# Patient Record
Sex: FEMALE | Race: WHITE | URBAN - METROPOLITAN AREA
[De-identification: names, ages, dates, MRNs, and addresses within clinical notes are randomized per-mention and may not be internally consistent; named-entity substitution may affect disease eponyms.]

---

## 2023-06-19 ENCOUNTER — OFFICE VISIT (OUTPATIENT)
Dept: ORTHOPEDICS CLINIC | Facility: CLINIC | Age: 68
End: 2023-06-19
Payer: MEDICARE

## 2023-06-19 ENCOUNTER — OFFICE VISIT (OUTPATIENT)
Dept: SURGERY | Facility: CLINIC | Age: 68
End: 2023-06-19
Payer: MEDICARE

## 2023-06-19 ENCOUNTER — HOSPITAL ENCOUNTER (OUTPATIENT)
Dept: GENERAL RADIOLOGY | Age: 68
Discharge: HOME | End: 2023-06-19
Attending: STUDENT IN AN ORGANIZED HEALTH CARE EDUCATION/TRAINING PROGRAM
Payer: MEDICARE

## 2023-06-19 VITALS
DIASTOLIC BLOOD PRESSURE: 80 MMHG | BODY MASS INDEX: 22.08 KG/M2 | HEART RATE: 70 BPM | WEIGHT: 120 LBS | SYSTOLIC BLOOD PRESSURE: 150 MMHG | HEIGHT: 62 IN

## 2023-06-19 DIAGNOSIS — M54.2 NECK PAIN: ICD-10-CM

## 2023-06-19 DIAGNOSIS — M54.12 CERVICAL RADICULOPATHY: Primary | ICD-10-CM

## 2023-06-19 DIAGNOSIS — M54.2 NECK PAIN: Primary | ICD-10-CM

## 2023-06-19 PROBLEM — N64.9 BREAST DISORDER: Status: ACTIVE | Noted: 2023-06-19

## 2023-06-19 PROBLEM — S16.1XXA CERVICAL STRAIN: Status: ACTIVE | Noted: 2021-06-10

## 2023-06-19 PROBLEM — I10 PRIMARY HYPERTENSION: Status: ACTIVE | Noted: 2023-02-24

## 2023-06-19 PROBLEM — E78.2 MIXED HYPERLIPIDEMIA: Status: ACTIVE | Noted: 2023-02-24

## 2023-06-19 PROBLEM — R00.2 PALPITATIONS: Status: ACTIVE | Noted: 2023-02-24

## 2023-06-19 PROBLEM — J30.9 ALLERGIC RHINITIS: Status: ACTIVE | Noted: 2021-06-10

## 2023-06-19 PROBLEM — J06.9 VIRAL UPPER RESPIRATORY TRACT INFECTION: Status: ACTIVE | Noted: 2021-06-10

## 2023-06-19 PROCEDURE — 3077F SYST BP >= 140 MM HG: CPT | Performed by: NEUROLOGICAL SURGERY

## 2023-06-19 PROCEDURE — 3079F DIAST BP 80-89 MM HG: CPT | Performed by: NEUROLOGICAL SURGERY

## 2023-06-19 PROCEDURE — 3008F BODY MASS INDEX DOCD: CPT | Performed by: NEUROLOGICAL SURGERY

## 2023-06-19 PROCEDURE — 1159F MED LIST DOCD IN RCRD: CPT | Performed by: NEUROLOGICAL SURGERY

## 2023-06-19 PROCEDURE — 99204 OFFICE O/P NEW MOD 45 MIN: CPT | Performed by: NEUROLOGICAL SURGERY

## 2023-06-19 PROCEDURE — 1160F RVW MEDS BY RX/DR IN RCRD: CPT | Performed by: NEUROLOGICAL SURGERY

## 2023-06-19 PROCEDURE — 72050 X-RAY EXAM NECK SPINE 4/5VWS: CPT | Performed by: STUDENT IN AN ORGANIZED HEALTH CARE EDUCATION/TRAINING PROGRAM

## 2023-06-19 RX ORDER — OMEGA-3 FATTY ACIDS/FISH OIL 300-1000MG
CAPSULE ORAL AS NEEDED
COMMUNITY

## 2023-06-19 RX ORDER — PREDNISONE 50 MG/1
50 TABLET ORAL DAILY
COMMUNITY
Start: 2023-04-22 | End: 2023-06-19

## 2023-06-19 RX ORDER — VALSARTAN 160 MG/1
1 TABLET ORAL AS DIRECTED
COMMUNITY
Start: 2021-05-03

## 2023-06-19 RX ORDER — MELOXICAM 7.5 MG/1
7.5 TABLET ORAL DAILY
Qty: 30 TABLET | Refills: 0 | Status: SHIPPED | OUTPATIENT
Start: 2023-06-19

## 2023-06-19 RX ORDER — CETIRIZINE HYDROCHLORIDE 10 MG/1
10 TABLET ORAL NIGHTLY
COMMUNITY
End: 2023-06-19

## 2023-06-19 RX ORDER — ALENDRONATE SODIUM 35 MG/1
35 TABLET ORAL
Qty: 4 TABLET | Refills: 11 | COMMUNITY
Start: 2022-09-16 | End: 2023-09-16

## 2023-06-19 RX ORDER — METOPROLOL SUCCINATE 25 MG/1
1 TABLET, EXTENDED RELEASE ORAL DAILY
Qty: 30 TABLET | Refills: 11 | COMMUNITY
Start: 2022-11-14 | End: 2023-11-14

## 2023-06-19 RX ORDER — ALPRAZOLAM 0.25 MG/1
1 TABLET ORAL AS DIRECTED
COMMUNITY
Start: 2023-03-16

## 2023-06-19 RX ORDER — METHYLPREDNISOLONE 4 MG/1
TABLET ORAL
Qty: 21 TABLET | Refills: 0 | Status: SHIPPED | OUTPATIENT
Start: 2023-06-19

## 2023-06-19 RX ORDER — ASPIRIN 325 MG
TABLET ORAL AS DIRECTED
COMMUNITY
End: 2023-06-19

## 2023-06-19 NOTE — PROGRESS NOTES
New patient:  Reason for visit: neck  back pain       Estimated time of onset:  month(s)    Numeric Rating Scale: (No Pain) 0  to  10 (Worst Pain)        Pain at Present:  3/10                                                                                                                       Distribution of Pain:    bilateral    Past Treatments for Current Pain Condition:   Physical Therapy and NSAIDS  Response to treatment: no relief    Prior diagnostic testing related to this condition:

## 2023-07-10 ENCOUNTER — TELEPHONE (OUTPATIENT)
Dept: SURGERY | Facility: CLINIC | Age: 68
End: 2023-07-10

## 2023-07-10 NOTE — TELEPHONE ENCOUNTER
Pt called along with an [employer] rep. Pt has an appt for MRI Cervical scheduled for 7/17, but there is no prior authorization. Please advise, Pt's best call back number is [phone]. Endorsed to Prior Auth.

## 2023-07-17 ENCOUNTER — HOSPITAL ENCOUNTER (OUTPATIENT)
Dept: MRI IMAGING | Facility: HOSPITAL | Age: 68
Discharge: HOME | End: 2023-07-17
Attending: NEUROLOGICAL SURGERY
Payer: MEDICARE

## 2023-07-17 DIAGNOSIS — M54.12 CERVICAL RADICULOPATHY: ICD-10-CM

## 2023-07-17 PROCEDURE — 72141 MRI NECK SPINE W/O DYE: CPT | Performed by: NEUROLOGICAL SURGERY

## 2023-09-11 ENCOUNTER — TELEPHONE (OUTPATIENT)
Dept: SURGERY | Facility: CLINIC | Age: 68
End: 2023-09-11

## 2023-09-11 NOTE — TELEPHONE ENCOUNTER
Call transferred to Nursing. Evelyn James is inquiring if the physician would prefer for the patient to undergo a Bone Spec/ CT  instead of the NM Bone scan 3 phase. Evelyn James states the Bone spec/CT is normally better for reviewing bone lesions. Spoke with Dr. Andrew Molina in office regarding inquiry listed above. Dr. Andrew Molina agreed to change imaging per [facility] recommendations. Returned call to Evelyn James advised of the providers agreement to  recommendations. Evelyn James acknowledged and was appreciative. Call was ended.

## 2023-09-12 ENCOUNTER — HOSPITAL ENCOUNTER (OUTPATIENT)
Dept: NUCLEAR MEDICINE | Facility: HOSPITAL | Age: 68
Discharge: HOME | End: 2023-09-12
Attending: NEUROLOGICAL SURGERY
Payer: MEDICARE

## 2023-09-12 ENCOUNTER — HOSPITAL ENCOUNTER (OUTPATIENT)
Dept: MRI IMAGING | Facility: HOSPITAL | Age: 68
Discharge: HOME | End: 2023-09-12
Attending: NEUROLOGICAL SURGERY
Payer: MEDICARE

## 2023-09-12 DIAGNOSIS — M89.9 BONE LESION: ICD-10-CM

## 2023-09-12 DIAGNOSIS — M54.12 CERVICAL RADICULOPATHY: ICD-10-CM

## 2023-09-12 PROCEDURE — 72156 MRI NECK SPINE W/O & W/DYE: CPT | Performed by: NEUROLOGICAL SURGERY

## 2023-09-12 PROCEDURE — 78306 BONE IMAGING WHOLE BODY: CPT | Performed by: NEUROLOGICAL SURGERY

## 2023-09-12 PROCEDURE — 78832 RP LOCLZJ TUM SPECT W/CT 2: CPT | Performed by: NEUROLOGICAL SURGERY

## 2023-09-12 PROCEDURE — A9575 INJ GADOTERATE MEGLUMI 0.1ML: HCPCS | Performed by: NEUROLOGICAL SURGERY

## 2023-09-12 RX ORDER — GADOTERATE MEGLUMINE 376.9 MG/ML
15 INJECTION INTRAVENOUS
Status: COMPLETED | OUTPATIENT
Start: 2023-09-12 | End: 2023-09-12

## 2023-09-12 RX ADMIN — GADOTERATE MEGLUMINE 11 ML: 376.9 INJECTION INTRAVENOUS at 16:03:00

## 2023-09-13 ENCOUNTER — TELEPHONE (OUTPATIENT)
Dept: HEMATOLOGY/ONCOLOGY | Facility: HOSPITAL | Age: 68
End: 2023-09-13

## 2023-09-13 ENCOUNTER — OFFICE VISIT (OUTPATIENT)
Dept: HEMATOLOGY/ONCOLOGY | Facility: HOSPITAL | Age: 68
End: 2023-09-13
Attending: INTERNAL MEDICINE
Payer: MEDICARE

## 2023-09-13 VITALS
HEART RATE: 64 BPM | RESPIRATION RATE: 16 BRPM | HEIGHT: 62.01 IN | BODY MASS INDEX: 21.26 KG/M2 | SYSTOLIC BLOOD PRESSURE: 175 MMHG | DIASTOLIC BLOOD PRESSURE: 84 MMHG | TEMPERATURE: 99 F | OXYGEN SATURATION: 97 % | WEIGHT: 117 LBS

## 2023-09-13 DIAGNOSIS — M54.6 ACUTE MIDLINE THORACIC BACK PAIN: ICD-10-CM

## 2023-09-13 DIAGNOSIS — M89.9 LYTIC LESION OF BONE ON X-RAY: Primary | ICD-10-CM

## 2023-09-13 DIAGNOSIS — R94.8 ABNORMAL BONE SCAN OF THORACIC SPINE: ICD-10-CM

## 2023-09-13 LAB
ALBUMIN SERPL-MCNC: 4 G/DL (ref 3.4–5)
ALBUMIN/GLOB SERPL: 1 {RATIO} (ref 1–2)
ALP LIVER SERPL-CCNC: 270 U/L
ALT SERPL-CCNC: 65 U/L
ANION GAP SERPL CALC-SCNC: 6 MMOL/L (ref 0–18)
APTT PPP: 28.7 SECONDS (ref 23.3–35.6)
AST SERPL-CCNC: 37 U/L (ref 15–37)
BASOPHILS # BLD AUTO: 0.03 X10(3) UL (ref 0–0.2)
BASOPHILS NFR BLD AUTO: 0.5 %
BILIRUB SERPL-MCNC: 0.8 MG/DL (ref 0.1–2)
BRCA1 C.185DELAG BLD/T QL: 94.3 U/ML (ref ?–38)
BUN BLD-MCNC: 17 MG/DL (ref 7–18)
CALCIUM BLD-MCNC: 9.7 MG/DL (ref 8.5–10.1)
CANCER AG15-3 SERPL-ACNC: 75.1 U/ML (ref ?–35)
CHLORIDE SERPL-SCNC: 106 MMOL/L (ref 98–112)
CO2 SERPL-SCNC: 27 MMOL/L (ref 21–32)
CREAT BLD-MCNC: 0.8 MG/DL
EGFRCR SERPLBLD CKD-EPI 2021: 80 ML/MIN/1.73M2 (ref 60–?)
EOSINOPHIL # BLD AUTO: 0.11 X10(3) UL (ref 0–0.7)
EOSINOPHIL NFR BLD AUTO: 1.9 %
ERYTHROCYTE [DISTWIDTH] IN BLOOD BY AUTOMATED COUNT: 13.2 %
FASTING STATUS PATIENT QL REPORTED: NO
GLOBULIN PLAS-MCNC: 3.9 G/DL (ref 2.8–4.4)
GLUCOSE BLD-MCNC: 94 MG/DL (ref 70–99)
HCT VFR BLD AUTO: 43.9 %
HGB BLD-MCNC: 14.7 G/DL
IMM GRANULOCYTES # BLD AUTO: 0.02 X10(3) UL (ref 0–1)
IMM GRANULOCYTES NFR BLD: 0.3 %
INR BLD: 0.98 (ref 0.85–1.16)
LDH SERPL L TO P-CCNC: 200 U/L
LYMPHOCYTES # BLD AUTO: 1.3 X10(3) UL (ref 1–4)
LYMPHOCYTES NFR BLD AUTO: 22.5 %
MCH RBC QN AUTO: 30.3 PG (ref 26–34)
MCHC RBC AUTO-ENTMCNC: 33.5 G/DL (ref 31–37)
MCV RBC AUTO: 90.5 FL
MONOCYTES # BLD AUTO: 0.6 X10(3) UL (ref 0.1–1)
MONOCYTES NFR BLD AUTO: 10.4 %
NEUTROPHILS # BLD AUTO: 3.73 X10 (3) UL (ref 1.5–7.7)
NEUTROPHILS # BLD AUTO: 3.73 X10(3) UL (ref 1.5–7.7)
NEUTROPHILS NFR BLD AUTO: 64.4 %
OSMOLALITY SERPL CALC.SUM OF ELEC: 289 MOSM/KG (ref 275–295)
PLATELET # BLD AUTO: 229 10(3)UL (ref 150–450)
POTASSIUM SERPL-SCNC: 4 MMOL/L (ref 3.5–5.1)
PROT SERPL-MCNC: 7.9 G/DL (ref 6.4–8.2)
PROTHROMBIN TIME: 13 SECONDS (ref 11.6–14.8)
RBC # BLD AUTO: 4.85 X10(6)UL
SODIUM SERPL-SCNC: 139 MMOL/L (ref 136–145)
WBC # BLD AUTO: 5.8 X10(3) UL (ref 4–11)

## 2023-09-13 PROCEDURE — G2212 PROLONG OUTPT/OFFICE VIS: HCPCS | Performed by: INTERNAL MEDICINE

## 2023-09-13 PROCEDURE — 99205 OFFICE O/P NEW HI 60 MIN: CPT | Performed by: INTERNAL MEDICINE

## 2023-09-13 RX ORDER — LETROZOLE 2.5 MG/1
2.5 TABLET, FILM COATED ORAL DAILY
Qty: 30 TABLET | Refills: 3 | Status: SHIPPED | OUTPATIENT
Start: 2023-09-13

## 2023-09-13 RX ORDER — DEXAMETHASONE 4 MG/1
8 TABLET ORAL
Qty: 60 TABLET | Refills: 0 | Status: SHIPPED | OUTPATIENT
Start: 2023-09-13

## 2023-09-14 ENCOUNTER — PATIENT MESSAGE (OUTPATIENT)
Dept: HEMATOLOGY/ONCOLOGY | Facility: HOSPITAL | Age: 68
End: 2023-09-14

## 2023-09-15 LAB
ALBUMIN SERPL ELPH-MCNC: 4.45 G/DL (ref 3.75–5.21)
ALBUMIN/GLOB SERPL: 1.51 {RATIO} (ref 1–2)
ALPHA1 GLOB SERPL ELPH-MCNC: 0.42 G/DL (ref 0.19–0.46)
ALPHA2 GLOB SERPL ELPH-MCNC: 0.88 G/DL (ref 0.48–1.05)
B-GLOBULIN SERPL ELPH-MCNC: 0.75 G/DL (ref 0.68–1.23)
GAMMA GLOB SERPL ELPH-MCNC: 0.89 G/DL (ref 0.62–1.7)
KAPPA LC FREE SER-MCNC: 2.42 MG/DL (ref 0.33–1.94)
KAPPA LC FREE/LAMBDA FREE SER NEPH: 1.79 {RATIO} (ref 0.26–1.65)
LAMBDA LC FREE SERPL-MCNC: 1.35 MG/DL (ref 0.57–2.63)
PROT SERPL-MCNC: 7.4 G/DL (ref 6.4–8.2)

## 2023-09-20 ENCOUNTER — HOSPITAL ENCOUNTER (OUTPATIENT)
Dept: NUCLEAR MEDICINE | Facility: HOSPITAL | Age: 68
Discharge: HOME | End: 2023-09-20
Attending: INTERNAL MEDICINE
Payer: MEDICARE

## 2023-09-20 ENCOUNTER — HOSPITAL ENCOUNTER (OUTPATIENT)
Dept: MRI IMAGING | Facility: HOSPITAL | Age: 68
Discharge: HOME | End: 2023-09-20
Attending: INTERNAL MEDICINE
Payer: MEDICARE

## 2023-09-20 ENCOUNTER — TELEPHONE (OUTPATIENT)
Dept: HEMATOLOGY/ONCOLOGY | Facility: HOSPITAL | Age: 68
End: 2023-09-20

## 2023-09-20 DIAGNOSIS — R94.8 ABNORMAL BONE SCAN OF THORACIC SPINE: ICD-10-CM

## 2023-09-20 DIAGNOSIS — M54.6 ACUTE MIDLINE THORACIC BACK PAIN: ICD-10-CM

## 2023-09-20 DIAGNOSIS — M89.9 LYTIC LESION OF BONE ON X-RAY: ICD-10-CM

## 2023-09-20 LAB — GLUCOSE BLD-MCNC: 119 MG/DL (ref 70–99)

## 2023-09-20 PROCEDURE — 78815 PET IMAGE W/CT SKULL-THIGH: CPT | Performed by: INTERNAL MEDICINE

## 2023-09-20 PROCEDURE — A9575 INJ GADOTERATE MEGLUMI 0.1ML: HCPCS | Performed by: INTERNAL MEDICINE

## 2023-09-20 PROCEDURE — 82962 GLUCOSE BLOOD TEST: CPT

## 2023-09-20 PROCEDURE — 72157 MRI CHEST SPINE W/O & W/DYE: CPT | Performed by: INTERNAL MEDICINE

## 2023-09-20 RX ORDER — DIPHENHYDRAMINE HYDROCHLORIDE 50 MG/ML
10 INJECTION, SOLUTION INTRAMUSCULAR; INTRAVENOUS
Status: COMPLETED | OUTPATIENT
Start: 2023-09-20 | End: 2023-09-20

## 2023-09-20 RX ADMIN — DIPHENHYDRAMINE HYDROCHLORIDE 10 ML: 50 INJECTION, SOLUTION INTRAMUSCULAR; INTRAVENOUS at 10:19:00

## 2023-09-25 RX ORDER — MULTIVITAMIN
1 TABLET ORAL EVERY OTHER DAY
COMMUNITY

## 2023-09-27 ENCOUNTER — NURSE ONLY (OUTPATIENT)
Dept: LAB | Facility: HOSPITAL | Age: 68
End: 2023-09-27
Attending: INTERNAL MEDICINE
Payer: MEDICARE

## 2023-09-27 ENCOUNTER — HOSPITAL ENCOUNTER (OUTPATIENT)
Dept: CT IMAGING | Facility: HOSPITAL | Age: 68
Discharge: HOME | End: 2023-09-27
Attending: INTERNAL MEDICINE
Payer: MEDICARE

## 2023-09-27 VITALS
HEIGHT: 62 IN | HEART RATE: 66 BPM | TEMPERATURE: 98 F | BODY MASS INDEX: 21.53 KG/M2 | WEIGHT: 117 LBS | OXYGEN SATURATION: 98 % | DIASTOLIC BLOOD PRESSURE: 66 MMHG | RESPIRATION RATE: 18 BRPM | SYSTOLIC BLOOD PRESSURE: 114 MMHG

## 2023-09-27 DIAGNOSIS — R94.8 ABNORMAL BONE SCAN OF THORACIC SPINE: ICD-10-CM

## 2023-09-27 DIAGNOSIS — M89.9 LYTIC LESION OF BONE ON X-RAY: ICD-10-CM

## 2023-09-27 DIAGNOSIS — M89.9 LYTIC LESION OF BONE ON X-RAY: Primary | ICD-10-CM

## 2023-09-27 LAB
ERYTHROCYTE [DISTWIDTH] IN BLOOD BY AUTOMATED COUNT: 14 %
HCT VFR BLD AUTO: 42.1 %
HGB BLD-MCNC: 14.2 G/DL
INR BLD: 0.86 (ref 0.85–1.16)
MCH RBC QN AUTO: 30.4 PG (ref 26–34)
MCHC RBC AUTO-ENTMCNC: 33.7 G/DL (ref 31–37)
MCV RBC AUTO: 90.1 FL
PLATELET # BLD AUTO: 253 10(3)UL (ref 150–450)
PROTHROMBIN TIME: 11.7 SECONDS (ref 11.6–14.8)
RBC # BLD AUTO: 4.67 X10(6)UL
WBC # BLD AUTO: 13.4 X10(3) UL (ref 4–11)

## 2023-09-27 PROCEDURE — 20225 BONE BIOPSY TROCAR/NDL DEEP: CPT | Performed by: INTERNAL MEDICINE

## 2023-09-27 PROCEDURE — 85027 COMPLETE CBC AUTOMATED: CPT

## 2023-09-27 PROCEDURE — 77012 CT SCAN FOR NEEDLE BIOPSY: CPT | Performed by: INTERNAL MEDICINE

## 2023-09-27 PROCEDURE — 85610 PROTHROMBIN TIME: CPT

## 2023-09-27 PROCEDURE — 88342 IMHCHEM/IMCYTCHM 1ST ANTB: CPT | Performed by: INTERNAL MEDICINE

## 2023-09-27 PROCEDURE — 99152 MOD SED SAME PHYS/QHP 5/>YRS: CPT | Performed by: INTERNAL MEDICINE

## 2023-09-27 PROCEDURE — 88341 IMHCHEM/IMCYTCHM EA ADD ANTB: CPT | Performed by: INTERNAL MEDICINE

## 2023-09-27 PROCEDURE — 36415 COLL VENOUS BLD VENIPUNCTURE: CPT

## 2023-09-27 PROCEDURE — 88307 TISSUE EXAM BY PATHOLOGIST: CPT | Performed by: INTERNAL MEDICINE

## 2023-09-27 PROCEDURE — 88360 TUMOR IMMUNOHISTOCHEM/MANUAL: CPT | Performed by: INTERNAL MEDICINE

## 2023-09-27 PROCEDURE — 88377 M/PHMTRC ALYS ISHQUANT/SEMIQ: CPT | Performed by: INTERNAL MEDICINE

## 2023-09-27 RX ORDER — MIDAZOLAM HYDROCHLORIDE 1 MG/ML
INJECTION INTRAMUSCULAR; INTRAVENOUS
Status: COMPLETED
Start: 2023-09-27 | End: 2023-09-27

## 2023-09-27 RX ORDER — SODIUM CHLORIDE 9 MG/ML
INJECTION, SOLUTION INTRAVENOUS CONTINUOUS
Status: DISCONTINUED | OUTPATIENT
Start: 2023-09-27 | End: 2023-09-29

## 2023-09-27 RX ORDER — NALOXONE HYDROCHLORIDE 0.4 MG/ML
80 INJECTION, SOLUTION INTRAMUSCULAR; INTRAVENOUS; SUBCUTANEOUS AS NEEDED
Status: DISCONTINUED | OUTPATIENT
Start: 2023-09-27 | End: 2023-09-29

## 2023-09-27 RX ORDER — MIDAZOLAM HYDROCHLORIDE 1 MG/ML
1 INJECTION INTRAMUSCULAR; INTRAVENOUS EVERY 5 MIN PRN
Status: DISCONTINUED | OUTPATIENT
Start: 2023-09-27 | End: 2023-09-29

## 2023-09-27 RX ORDER — FLUMAZENIL 0.1 MG/ML
0.2 INJECTION INTRAVENOUS AS NEEDED
Status: DISCONTINUED | OUTPATIENT
Start: 2023-09-27 | End: 2023-09-29

## 2023-09-27 RX ADMIN — SODIUM CHLORIDE: 9 INJECTION, SOLUTION INTRAVENOUS at 13:35:00

## 2023-09-27 RX ADMIN — MIDAZOLAM HYDROCHLORIDE 1 MG: 1 INJECTION INTRAMUSCULAR; INTRAVENOUS at 13:40:00

## 2023-09-27 RX ADMIN — MIDAZOLAM HYDROCHLORIDE 0.5 MG: 1 INJECTION INTRAMUSCULAR; INTRAVENOUS at 13:50:00

## 2023-09-27 NOTE — IMAGING NOTE
NPO status verified  Pertinent labs and radiology imaging reviewed  Consent signed and on file    Patient tolerated procedural sedation without any immediate complications noted. Biopsy site to right upper/mid back with tegaderm dressing. C/D/I. Patient denies pain. Report given to Schuyler Hernandez. Patient transported to [phone] accompanied by RN and CT Tech.

## 2023-09-27 NOTE — PROCEDURES
[facility]  Procedure Note    Gris Alexis Patient Status:  Outpatient    3/26/1955 MRN [id number]   [facility] CT Attending Wero Vernon MD    Day # 0 PCP PHYSICIAN NONSTAFF     Procedure: Thoracic lesion biopsy    Pre-Procedure Diagnosis:  Thoracic vertebral body lesions    Post-Procedure Diagnosis: Same    Anesthesia:  Sedation    Findings:  No complication    Specimens: 1 core    Blood Loss:  < 5 cc    Tourniquet Time: None  Complications:  None  Drains:  None    Secondary Diagnosis:  n/A    Odalys Oconnor MD  2023

## 2023-09-29 ENCOUNTER — PATIENT MESSAGE (OUTPATIENT)
Dept: HEMATOLOGY/ONCOLOGY | Facility: HOSPITAL | Age: 68
End: 2023-09-29

## 2023-09-29 PROBLEM — C50.919 BREAST CARCINOMA METASTATIC TO MULTIPLE SITES (HCC): Status: ACTIVE | Noted: 2023-09-29

## 2023-09-29 PROBLEM — C79.51 PAIN FROM BONE METASTASES (HCC): Status: ACTIVE | Noted: 2023-09-29

## 2023-09-29 PROBLEM — G89.3 PAIN FROM BONE METASTASES (HCC): Status: ACTIVE | Noted: 2023-09-29

## 2023-09-29 NOTE — TELEPHONE ENCOUNTER
Spoke to patient. Biopsy of bone shows metastatic breast carcinoma, ER/VT+, low Ki-67, HER2 2+ by IHC, FISH pending. Pain is much better. Prescription for abemaciclib sent to [facility].  Will make appointment next week to start Xgeva. Check NGS same visit. Recommend palliative RT to spine, will refer to [facility] as closer to home. Recommend tapering dexamethasone to 2 mg daily, may stop once what she has at home is finished.

## 2023-10-04 ENCOUNTER — TELEPHONE (OUTPATIENT)
Dept: HEMATOLOGY/ONCOLOGY | Facility: HOSPITAL | Age: 68
End: 2023-10-04

## 2023-10-06 ENCOUNTER — OFFICE VISIT (OUTPATIENT)
Dept: HEMATOLOGY/ONCOLOGY | Facility: HOSPITAL | Age: 68
End: 2023-10-06
Attending: INTERNAL MEDICINE
Payer: MEDICARE

## 2023-10-06 ENCOUNTER — TELEPHONE (OUTPATIENT)
Dept: HEMATOLOGY/ONCOLOGY | Facility: HOSPITAL | Age: 68
End: 2023-10-06

## 2023-10-06 VITALS
OXYGEN SATURATION: 99 % | HEART RATE: 71 BPM | TEMPERATURE: 98 F | RESPIRATION RATE: 18 BRPM | DIASTOLIC BLOOD PRESSURE: 67 MMHG | BODY MASS INDEX: 21.92 KG/M2 | WEIGHT: 120.63 LBS | HEIGHT: 62.01 IN | SYSTOLIC BLOOD PRESSURE: 133 MMHG

## 2023-10-06 DIAGNOSIS — R94.8 ABNORMAL BONE SCAN OF THORACIC SPINE: ICD-10-CM

## 2023-10-06 DIAGNOSIS — G89.3 PAIN FROM BONE METASTASES (HCC): ICD-10-CM

## 2023-10-06 DIAGNOSIS — R79.89 ABNORMAL LFTS: Primary | ICD-10-CM

## 2023-10-06 DIAGNOSIS — C50.919 CARCINOMA OF BREAST METASTATIC TO MULTIPLE SITES, UNSPECIFIED LATERALITY (HCC): Primary | ICD-10-CM

## 2023-10-06 DIAGNOSIS — C79.51 PAIN FROM BONE METASTASES (HCC): ICD-10-CM

## 2023-10-06 DIAGNOSIS — C50.919 CARCINOMA OF BREAST METASTATIC TO MULTIPLE SITES, UNSPECIFIED LATERALITY (HCC): ICD-10-CM

## 2023-10-06 LAB
ALBUMIN SERPL-MCNC: 3.3 G/DL (ref 3.4–5)
ALBUMIN/GLOB SERPL: 0.8 {RATIO} (ref 1–2)
ALP LIVER SERPL-CCNC: 462 U/L
ALT SERPL-CCNC: 567 U/L
ANION GAP SERPL CALC-SCNC: 4 MMOL/L (ref 0–18)
AST SERPL-CCNC: 241 U/L (ref 15–37)
BASOPHILS # BLD AUTO: 0.05 X10(3) UL (ref 0–0.2)
BASOPHILS NFR BLD AUTO: 0.5 %
BILIRUB SERPL-MCNC: 0.7 MG/DL (ref 0.1–2)
BRCA1 C.185DELAG BLD/T QL: 82.7 U/ML (ref ?–38)
BUN BLD-MCNC: 17 MG/DL (ref 7–18)
CALCIUM BLD-MCNC: 9.3 MG/DL (ref 8.5–10.1)
CHLORIDE SERPL-SCNC: 107 MMOL/L (ref 98–112)
CO2 SERPL-SCNC: 26 MMOL/L (ref 21–32)
CREAT BLD-MCNC: 0.92 MG/DL
EGFRCR SERPLBLD CKD-EPI 2021: 67 ML/MIN/1.73M2 (ref 60–?)
EOSINOPHIL # BLD AUTO: 0.2 X10(3) UL (ref 0–0.7)
EOSINOPHIL NFR BLD AUTO: 1.8 %
ERYTHROCYTE [DISTWIDTH] IN BLOOD BY AUTOMATED COUNT: 14.2 %
GLOBULIN PLAS-MCNC: 3.9 G/DL (ref 2.8–4.4)
GLUCOSE BLD-MCNC: 95 MG/DL (ref 70–99)
HCT VFR BLD AUTO: 42.2 %
HGB BLD-MCNC: 13.8 G/DL
IMM GRANULOCYTES # BLD AUTO: 0.08 X10(3) UL (ref 0–1)
IMM GRANULOCYTES NFR BLD: 0.7 %
LYMPHOCYTES # BLD AUTO: 0.79 X10(3) UL (ref 1–4)
LYMPHOCYTES NFR BLD AUTO: 7.3 %
MCH RBC QN AUTO: 30.7 PG (ref 26–34)
MCHC RBC AUTO-ENTMCNC: 32.7 G/DL (ref 31–37)
MCV RBC AUTO: 93.8 FL
MONOCYTES # BLD AUTO: 0.63 X10(3) UL (ref 0.1–1)
MONOCYTES NFR BLD AUTO: 5.8 %
NEUTROPHILS # BLD AUTO: 9.13 X10 (3) UL (ref 1.5–7.7)
NEUTROPHILS # BLD AUTO: 9.13 X10(3) UL (ref 1.5–7.7)
NEUTROPHILS NFR BLD AUTO: 83.9 %
OSMOLALITY SERPL CALC.SUM OF ELEC: 285 MOSM/KG (ref 275–295)
PLATELET # BLD AUTO: 178 10(3)UL (ref 150–450)
POTASSIUM SERPL-SCNC: 3.9 MMOL/L (ref 3.5–5.1)
PROT SERPL-MCNC: 7.2 G/DL (ref 6.4–8.2)
RBC # BLD AUTO: 4.5 X10(6)UL
SODIUM SERPL-SCNC: 137 MMOL/L (ref 136–145)
VIT D+METAB SERPL-MCNC: 19.7 NG/ML (ref 30–100)
WBC # BLD AUTO: 10.9 X10(3) UL (ref 4–11)

## 2023-10-06 PROCEDURE — 99215 OFFICE O/P EST HI 40 MIN: CPT | Performed by: INTERNAL MEDICINE

## 2023-10-06 PROCEDURE — 96374 THER/PROPH/DIAG INJ IV PUSH: CPT

## 2023-10-06 RX ORDER — ERGOCALCIFEROL 1.25 MG/1
50000 CAPSULE ORAL WEEKLY
Qty: 12 CAPSULE | Refills: 0 | Status: SHIPPED | OUTPATIENT
Start: 2023-10-06 | End: 2023-12-23

## 2023-10-06 NOTE — PROGRESS NOTES
[facility] Pharmacy Note:  Renal Dose Adjustment for Zometa (Zoledronic acid)    Tiffany Vega is a 70year old female has been prescribed Zometa (Zoledronic acid)  4 mg IVPB once. LABS:   Lab Results   Component Value Date/Time    CREATSERUM 0.92 10/06/2023 09:31 AM    BUN 17 10/06/2023 09:31 AM         CrCl: Estimated Creatinine Clearance: 50 mL/min (based on SCr of 0.92 mg/dL and actual body wt of 55 kg). Calculated creatinine clearance is 50 to 60 mL/minute  therefore, the dose of Zometa (Zoledronic acid) has been changed to 3.5 mg IVPB once per P&T approved protocol.      Thank you,  Gris Oneill, [facility]  10/6/2023 10:44 AM

## 2023-10-06 NOTE — PROGRESS NOTES
Education Record    Learner:  Patient    Disease / Diagnosis:  Met breast ca    Barriers / Limitations:  None   Comments:    Method:  Discussion   Comments:    General Topics:  Plan of care reviewed   Comments:    Outcome:  Shows understanding   Comments: has been taking Letrozole. Scheduled for zometa today. Dex 2mg daily. Has not been contacted by RT yet . Pt reports some sinus pain. Back pain, ok on ibuprofen 2 doses per day. Asking about continuing alendronate. Has not resumed. Reviewed in detail plan of care, repeat labs in 2 weeks. Arrange chemo ed and start Verzenio after that. Maddi Ghotra will be provided by [address] [place] [address] for $50 copay. Will reach out to radiation for consultation. Reviewed that will, need to hold verzenio while on RT. Encouraged to call with questions.

## 2023-10-06 NOTE — TELEPHONE ENCOUNTER
Spoke with [facility] regarding consultation with pt for radiation oncology. Referral and last office note faxed and Disk with imaging mailed to facility.      Phone [phone]  Fax [phone]

## 2023-10-06 NOTE — PATIENT INSTRUCTIONS
Call [address] [facility] Oncology department at [phone] to schedule an appointment.

## 2023-10-06 NOTE — PROGRESS NOTES
Education Record    Learner:  Patient    Disease / Gabi Fend from bone metastases     Barriers / Limitations:  None   Comments:    Method:  Brief focused   Comments:    General Topics:  Diet, Infection, Medication, Pain, Precautions, Procedure, Side effects and symptom management, Plan of care reviewed, and Fall risk and prevention   Comments:    Outcome:  Shows understanding   Comments:    Patient had her first Zometa infusion.

## 2023-10-11 ENCOUNTER — VIRTUAL PHONE E/M (OUTPATIENT)
Dept: HEMATOLOGY/ONCOLOGY | Facility: HOSPITAL | Age: 68
End: 2023-10-11
Attending: INTERNAL MEDICINE
Payer: MEDICARE

## 2023-10-11 DIAGNOSIS — Z71.89 OTHER SPECIFIED COUNSELING: ICD-10-CM

## 2023-10-11 DIAGNOSIS — C50.912 CARCINOMA OF LEFT BREAST METASTATIC TO MULTIPLE SITES (HCC): Primary | ICD-10-CM

## 2023-10-11 DIAGNOSIS — R79.89 LOW SERUM VITAMIN D: Primary | ICD-10-CM

## 2023-10-11 PROCEDURE — 99442 PHONE E/M BY PHYS 11-20 MIN: CPT | Performed by: CLINICAL NURSE SPECIALIST

## 2023-10-11 RX ORDER — ERGOCALCIFEROL 1.25 MG/1
50000 CAPSULE ORAL WEEKLY
Qty: 12 CAPSULE | Refills: 0 | Status: SHIPPED | OUTPATIENT
Start: 2023-10-11 | End: 2023-12-28

## 2023-10-11 NOTE — PROGRESS NOTES
Virtual Telephone Check-In    Ulysees Alpers verbally consents to a Virtual/Telephone Check-In visit on 10/11/23. Patient understands and accepts financial responsibility for any deductible, co-insurance and/or co-pays associated with this service.     Duration of the service: 45 minutes      Summary of topics discussed:       ORAL CHEMOTHERAPY EDUCATION RECORD  Learner:  Patient  Barriers / Limitations:  None    Diagnosis:   Metastatic breast cancer  Chemo Drug/Dose/Frequency:   Verzenio 150mg BID        Administration Guidelines:  with or without food     Drug / Drug Interactions:  avoid grapefruit products    Start Date of Treatment:  10/13/23  Myelosuppression  Decrease in wbc  Decrease in hgb  Decrease in platelets  Risk of infection  Fatigue  Risk of bleeding  Notify MD/RN of any chills or fever 100.5and above:     Gastrointestinal Toxicities:    Stomatitis, sensitivity or oropharyngeal membranes, dry mouth, thrush  Appropriate oral hygiene  Changes in taste perception   Loss of appetite, possible weightloss  Nausea and vomiting   Use of anti-nausea medications  Diarrhea   Use of Imodium  Constipation  Use of various laxatives      Treatment Effects on Hair:  Alopecia or thinning      Neurotoxicity:  Weakness, fatigue      Pulmonary  ILD/pneumonitis    Hepatotoxicity  Rise in LFTs    Nephrotoxicity  Rise in serum creatinine  Dehydration        When to Call the Office:  Fevers  - 100.5 or greater  Nausea /vomiting not controlled with anti-nausea medications  Diarrhea -not controlled with Imodium AD or more than 6 episodes in 24 hours  Constipation - No BM x 3 days, no responsive to stool softeners or laxatives  Shortness of Breath  Excessive fatigue or weakness  New onset rash  Sudden onset of any unexpected symptom - such as change in vision, sense of balance, dizziness or lightheadedness, swelling one arm or leg       Safe Handling / Disposal:  This was reviewed with the patient and handout provided. Missed Dose Management:   Skip the missed dose and proceed with normal time. Avoid 2 doses at the same time or extra doses. What Phone Number to Call:   Medical Oncologist /  After Hours / Weekend Number For On-Call Physician:  [phone]      Teaching Materials Provided:      Chemotherapy information sheets  ACS Understanding Chemotherapy Booklet  ACS Nutrition for the Person with Cancer during Treatment / Dietician information sheet  When to contact the Treatment Team Information Sheet  Side Effect Management [facility] Information sheet    Patient and caregiver were given ample opportunity to ask questions. All questions and concerns addressed. We discussed self care techniques, symptom management, fluid, diet, and activities. Chemotherapy Consent Form signed by the patient. I spent 45 minutes with the patient, 100 % of that time was spent counseling patient regarding the above documented side effects and management, when to call provider and contact information. Encounter Times  PreChartin minutes    Reviewing/Obtaining:   minutes      Medical Exam:   minutes    Plan:   minutes      Notes: 5 minutes    Counseling/Education: 45 minutes      Referring/Communicating:   minutes    Ind Interpretation:   minutes      Care Coordination:   minutes       My total time spent caring for the patient on the day of the encounter: 52 minutes.          Julio Cesar KRUSE  Nurse Practitioner   [facility] [address] [place] [address]

## 2023-10-16 ENCOUNTER — TELEPHONE (OUTPATIENT)
Dept: HEMATOLOGY/ONCOLOGY | Facility: HOSPITAL | Age: 68
End: 2023-10-16

## 2023-10-16 NOTE — TELEPHONE ENCOUNTER
Toxicities: Abemaciclib/Letrozole    [facility]ora alert received for weekly EPRO and Oral Medication Adherence    EPRO responses all A-C. No D or E   Rarely having pain  A little fatigue  Somewhat a little sad/unhappy. A little anxiety. Watery stools rare. Does not want a call back. Patient has not missed any does of oral medication.

## 2023-10-20 ENCOUNTER — NURSE ONLY (OUTPATIENT)
Dept: HEMATOLOGY/ONCOLOGY | Facility: HOSPITAL | Age: 68
End: 2023-10-20
Attending: INTERNAL MEDICINE
Payer: MEDICARE

## 2023-10-20 ENCOUNTER — TELEPHONE (OUTPATIENT)
Dept: HEMATOLOGY/ONCOLOGY | Facility: HOSPITAL | Age: 68
End: 2023-10-20

## 2023-10-20 DIAGNOSIS — R79.89 ABNORMAL LFTS: ICD-10-CM

## 2023-10-20 LAB
ALBUMIN SERPL-MCNC: 3.6 G/DL (ref 3.4–5)
ALP LIVER SERPL-CCNC: 383 U/L
ALT SERPL-CCNC: 75 U/L
AST SERPL-CCNC: 24 U/L (ref 15–37)
BILIRUB DIRECT SERPL-MCNC: 0.2 MG/DL (ref 0–0.2)
BILIRUB SERPL-MCNC: 1 MG/DL (ref 0.1–2)
PROT SERPL-MCNC: 7.4 G/DL (ref 6.4–8.2)

## 2023-10-20 PROCEDURE — 36415 COLL VENOUS BLD VENIPUNCTURE: CPT

## 2023-10-20 PROCEDURE — 80076 HEPATIC FUNCTION PANEL: CPT

## 2023-10-23 ENCOUNTER — TELEPHONE (OUTPATIENT)
Dept: HEMATOLOGY/ONCOLOGY | Facility: HOSPITAL | Age: 68
End: 2023-10-23

## 2023-10-23 NOTE — TELEPHONE ENCOUNTER
Toxicities: Abemaciclib/Letrozole/Zoledronic Acid    Memora Alert    Pain (C) Occasional Mild  Fatigue (C) Somewhat   Sad/Unhappy Feelings (B) A little bit  Anxiety (B) A little Bit  Hot Flashes (B) Rarely  Overall Health (B) Very Good    No D or F ratings of symptoms. Patient requests not to be called.

## 2023-11-01 ENCOUNTER — TELEPHONE (OUTPATIENT)
Dept: HEMATOLOGY/ONCOLOGY | Facility: HOSPITAL | Age: 68
End: 2023-11-01

## 2023-11-01 NOTE — TELEPHONE ENCOUNTER
Toxicities: Abemaciclib    Memora Alert - Fatigue    Fatigue: Grade 2 (Patient reports she works out twice a week with a . 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 She also had many trick or treaters come to her home yesterday. She denies dyspnea at rest or with exertion. No active bleeding. She feels she just \"did too much\" yesterday. She said she will be seeing Dr Srinivas Shepherd on Friday. She will discuss it with her at her appointment.

## 2023-11-03 ENCOUNTER — OFFICE VISIT (OUTPATIENT)
Dept: HEMATOLOGY/ONCOLOGY | Facility: HOSPITAL | Age: 68
End: 2023-11-03
Attending: INTERNAL MEDICINE
Payer: MEDICARE

## 2023-11-03 VITALS
DIASTOLIC BLOOD PRESSURE: 80 MMHG | SYSTOLIC BLOOD PRESSURE: 153 MMHG | OXYGEN SATURATION: 98 % | TEMPERATURE: 98 F | HEART RATE: 70 BPM | BODY MASS INDEX: 22 KG/M2 | WEIGHT: 120.38 LBS | RESPIRATION RATE: 18 BRPM

## 2023-11-03 DIAGNOSIS — M89.9 LYTIC LESION OF BONE ON X-RAY: ICD-10-CM

## 2023-11-03 DIAGNOSIS — C50.912 CARCINOMA OF LEFT BREAST METASTATIC TO MULTIPLE SITES (HCC): Primary | ICD-10-CM

## 2023-11-03 DIAGNOSIS — D70.2 NEUTROPENIA, DRUG-INDUCED (HCC): ICD-10-CM

## 2023-11-03 DIAGNOSIS — C79.51 PAIN FROM BONE METASTASES (HCC): ICD-10-CM

## 2023-11-03 DIAGNOSIS — T50.905D ADVERSE EFFECT OF DRUG, SUBSEQUENT ENCOUNTER: ICD-10-CM

## 2023-11-03 DIAGNOSIS — G44.89 OTHER HEADACHE SYNDROME: ICD-10-CM

## 2023-11-03 DIAGNOSIS — C50.919 CARCINOMA OF BREAST METASTATIC TO MULTIPLE SITES, UNSPECIFIED LATERALITY (HCC): Primary | ICD-10-CM

## 2023-11-03 DIAGNOSIS — G89.3 PAIN FROM BONE METASTASES (HCC): ICD-10-CM

## 2023-11-03 LAB
ALBUMIN SERPL-MCNC: 3.4 G/DL (ref 3.4–5)
ALBUMIN/GLOB SERPL: 0.9 {RATIO} (ref 1–2)
ALP LIVER SERPL-CCNC: 425 U/L
ALT SERPL-CCNC: 98 U/L
ANION GAP SERPL CALC-SCNC: 4 MMOL/L (ref 0–18)
AST SERPL-CCNC: 56 U/L (ref 15–37)
BASOPHILS # BLD AUTO: 0.05 X10(3) UL (ref 0–0.2)
BASOPHILS NFR BLD AUTO: 2.7 %
BILIRUB SERPL-MCNC: 1.2 MG/DL (ref 0.1–2)
BRCA1 C.185DELAG BLD/T QL: 98.3 U/ML (ref ?–38)
BUN BLD-MCNC: 12 MG/DL (ref 9–23)
CALCIUM BLD-MCNC: 9 MG/DL (ref 8.5–10.1)
CHLORIDE SERPL-SCNC: 107 MMOL/L (ref 98–112)
CO2 SERPL-SCNC: 27 MMOL/L (ref 21–32)
CREAT BLD-MCNC: 0.96 MG/DL
EGFRCR SERPLBLD CKD-EPI 2021: 64 ML/MIN/1.73M2 (ref 60–?)
EOSINOPHIL # BLD AUTO: 0.08 X10(3) UL (ref 0–0.7)
EOSINOPHIL NFR BLD AUTO: 4.3 %
ERYTHROCYTE [DISTWIDTH] IN BLOOD BY AUTOMATED COUNT: 14.5 %
FASTING STATUS PATIENT QL REPORTED: NO
GLOBULIN PLAS-MCNC: 3.7 G/DL (ref 2.8–4.4)
GLUCOSE BLD-MCNC: 98 MG/DL (ref 70–99)
HCT VFR BLD AUTO: 36.2 %
HGB BLD-MCNC: 12.4 G/DL
IMM GRANULOCYTES # BLD AUTO: 0 X10(3) UL (ref 0–1)
IMM GRANULOCYTES NFR BLD: 0 %
LYMPHOCYTES # BLD AUTO: 0.73 X10(3) UL (ref 1–4)
LYMPHOCYTES NFR BLD AUTO: 39.2 %
MCH RBC QN AUTO: 31.1 PG (ref 26–34)
MCHC RBC AUTO-ENTMCNC: 34.3 G/DL (ref 31–37)
MCV RBC AUTO: 90.7 FL
MONOCYTES # BLD AUTO: 0.19 X10(3) UL (ref 0.1–1)
MONOCYTES NFR BLD AUTO: 10.2 %
MORPHOLOGY: NORMAL
NEUTROPHILS # BLD AUTO: 0.81 X10 (3) UL (ref 1.5–7.7)
NEUTROPHILS # BLD AUTO: 0.81 X10(3) UL (ref 1.5–7.7)
NEUTROPHILS NFR BLD AUTO: 43.6 %
OSMOLALITY SERPL CALC.SUM OF ELEC: 286 MOSM/KG (ref 275–295)
PLATELET # BLD AUTO: 130 10(3)UL (ref 150–450)
POTASSIUM SERPL-SCNC: 3.8 MMOL/L (ref 3.5–5.1)
PROT SERPL-MCNC: 7.1 G/DL (ref 6.4–8.2)
RBC # BLD AUTO: 3.99 X10(6)UL
SODIUM SERPL-SCNC: 138 MMOL/L (ref 136–145)
WBC # BLD AUTO: 1.9 X10(3) UL (ref 4–11)

## 2023-11-03 PROCEDURE — 99215 OFFICE O/P EST HI 40 MIN: CPT | Performed by: INTERNAL MEDICINE

## 2023-11-03 PROCEDURE — 96374 THER/PROPH/DIAG INJ IV PUSH: CPT

## 2023-11-03 RX ORDER — HYDROCODONE BITARTRATE AND ACETAMINOPHEN 10; 325 MG/1; MG/1
TABLET ORAL
COMMUNITY
Start: 2023-10-09

## 2023-11-03 NOTE — PROGRESS NOTES
Education Record    Learner:  Patient    Disease / Debbora Jump of breast metastatic to multiple sites, unspecified laterality     Barriers / Limitations:  None   Comments:    Method:  Brief focused   Comments:    General Topics:  Diet, Infection, Medication, Pain, Precautions, Procedure, Side effects and symptom management, Plan of care reviewed, and Fall risk and prevention   Comments:    Outcome:  Shows understanding   Comments:

## 2023-11-03 NOTE — PROGRESS NOTES
Education Record    Learner:  Patient/ spouse    Disease / Diagnosis:met breast cancer      Barriers / Limitations:  None   Comments:    Method:  Discussion   Comments:    General Topics:  Plan of care reviewed   Comments:    Outcome:  Shows understanding   Comments:   Abemaciclib/ letrozole/ zometa. Pt has been simulated, waiting to be called for start date. Denies side effects from zometa, and or abemaciclib. No diarrhea, had a little constipation. Reports she did have a bad headache all last night unable to sleep, some cold symptoms too. At 10am the headache went away. 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 Lower stamina. Questions addressed regarding taking norco prn. Has seen the dentist as well.

## 2023-12-01 ENCOUNTER — OFFICE VISIT (OUTPATIENT)
Dept: HEMATOLOGY/ONCOLOGY | Facility: HOSPITAL | Age: 68
End: 2023-12-01
Attending: INTERNAL MEDICINE
Payer: MEDICARE

## 2023-12-01 VITALS
SYSTOLIC BLOOD PRESSURE: 130 MMHG | WEIGHT: 116 LBS | HEIGHT: 62.01 IN | BODY MASS INDEX: 21.08 KG/M2 | DIASTOLIC BLOOD PRESSURE: 79 MMHG | TEMPERATURE: 99 F | OXYGEN SATURATION: 97 % | HEART RATE: 61 BPM | RESPIRATION RATE: 16 BRPM

## 2023-12-01 DIAGNOSIS — C79.51 PAIN FROM BONE METASTASES (HCC): ICD-10-CM

## 2023-12-01 DIAGNOSIS — K20.90 ESOPHAGITIS: ICD-10-CM

## 2023-12-01 DIAGNOSIS — C50.919 CARCINOMA OF BREAST METASTATIC TO MULTIPLE SITES, UNSPECIFIED LATERALITY (HCC): Primary | ICD-10-CM

## 2023-12-01 DIAGNOSIS — C50.912 CARCINOMA OF LEFT BREAST METASTATIC TO MULTIPLE SITES (HCC): Primary | ICD-10-CM

## 2023-12-01 DIAGNOSIS — D70.2 NEUTROPENIA, DRUG-INDUCED (HCC): ICD-10-CM

## 2023-12-01 DIAGNOSIS — C50.919 CARCINOMA OF BREAST METASTATIC TO MULTIPLE SITES, UNSPECIFIED LATERALITY (HCC): ICD-10-CM

## 2023-12-01 DIAGNOSIS — G89.3 PAIN FROM BONE METASTASES (HCC): ICD-10-CM

## 2023-12-01 DIAGNOSIS — T50.905D ADVERSE EFFECT OF DRUG, SUBSEQUENT ENCOUNTER: ICD-10-CM

## 2023-12-01 LAB
ALBUMIN SERPL-MCNC: 3.5 G/DL (ref 3.4–5)
ALBUMIN/GLOB SERPL: 1.1 {RATIO} (ref 1–2)
ALP LIVER SERPL-CCNC: 254 U/L
ALT SERPL-CCNC: 37 U/L
ANION GAP SERPL CALC-SCNC: 4 MMOL/L (ref 0–18)
AST SERPL-CCNC: 36 U/L (ref 15–37)
BASOPHILS # BLD AUTO: 0.08 X10(3) UL (ref 0–0.2)
BASOPHILS NFR BLD AUTO: 2.8 %
BILIRUB SERPL-MCNC: 0.5 MG/DL (ref 0.1–2)
BRCA1 C.185DELAG BLD/T QL: 80.3 U/ML (ref ?–38)
BUN BLD-MCNC: 11 MG/DL (ref 9–23)
CALCIUM BLD-MCNC: 9 MG/DL (ref 8.5–10.1)
CHLORIDE SERPL-SCNC: 111 MMOL/L (ref 98–112)
CO2 SERPL-SCNC: 28 MMOL/L (ref 21–32)
CREAT BLD-MCNC: 0.95 MG/DL
EGFRCR SERPLBLD CKD-EPI 2021: 65 ML/MIN/1.73M2 (ref 60–?)
EOSINOPHIL # BLD AUTO: 0.04 X10(3) UL (ref 0–0.7)
EOSINOPHIL NFR BLD AUTO: 1.4 %
ERYTHROCYTE [DISTWIDTH] IN BLOOD BY AUTOMATED COUNT: 15.7 %
FASTING STATUS PATIENT QL REPORTED: NO
GLOBULIN PLAS-MCNC: 3.3 G/DL (ref 2.8–4.4)
GLUCOSE BLD-MCNC: 86 MG/DL (ref 70–99)
HCT VFR BLD AUTO: 33.9 %
HGB BLD-MCNC: 11.6 G/DL
IMM GRANULOCYTES # BLD AUTO: 0.02 X10(3) UL (ref 0–1)
IMM GRANULOCYTES NFR BLD: 0.7 %
LYMPHOCYTES # BLD AUTO: 1.15 X10(3) UL (ref 1–4)
LYMPHOCYTES NFR BLD AUTO: 40.5 %
MCH RBC QN AUTO: 31.5 PG (ref 26–34)
MCHC RBC AUTO-ENTMCNC: 34.2 G/DL (ref 31–37)
MCV RBC AUTO: 92.1 FL
MONOCYTES # BLD AUTO: 0.22 X10(3) UL (ref 0.1–1)
MONOCYTES NFR BLD AUTO: 7.7 %
NEUTROPHILS # BLD AUTO: 1.33 X10 (3) UL (ref 1.5–7.7)
NEUTROPHILS # BLD AUTO: 1.33 X10(3) UL (ref 1.5–7.7)
NEUTROPHILS NFR BLD AUTO: 46.9 %
OSMOLALITY SERPL CALC.SUM OF ELEC: 295 MOSM/KG (ref 275–295)
PLATELET # BLD AUTO: 198 10(3)UL (ref 150–450)
POTASSIUM SERPL-SCNC: 4 MMOL/L (ref 3.5–5.1)
PROT SERPL-MCNC: 6.8 G/DL (ref 6.4–8.2)
RBC # BLD AUTO: 3.68 X10(6)UL
SODIUM SERPL-SCNC: 143 MMOL/L (ref 136–145)
WBC # BLD AUTO: 2.8 X10(3) UL (ref 4–11)

## 2023-12-01 PROCEDURE — 96374 THER/PROPH/DIAG INJ IV PUSH: CPT

## 2023-12-01 PROCEDURE — 99215 OFFICE O/P EST HI 40 MIN: CPT | Performed by: INTERNAL MEDICINE

## 2023-12-01 PROCEDURE — 85025 COMPLETE CBC W/AUTO DIFF WBC: CPT | Performed by: INTERNAL MEDICINE

## 2023-12-01 PROCEDURE — 86300 IMMUNOASSAY TUMOR CA 15-3: CPT | Performed by: INTERNAL MEDICINE

## 2023-12-01 PROCEDURE — 80053 COMPREHEN METABOLIC PANEL: CPT | Performed by: INTERNAL MEDICINE

## 2023-12-01 RX ORDER — SUCRALFATE ORAL 1 G/10ML
10 SUSPENSION ORAL
COMMUNITY
Start: 2023-11-27 | End: 2023-12-27

## 2023-12-01 NOTE — PROGRESS NOTES
Education Record    Learner:  Patient/ spouse    Disease / Diagnosis:met breast cancer  Abemaciclib/ letrozole. zometa    Barriers / Limitations:  None   Comments:    Method:  Discussion   Comments:    General Topics:  Plan of care reviewed   Comments:    Outcome:  Shows understanding   Comments:   RT to C spine in [place]. Fini yesterday, esophagitis, and skin redness. Some weight loss. Reviewed to push soft high calorie foods, taking norco prn, and carafate as prescribed. No other new pain. Some diarrhea, usually 1 day a week is bad, using imodium, questions addressed regarding taking the imodium. Questions addressed regarding assessing the treatment. Emotional support offered.

## 2023-12-01 NOTE — PROGRESS NOTES
Education Record    Learner:  Patient    Disease / Levie Meth infusion    Barriers / Limitations:  None   Comments:    Method:  Discussion   Comments:    General Topics:  Medication and Plan of care reviewed   Comments:    Outcome:  Shows understanding   Comments: Tolerated zometa infusion without incident. Discharged to home in stable condition with future appointment in place.

## 2023-12-15 ENCOUNTER — HOSPITAL ENCOUNTER (OUTPATIENT)
Dept: MRI IMAGING | Facility: HOSPITAL | Age: 68
Discharge: HOME | End: 2023-12-15
Attending: INTERNAL MEDICINE
Payer: MEDICARE

## 2023-12-15 DIAGNOSIS — G44.89 OTHER HEADACHE SYNDROME: ICD-10-CM

## 2023-12-15 DIAGNOSIS — C50.912 CARCINOMA OF LEFT BREAST METASTATIC TO MULTIPLE SITES (HCC): ICD-10-CM

## 2023-12-15 PROCEDURE — A9575 INJ GADOTERATE MEGLUMI 0.1ML: HCPCS | Performed by: INTERNAL MEDICINE

## 2023-12-15 PROCEDURE — 70553 MRI BRAIN STEM W/O & W/DYE: CPT | Performed by: INTERNAL MEDICINE

## 2023-12-15 RX ORDER — GADOTERATE MEGLUMINE 376.9 MG/ML
15 INJECTION INTRAVENOUS
Status: COMPLETED | OUTPATIENT
Start: 2023-12-15 | End: 2023-12-15

## 2023-12-15 RX ADMIN — GADOTERATE MEGLUMINE 15 ML: 376.9 INJECTION INTRAVENOUS at 15:06:00

## 2023-12-26 RX ORDER — LETROZOLE 2.5 MG/1
2.5 TABLET, FILM COATED ORAL DAILY
Qty: 30 TABLET | Refills: 3 | Status: SHIPPED | OUTPATIENT
Start: 2023-12-26

## 2023-12-29 ENCOUNTER — OFFICE VISIT (OUTPATIENT)
Dept: HEMATOLOGY/ONCOLOGY | Facility: HOSPITAL | Age: 68
End: 2023-12-29
Attending: INTERNAL MEDICINE
Payer: MEDICARE

## 2023-12-29 VITALS
TEMPERATURE: 98 F | DIASTOLIC BLOOD PRESSURE: 85 MMHG | OXYGEN SATURATION: 100 % | HEIGHT: 62.01 IN | SYSTOLIC BLOOD PRESSURE: 146 MMHG | BODY MASS INDEX: 20.81 KG/M2 | RESPIRATION RATE: 16 BRPM | WEIGHT: 114.5 LBS | HEART RATE: 69 BPM

## 2023-12-29 DIAGNOSIS — C50.919 CARCINOMA OF BREAST METASTATIC TO MULTIPLE SITES, UNSPECIFIED LATERALITY (HCC): ICD-10-CM

## 2023-12-29 DIAGNOSIS — C79.51 PAIN FROM BONE METASTASES (HCC): ICD-10-CM

## 2023-12-29 DIAGNOSIS — D70.2 NEUTROPENIA, DRUG-INDUCED (HCC): ICD-10-CM

## 2023-12-29 DIAGNOSIS — R79.89 ABNORMAL LFTS: ICD-10-CM

## 2023-12-29 DIAGNOSIS — G89.3 PAIN FROM BONE METASTASES (HCC): ICD-10-CM

## 2023-12-29 DIAGNOSIS — C50.519 MALIGNANT NEOPLASM OF LOWER-OUTER QUADRANT OF FEMALE BREAST (HCC): Primary | ICD-10-CM

## 2023-12-29 DIAGNOSIS — C50.919 CARCINOMA OF BREAST METASTATIC TO MULTIPLE SITES, UNSPECIFIED LATERALITY (HCC): Primary | ICD-10-CM

## 2023-12-29 DIAGNOSIS — T50.905D ADVERSE EFFECT OF DRUG, SUBSEQUENT ENCOUNTER: ICD-10-CM

## 2023-12-29 LAB
ALBUMIN SERPL-MCNC: 3.8 G/DL (ref 3.4–5)
ALBUMIN/GLOB SERPL: 1.3 {RATIO} (ref 1–2)
ALP LIVER SERPL-CCNC: 256 U/L
ALT SERPL-CCNC: 70 U/L
ANION GAP SERPL CALC-SCNC: 3 MMOL/L (ref 0–18)
AST SERPL-CCNC: 48 U/L (ref 15–37)
BASOPHILS # BLD AUTO: 0.06 X10(3) UL (ref 0–0.2)
BASOPHILS NFR BLD AUTO: 2.5 %
BILIRUB SERPL-MCNC: 0.7 MG/DL (ref 0.1–2)
BRCA1 C.185DELAG BLD/T QL: 82.2 U/ML (ref ?–38)
BUN BLD-MCNC: 15 MG/DL (ref 9–23)
CALCIUM BLD-MCNC: 8.6 MG/DL (ref 8.5–10.1)
CHLORIDE SERPL-SCNC: 109 MMOL/L (ref 98–112)
CO2 SERPL-SCNC: 27 MMOL/L (ref 21–32)
CREAT BLD-MCNC: 0.76 MG/DL
EGFRCR SERPLBLD CKD-EPI 2021: 85 ML/MIN/1.73M2 (ref 60–?)
EOSINOPHIL # BLD AUTO: 0.04 X10(3) UL (ref 0–0.7)
EOSINOPHIL NFR BLD AUTO: 1.7 %
ERYTHROCYTE [DISTWIDTH] IN BLOOD BY AUTOMATED COUNT: 16.1 %
FASTING STATUS PATIENT QL REPORTED: NO
GLOBULIN PLAS-MCNC: 2.9 G/DL (ref 2.8–4.4)
GLUCOSE BLD-MCNC: 72 MG/DL (ref 70–99)
HCT VFR BLD AUTO: 35.2 %
HGB BLD-MCNC: 11.7 G/DL
IMM GRANULOCYTES # BLD AUTO: 0 X10(3) UL (ref 0–1)
IMM GRANULOCYTES NFR BLD: 0 %
LYMPHOCYTES # BLD AUTO: 0.99 X10(3) UL (ref 1–4)
LYMPHOCYTES NFR BLD AUTO: 41.1 %
MCH RBC QN AUTO: 32.3 PG (ref 26–34)
MCHC RBC AUTO-ENTMCNC: 33.2 G/DL (ref 31–37)
MCV RBC AUTO: 97.2 FL
MONOCYTES # BLD AUTO: 0.21 X10(3) UL (ref 0.1–1)
MONOCYTES NFR BLD AUTO: 8.7 %
NEUTROPHILS # BLD AUTO: 1.11 X10 (3) UL (ref 1.5–7.7)
NEUTROPHILS # BLD AUTO: 1.11 X10(3) UL (ref 1.5–7.7)
NEUTROPHILS NFR BLD AUTO: 46 %
OSMOLALITY SERPL CALC.SUM OF ELEC: 287 MOSM/KG (ref 275–295)
PLATELET # BLD AUTO: 167 10(3)UL (ref 150–450)
POTASSIUM SERPL-SCNC: 3.8 MMOL/L (ref 3.5–5.1)
PROT SERPL-MCNC: 6.7 G/DL (ref 6.4–8.2)
RBC # BLD AUTO: 3.62 X10(6)UL
SODIUM SERPL-SCNC: 139 MMOL/L (ref 136–145)
WBC # BLD AUTO: 2.4 X10(3) UL (ref 4–11)

## 2023-12-29 PROCEDURE — 80053 COMPREHEN METABOLIC PANEL: CPT | Performed by: INTERNAL MEDICINE

## 2023-12-29 PROCEDURE — 86300 IMMUNOASSAY TUMOR CA 15-3: CPT | Performed by: INTERNAL MEDICINE

## 2023-12-29 PROCEDURE — 96374 THER/PROPH/DIAG INJ IV PUSH: CPT

## 2023-12-29 PROCEDURE — 85025 COMPLETE CBC W/AUTO DIFF WBC: CPT

## 2023-12-29 PROCEDURE — 99215 OFFICE O/P EST HI 40 MIN: CPT | Performed by: INTERNAL MEDICINE

## 2023-12-29 NOTE — PROGRESS NOTES
Education Record    Learner:  Patient/ spouse    Disease / Diagnosis:met breast ca  OTV Abemaciclib/ letrozole   Zometa. Barriers / Limitations:  None   Comments:    Method:  Discussion   Comments:    General Topics:  Plan of care reviewed   Comments:    Outcome:  Shows understanding   Comments:   Pt feeling well. Pain is inconsistent and not severe 4/10 on pain scale, back, jaw back T4 area. Takes Advil prn  Tolerating verzenio, may have diarrhea once a week. Occ hoarse voice still from RT, swallowing is normal.   No SE after zometa.

## 2023-12-29 NOTE — PROGRESS NOTES
Education Record    Learner:  Patient    Disease / Cleaster Fell inf    Barriers / Limitations:  None   Comments:    Method:  Discussion   Comments:    General Topics:  Medication and Plan of care reviewed   Comments:    Outcome:  Shows understanding   Comments: Tolerated zometa infusion without incident. Next appt in place.

## 2024-01-23 NOTE — PROGRESS NOTES
[facility] Progress Note      Patient Name:  Marija Elaine  YOB: 1955  Medical Record Number:  [id number]    Date of visit:  1/26/2024      CHIEF COMPLAINT: Metastatic breast carcinoma.    ONCOLOGY HISTORY:    Bone metastasis 9/23.  Biopsy T-spine 9/23-metastatic breast carcinoma.  Letrozole 9/23-  Abemaciclib 10/23-  RT 10/23-12/23    HPI:     68 year old female that I have followed since 9/23 after w/u of back pain and bone scan 9/23-multiple skeletal metastasis.  MRI C-spine showed metastatic disease at C6 with epidural tumor.  H/o L breast ca 2006 s/p L lumpectomy, RT, tamoxifen x 5 years.    Letrozole started 9/23.  MRI T-spine 9/23-diffuse osseous metastatic disease, most pronounced T2-T5.  No cord compression or pathologic fracture.  Mild epidural extension of tumor along T4, T5, T8.  PET scan 9/23-multiple areas of osseous metastatic disease.    CT biopsy T-spine 9/23-metastatic breast carcinoma, 50% ER, 25% NH, HER2 2+ by IHC, FISH pending.  Ki-67 10%.  Abemaciclib started 10/23.  RT completed, esophagitis symptoms have improved.  Back pain is better..    Recent trip to [place], walked without problem. Occasional diarrhea.  For rest of the year, she is working as a regular .    SOCIAL HISTORY:    , lives in [place], .  2 sons in [place].  Older is  for spinal cord stimulator implants.  He has a 4-1/2-year-old child.  Younger son has a 1-1/2-year-old child.    ROS:     Constitutional: no fever, chills fatigue,night sweats or weight loss  Neurologic: no headache, seizures, diplopia or weakness  Respiratory: no cough, SOB or hemoptysis  Cardiovascular: no chest pain, ankle swelling, RIOS  GI: no nausea, vomiting, diarrhea or BRBPR  Musculoskeletal: no body-ache or joint pain  Dermatologic: no alopecia, rash, pruritis  : no hematuria, dysuria or frequency  Psych: no confusion or depression   Heme: no easy bruising or  bleeding     ALLERGIES:    Allergies   Allergen Reactions    Levofloxacin HIVES     Levaquin         MEDICATIONS:    Current Outpatient Medications   Medication Sig Dispense Refill    letrozole 2.5 MG Oral Tab Take 1 tablet (2.5 mg total) by mouth daily. 30 tablet 3    HYDROcodone-acetaminophen  MG Oral Tab       Abemaciclib 150 MG Oral Tab Take 1 tablet (150 mg total) by mouth 2 (two) times daily. may take with or without food 60 tablet 6    Multiple Vitamin Oral Tab Take 1 tablet by mouth every other day.      ALPRAZolam 0.25 MG Oral Tab Take 1 tablet (0.25 mg total) by mouth As Directed.      Ibuprofen 200 MG Oral Cap Take by mouth as needed.      valsartan 160 MG Oral Tab Take 1 tablet (160 mg total) by mouth daily.         VITALS:  Height: 157.5 cm (5' 2.01\") (1051)  Weight: 53.8 kg (118 lb 8 oz) (1051)  BSA (Calculated - sq m): 1.53 sq meters (1051)  Pulse: 70 (1051)  BP: 143/84 (1051)  Temp: 98.3 °F (36.8 °C) (1051)  Do Not Use - Resp Rate: --  SpO2: 100 % (1051)    PS:  ECO    PHYSICAL EXAM:    General: alert and oriented x 3, not in acute distress.  Accompanied by .  HEENT: CAROL, oropharynx  clear.  Neck: supple.  No JVD /adenopathy.  CVS: S1S2, regular  Rhythm, no murmurs.   Lungs: Clear to auscultation and percussion.  Abdomen:   Extremities:  No edema.  CNS: no focal deficit    Emotional well being: Patient's emotional well being was assessed.  No issues requiring acute psychosocial intervention were identified.     LABS:     Recent Results (from the past 24 hour(s))   Comp Metabolic Panel (14)    Collection Time: 24 10:39 AM   Result Value Ref Range    Glucose 82 70 - 99 mg/dL    Sodium 139 136 - 145 mmol/L    Potassium 4.0 3.5 - 5.1 mmol/L    Chloride 109 98 - 112 mmol/L    CO2 28.0 21.0 - 32.0 mmol/L    Anion Gap 2 0 - 18 mmol/L    BUN 15 9 - 23 mg/dL    Creatinine 0.81 0.55 - 1.02 mg/dL    Calcium, Total 9.1 8.5 - 10.1 mg/dL     Calculated Osmolality 288 275 - 295 mOsm/kg    eGFR-Cr 79 >=60 mL/min/1.73m2    AST 30 15 - 37 U/L    ALT      Alkaline Phosphatase 195 (H) 55 - 142 U/L    Bilirubin, Total 0.6 0.1 - 2.0 mg/dL    Total Protein 7.1 6.4 - 8.2 g/dL    Albumin 3.7 3.4 - 5.0 g/dL    Globulin  3.4 2.8 - 4.4 g/dL    A/G Ratio 1.1 1.0 - 2.0    Patient Fasting for CMP? Patient not present    CA 27.29 [E]    Collection Time: 01/26/24 10:39 AM   Result Value Ref Range    Breast Carcinoma Ag [id number] 84.8 (H) <38.0 u/mL   CANCER ANTIGEN (CA) 15-3 [E]    Collection Time: 01/26/24 10:39 AM   Result Value Ref Range    Cancer Ag 15-3 (CA 15-3) 65.6 (H) <=35.0 U/mL   CBC W/ DIFFERENTIAL    Collection Time: 01/26/24 10:39 AM   Result Value Ref Range    WBC 2.6 (L) 4.0 - 11.0 x10(3) uL    RBC 3.61 (L) 3.80 - 5.30 x10(6)uL    HGB 12.3 12.0 - 16.0 g/dL    HCT 35.8 35.0 - 48.0 %    .0 150.0 - 450.0 10(3)uL    MCV 99.2 80.0 - 100.0 fL    MCH 34.1 (H) 26.0 - 34.0 pg    MCHC 34.4 31.0 - 37.0 g/dL    RDW 15.4 %    Neutrophil Absolute Prelim 1.06 (L) 1.50 - 7.70 x10 (3) uL    Neutrophil Absolute 1.06 (L) 1.50 - 7.70 x10(3) uL    Lymphocyte Absolute 1.15 1.00 - 4.00 x10(3) uL    Monocyte Absolute 0.24 0.10 - 1.00 x10(3) uL    Eosinophil Absolute 0.05 0.00 - 0.70 x10(3) uL    Basophil Absolute 0.07 0.00 - 0.20 x10(3) uL    Immature Granulocyte Absolute 0.01 0.00 - 1.00 x10(3) uL    Neutrophil % 41.1 %    Lymphocyte % 44.6 %    Monocyte % 9.3 %    Eosinophil % 1.9 %    Basophil % 2.7 %    Immature Granulocyte % 0.4 %     Lab Results   Component Value Date     65.6 (H) 01/26/2024     75.1 (H) 09/13/2023       ASSESSMENT AND PLAN:     # Metastatic breast carcinoma: Diag 9/23, bone only disease.  Bx proven involvement T spine.  ER/TX +, Her 2 neg.     Letrozole started 9/23.  Abemaciclib started 10/23.  Tumor markers improving.  PET scan 3/24.    # Bone metastasis: Started zoledronic acid 10/23, Denosumab not covered.  Continue monthly for now.    #  Severe back pain: Much better.  RT completed.  Only using OTC Advil.    # Abnormal LFTs: Since diagnosis 10/23.  No liver metastasis on imaging.  Normalized.    # Headache: MRI brain 12/23 negative.    # Neutropenia: Due to abemaciclib.  Stable.    ORDERS PLACED:    Return in about 4 weeks (around 2/23/2024) for Labs, MD visit, Infusion.    Michelle Reinoso M.D.    [facility]    [address] [place], [address]    1/26/2024

## 2024-01-26 ENCOUNTER — OFFICE VISIT (OUTPATIENT)
Dept: HEMATOLOGY/ONCOLOGY | Facility: HOSPITAL | Age: 69
End: 2024-01-26
Attending: INTERNAL MEDICINE
Payer: MEDICARE

## 2024-01-26 VITALS
TEMPERATURE: 98 F | HEART RATE: 70 BPM | WEIGHT: 118.5 LBS | OXYGEN SATURATION: 100 % | DIASTOLIC BLOOD PRESSURE: 84 MMHG | RESPIRATION RATE: 14 BRPM | HEIGHT: 62.01 IN | BODY MASS INDEX: 21.53 KG/M2 | SYSTOLIC BLOOD PRESSURE: 143 MMHG

## 2024-01-26 DIAGNOSIS — C50.919 CARCINOMA OF BREAST METASTATIC TO MULTIPLE SITES, UNSPECIFIED LATERALITY (HCC): Primary | ICD-10-CM

## 2024-01-26 DIAGNOSIS — T50.905D ADVERSE EFFECT OF DRUG, SUBSEQUENT ENCOUNTER: ICD-10-CM

## 2024-01-26 DIAGNOSIS — D70.2 NEUTROPENIA, DRUG-INDUCED (HCC): ICD-10-CM

## 2024-01-26 DIAGNOSIS — R79.89 ABNORMAL LFTS: ICD-10-CM

## 2024-01-26 DIAGNOSIS — G89.3 PAIN FROM BONE METASTASES (HCC): ICD-10-CM

## 2024-01-26 DIAGNOSIS — C79.51 PAIN FROM BONE METASTASES (HCC): ICD-10-CM

## 2024-01-26 LAB
ALBUMIN SERPL-MCNC: 3.7 G/DL (ref 3.4–5)
ALBUMIN/GLOB SERPL: 1.1 {RATIO} (ref 1–2)
ALP LIVER SERPL-CCNC: 195 U/L
ANION GAP SERPL CALC-SCNC: 2 MMOL/L (ref 0–18)
AST SERPL-CCNC: 30 U/L (ref 15–37)
BASOPHILS # BLD AUTO: 0.07 X10(3) UL (ref 0–0.2)
BASOPHILS NFR BLD AUTO: 2.7 %
BILIRUB SERPL-MCNC: 0.6 MG/DL (ref 0.1–2)
BRCA1 C.185DELAG BLD/T QL: 84.8 U/ML (ref ?–38)
BUN BLD-MCNC: 15 MG/DL (ref 9–23)
CALCIUM BLD-MCNC: 9.1 MG/DL (ref 8.5–10.1)
CANCER AG15-3 SERPL-ACNC: 65.6 U/ML (ref ?–35)
CHLORIDE SERPL-SCNC: 109 MMOL/L (ref 98–112)
CO2 SERPL-SCNC: 28 MMOL/L (ref 21–32)
CREAT BLD-MCNC: 0.81 MG/DL
EGFRCR SERPLBLD CKD-EPI 2021: 79 ML/MIN/1.73M2 (ref 60–?)
EOSINOPHIL # BLD AUTO: 0.05 X10(3) UL (ref 0–0.7)
EOSINOPHIL NFR BLD AUTO: 1.9 %
ERYTHROCYTE [DISTWIDTH] IN BLOOD BY AUTOMATED COUNT: 15.4 %
GLOBULIN PLAS-MCNC: 3.4 G/DL (ref 2.8–4.4)
GLUCOSE BLD-MCNC: 82 MG/DL (ref 70–99)
HCT VFR BLD AUTO: 35.8 %
HGB BLD-MCNC: 12.3 G/DL
IMM GRANULOCYTES # BLD AUTO: 0.01 X10(3) UL (ref 0–1)
IMM GRANULOCYTES NFR BLD: 0.4 %
LYMPHOCYTES # BLD AUTO: 1.15 X10(3) UL (ref 1–4)
LYMPHOCYTES NFR BLD AUTO: 44.6 %
MCH RBC QN AUTO: 34.1 PG (ref 26–34)
MCHC RBC AUTO-ENTMCNC: 34.4 G/DL (ref 31–37)
MCV RBC AUTO: 99.2 FL
MONOCYTES # BLD AUTO: 0.24 X10(3) UL (ref 0.1–1)
MONOCYTES NFR BLD AUTO: 9.3 %
NEUTROPHILS # BLD AUTO: 1.06 X10 (3) UL (ref 1.5–7.7)
NEUTROPHILS # BLD AUTO: 1.06 X10(3) UL (ref 1.5–7.7)
NEUTROPHILS NFR BLD AUTO: 41.1 %
OSMOLALITY SERPL CALC.SUM OF ELEC: 288 MOSM/KG (ref 275–295)
PLATELET # BLD AUTO: 163 10(3)UL (ref 150–450)
POTASSIUM SERPL-SCNC: 4 MMOL/L (ref 3.5–5.1)
PROT SERPL-MCNC: 7.1 G/DL (ref 6.4–8.2)
RBC # BLD AUTO: 3.61 X10(6)UL
SODIUM SERPL-SCNC: 139 MMOL/L (ref 136–145)
WBC # BLD AUTO: 2.6 X10(3) UL (ref 4–11)

## 2024-01-26 PROCEDURE — 99215 OFFICE O/P EST HI 40 MIN: CPT | Performed by: INTERNAL MEDICINE

## 2024-01-26 PROCEDURE — 96365 THER/PROPH/DIAG IV INF INIT: CPT

## 2024-01-26 NOTE — PROGRESS NOTES
Education Record    Learner:  Patient/ spouse    Disease / Diagnosis: met breast ca  OTV Zometa     Barriers / Limitations:  None   Comments:    Method:  Discussion   Comments:    General Topics:  Plan of care reviewed   Comments:    Outcome:  Shows understanding   Comments:   Abemaciclib/ letrozole.   Pt has diarrhea, about twice per week, takes imodium about twice per week too.   Questions addressed regarding side effects.   Pain comes and goes, takes advil prn for pain.

## 2024-01-26 NOTE — PROGRESS NOTES
Education Record    Learner:  Patient    Disease / Diagnosis: Bone mets    Barriers / Limitations:  None   Comments:    Method:  Brief focused and Reinforcement   Comments:    General Topics:  Plan of care reviewed   Comments:    Outcome:  Shows understanding   Comments:    Patient tolerated Zometa and discharged in stable condition.

## 2024-02-22 NOTE — PROGRESS NOTES
[facility] Progress Note      Patient Name:  Marija Elaine  YOB: 1955  Medical Record Number:  [id number]    Date of visit:  2/23/2024    CHIEF COMPLAINT: Metastatic breast carcinoma.    ONCOLOGY HISTORY:    Bone metastasis 9/23.  Biopsy T-spine 9/23-metastatic breast carcinoma.  Letrozole 9/23-  Abemaciclib 10/23-  RT 10/23-12/23    HPI:     68 year old female that I have followed since 9/23 after w/u of back pain and bone scan 9/23-multiple skeletal metastasis.  MRI C-spine showed metastatic disease at C6 with epidural tumor.  H/o L breast ca 2006 s/p L lumpectomy, RT, tamoxifen x 5 years.    Letrozole started 9/23.  MRI T-spine 9/23-diffuse osseous metastatic disease, most pronounced T2-T5.  No cord compression or pathologic fracture.  Mild epidural extension of tumor along T4, T5, T8.  PET scan 9/23-multiple areas of osseous metastatic disease.    CT biopsy T-spine 9/23-metastatic breast carcinoma, 50% ER, 25% CO, HER2 2+ by IHC, FISH pending.  Ki-67 10%.  Abemaciclib started 10/23.  RT completed, esophagitis symptoms have improved.  Back pain is better.    Started working regularly as a  a month back.  Little bit more fatigue and hair thinning.  Diarrhea about once a week.  Has lost some weight.    SOCIAL HISTORY:    , lives in [place], .  2 sons in [place].  Older is  for spinal cord stimulator implants.  He has a 4-1/2-year-old child.  Younger son has a 1-1/2-year-old child.    ROS:     Constitutional: no fever, chills fatigue,night sweats or weight loss  Neurologic: no headache, seizures, diplopia or weakness  Respiratory: no cough, SOB or hemoptysis  Cardiovascular: no chest pain, ankle swelling, RIOS  GI: no nausea, vomiting, diarrhea or BRBPR  Musculoskeletal: no body-ache or joint pain  Dermatologic: no alopecia, rash, pruritis  : no hematuria, dysuria or frequency  Psych: no confusion or depression    Heme: no easy bruising or bleeding     ALLERGIES:    Allergies   Allergen Reactions    Levofloxacin HIVES     Levaquin         MEDICATIONS:    Current Outpatient Medications   Medication Sig Dispense Refill    letrozole 2.5 MG Oral Tab Take 1 tablet (2.5 mg total) by mouth daily. 30 tablet 3    HYDROcodone-acetaminophen  MG Oral Tab       Abemaciclib 150 MG Oral Tab Take 1 tablet (150 mg total) by mouth 2 (two) times daily. may take with or without food 60 tablet 6    Multiple Vitamin Oral Tab Take 1 tablet by mouth every other day.      ALPRAZolam 0.25 MG Oral Tab Take 1 tablet (0.25 mg total) by mouth As Directed.      Ibuprofen 200 MG Oral Cap Take by mouth as needed.      valsartan 160 MG Oral Tab Take 1 tablet (160 mg total) by mouth daily.         VITALS:  Height: 157.5 cm (5' 2.01\") (1042)  Weight: 52.4 kg (115 lb 8 oz) (1042)  BSA (Calculated - sq m): 1.51 sq meters (1042)  Pulse: 64 (1042)  BP: 148/82 (1042)  Temp: 98.4 °F (36.9 °C) (1042)  Do Not Use - Resp Rate: --  SpO2: 100 % (1042)    PS:  ECO    PHYSICAL EXAM:    General: alert and oriented x 3, not in acute distress.  Accompanied by .  HEENT: CAROL, oropharynx  clear.  Neck: supple.  No JVD /adenopathy.  CVS: S1S2, regular  Rhythm, no murmurs.   Lungs: Clear to auscultation and percussion.  Abdomen:   Extremities:  No edema.  CNS: no focal deficit    Emotional well being: Patient's emotional well being was assessed.  No issues requiring acute psychosocial intervention were identified.     LABS:     Recent Results (from the past 24 hour(s))   Comp Metabolic Panel (14)    Collection Time: 24 10:25 AM   Result Value Ref Range    Glucose 129 (H) 70 - 99 mg/dL    Sodium 138 136 - 145 mmol/L    Potassium 4.0 3.5 - 5.1 mmol/L    Chloride 108 98 - 112 mmol/L    CO2 28.0 21.0 - 32.0 mmol/L    Anion Gap 2 0 - 18 mmol/L    BUN 13 9 - 23 mg/dL    Creatinine 0.88 0.55 - 1.02 mg/dL    Calcium,  Total 9.2 8.5 - 10.1 mg/dL    Calculated Osmolality 288 275 - 295 mOsm/kg    eGFR-Cr 71 >=60 mL/min/1.73m2    AST 29 15 - 37 U/L    ALT 36 13 - 56 U/L    Alkaline Phosphatase 152 (H) 55 - 142 U/L    Bilirubin, Total 0.6 0.1 - 2.0 mg/dL    Total Protein 6.8 6.4 - 8.2 g/dL    Albumin 3.7 3.4 - 5.0 g/dL    Globulin  3.1 2.8 - 4.4 g/dL    A/G Ratio 1.2 1.0 - 2.0    Patient Fasting for CMP? Patient not present    CBC W/ DIFFERENTIAL    Collection Time: 02/23/24 10:25 AM   Result Value Ref Range    WBC 2.9 (L) 4.0 - 11.0 x10(3) uL    RBC 3.57 (L) 3.80 - 5.30 x10(6)uL    HGB 12.5 12.0 - 16.0 g/dL    HCT 35.8 35.0 - 48.0 %    .0 (L) 150.0 - 450.0 10(3)uL    .3 (H) 80.0 - 100.0 fL    MCH 35.0 (H) 26.0 - 34.0 pg    MCHC 34.9 31.0 - 37.0 g/dL    RDW 13.6 %    Neutrophil Absolute Prelim 1.47 (L) 1.50 - 7.70 x10 (3) uL    Neutrophil Absolute 1.47 (L) 1.50 - 7.70 x10(3) uL    Lymphocyte Absolute 1.04 1.00 - 4.00 x10(3) uL    Monocyte Absolute 0.28 0.10 - 1.00 x10(3) uL    Eosinophil Absolute 0.04 0.00 - 0.70 x10(3) uL    Basophil Absolute 0.05 0.00 - 0.20 x10(3) uL    Immature Granulocyte Absolute 0.01 0.00 - 1.00 x10(3) uL    Neutrophil % 50.9 %    Lymphocyte % 36.0 %    Monocyte % 9.7 %    Eosinophil % 1.4 %    Basophil % 1.7 %    Immature Granulocyte % 0.3 %       Lab Results   Component Value Date     65.6 (H) 01/26/2024     75.1 (H) 09/13/2023       ASSESSMENT AND PLAN:     # Metastatic breast carcinoma: Diag 9/23, bone only disease.  Bx proven involvement T spine.  ER/[place] +, Her 2 neg.     Letrozole started 9/23.  Abemaciclib started 10/23.  Tumor markers improving.  PET scan 3/24.    # Bone metastasis: Started zoledronic acid 10/23, Denosumab not covered.  Continue monthly for now.    # Severe back pain: Much better.  RT completed.  Only using OTC Advil.    # Abnormal LFTs: Since diagnosis 10/23.  No liver metastasis on imaging.  Normalized.    # Headache: MRI brain 12/23 negative.    # Neutropenia:  Due to abemaciclib.  Stable.    # Weight loss: Recommend small meals, high-protein.  If diarrhea is persistent, can consider reducing abemaciclib dose once PET scan done.  May hold for a couple of days before she sees me next time to see if it makes a difference.    ORDERS PLACED:        Procedures    PET STANDARD BODY SCAN (ONCOLOGY) (CPT=78815)     Return in about 4 weeks (around 3/22/2024) for Labs, MD visit, Infusion.    Michelle Reinoso M.D.    [facility]    [address] [place] [address]   [place], [place], [address]    2/23/2024

## 2024-02-23 ENCOUNTER — OFFICE VISIT (OUTPATIENT)
Dept: HEMATOLOGY/ONCOLOGY | Facility: HOSPITAL | Age: 69
End: 2024-02-23
Attending: INTERNAL MEDICINE
Payer: MEDICARE

## 2024-02-23 VITALS
DIASTOLIC BLOOD PRESSURE: 82 MMHG | BODY MASS INDEX: 20.99 KG/M2 | HEART RATE: 64 BPM | RESPIRATION RATE: 16 BRPM | TEMPERATURE: 98 F | HEIGHT: 62.01 IN | WEIGHT: 115.5 LBS | OXYGEN SATURATION: 100 % | SYSTOLIC BLOOD PRESSURE: 148 MMHG

## 2024-02-23 DIAGNOSIS — C50.919 CARCINOMA OF BREAST METASTATIC TO MULTIPLE SITES, UNSPECIFIED LATERALITY (HCC): Primary | ICD-10-CM

## 2024-02-23 DIAGNOSIS — M89.9 LYTIC LESION OF BONE ON X-RAY: ICD-10-CM

## 2024-02-23 DIAGNOSIS — G89.3 PAIN FROM BONE METASTASES (HCC): ICD-10-CM

## 2024-02-23 DIAGNOSIS — T50.905D ADVERSE EFFECT OF DRUG, SUBSEQUENT ENCOUNTER: ICD-10-CM

## 2024-02-23 DIAGNOSIS — C79.51 PAIN FROM BONE METASTASES (HCC): ICD-10-CM

## 2024-02-23 DIAGNOSIS — R94.8 ABNORMAL BONE SCAN OF THORACIC SPINE: ICD-10-CM

## 2024-02-23 DIAGNOSIS — G89.3 PAIN FROM BONE METASTASES (HCC): Primary | ICD-10-CM

## 2024-02-23 DIAGNOSIS — C50.919 CARCINOMA OF BREAST METASTATIC TO MULTIPLE SITES, UNSPECIFIED LATERALITY (HCC): ICD-10-CM

## 2024-02-23 DIAGNOSIS — C79.51 PAIN FROM BONE METASTASES (HCC): Primary | ICD-10-CM

## 2024-02-23 LAB
ALBUMIN SERPL-MCNC: 3.7 G/DL (ref 3.4–5)
ALBUMIN/GLOB SERPL: 1.2 {RATIO} (ref 1–2)
ALP LIVER SERPL-CCNC: 152 U/L
ALT SERPL-CCNC: 36 U/L
ANION GAP SERPL CALC-SCNC: 2 MMOL/L (ref 0–18)
AST SERPL-CCNC: 29 U/L (ref 15–37)
BASOPHILS # BLD AUTO: 0.05 X10(3) UL (ref 0–0.2)
BASOPHILS NFR BLD AUTO: 1.7 %
BILIRUB SERPL-MCNC: 0.6 MG/DL (ref 0.1–2)
BRCA1 C.185DELAG BLD/T QL: 73.3 U/ML (ref ?–38)
BUN BLD-MCNC: 13 MG/DL (ref 9–23)
CALCIUM BLD-MCNC: 9.2 MG/DL (ref 8.5–10.1)
CANCER AG15-3 SERPL-ACNC: 59.1 U/ML (ref ?–35)
CHLORIDE SERPL-SCNC: 108 MMOL/L (ref 98–112)
CO2 SERPL-SCNC: 28 MMOL/L (ref 21–32)
CREAT BLD-MCNC: 0.88 MG/DL
EGFRCR SERPLBLD CKD-EPI 2021: 71 ML/MIN/1.73M2 (ref 60–?)
EOSINOPHIL # BLD AUTO: 0.04 X10(3) UL (ref 0–0.7)
EOSINOPHIL NFR BLD AUTO: 1.4 %
ERYTHROCYTE [DISTWIDTH] IN BLOOD BY AUTOMATED COUNT: 13.6 %
GLOBULIN PLAS-MCNC: 3.1 G/DL (ref 2.8–4.4)
GLUCOSE BLD-MCNC: 129 MG/DL (ref 70–99)
HCT VFR BLD AUTO: 35.8 %
HGB BLD-MCNC: 12.5 G/DL
IMM GRANULOCYTES # BLD AUTO: 0.01 X10(3) UL (ref 0–1)
IMM GRANULOCYTES NFR BLD: 0.3 %
LYMPHOCYTES # BLD AUTO: 1.04 X10(3) UL (ref 1–4)
LYMPHOCYTES NFR BLD AUTO: 36 %
MCH RBC QN AUTO: 35 PG (ref 26–34)
MCHC RBC AUTO-ENTMCNC: 34.9 G/DL (ref 31–37)
MCV RBC AUTO: 100.3 FL
MONOCYTES # BLD AUTO: 0.28 X10(3) UL (ref 0.1–1)
MONOCYTES NFR BLD AUTO: 9.7 %
NEUTROPHILS # BLD AUTO: 1.47 X10 (3) UL (ref 1.5–7.7)
NEUTROPHILS # BLD AUTO: 1.47 X10(3) UL (ref 1.5–7.7)
NEUTROPHILS NFR BLD AUTO: 50.9 %
OSMOLALITY SERPL CALC.SUM OF ELEC: 288 MOSM/KG (ref 275–295)
PLATELET # BLD AUTO: 148 10(3)UL (ref 150–450)
POTASSIUM SERPL-SCNC: 4 MMOL/L (ref 3.5–5.1)
PROT SERPL-MCNC: 6.8 G/DL (ref 6.4–8.2)
RBC # BLD AUTO: 3.57 X10(6)UL
SODIUM SERPL-SCNC: 138 MMOL/L (ref 136–145)
WBC # BLD AUTO: 2.9 X10(3) UL (ref 4–11)

## 2024-02-23 PROCEDURE — 96374 THER/PROPH/DIAG INJ IV PUSH: CPT

## 2024-02-23 PROCEDURE — 99215 OFFICE O/P EST HI 40 MIN: CPT | Performed by: INTERNAL MEDICINE

## 2024-02-23 NOTE — PROGRESS NOTES
[facility] Note:  Renal Dose Adjustment for Zometa (Zoledronic acid)    Marija Elaine is a 68 year old female has been prescribed Zometa (Zoledronic acid)  4 mg IVPB once.    LABS:   Lab Results   Component Value Date/Time    CREATSERUM 0.88 02/23/2024 10:25 AM    BUN 13 02/23/2024 10:25 AM         CrCl: Estimated Creatinine Clearance: 49 mL/min (based on SCr of 0.88 mg/dL and ABW=52 kg).    Calculated creatinine clearance is 40 to 49 mL/minute therefore, the dose of Zometa (Zoledronic acid) has been changed to 3.3 mg IVPB once per P&T approved protocol.     Thank you,  Deborah French RP  2/23/2024 11:41 AM

## 2024-02-23 NOTE — PROGRESS NOTES
Education Record    Learner:  Patient    Disease / Diagnosis: Breast cancer, bone mets    Barriers / Limitations:  None   Comments:    Method:  Brief focused and Reinforcement   Comments:    General Topics:  Plan of care reviewed   Comments:    Outcome:  Shows understanding   Comments:    Patient tolerated zometa infusion and discharged in stable condition.

## 2024-03-11 ENCOUNTER — HOSPITAL ENCOUNTER (OUTPATIENT)
Dept: NUCLEAR MEDICINE | Facility: HOSPITAL | Age: 69
Discharge: HOME | End: 2024-03-11
Attending: INTERNAL MEDICINE
Payer: MEDICARE

## 2024-03-11 DIAGNOSIS — G89.3 PAIN FROM BONE METASTASES (HCC): ICD-10-CM

## 2024-03-11 DIAGNOSIS — R94.8 ABNORMAL BONE SCAN OF THORACIC SPINE: ICD-10-CM

## 2024-03-11 DIAGNOSIS — M89.9 LYTIC LESION OF BONE ON X-RAY: ICD-10-CM

## 2024-03-11 DIAGNOSIS — C50.919 CARCINOMA OF BREAST METASTATIC TO MULTIPLE SITES, UNSPECIFIED LATERALITY (HCC): ICD-10-CM

## 2024-03-11 DIAGNOSIS — C79.51 PAIN FROM BONE METASTASES (HCC): ICD-10-CM

## 2024-03-11 LAB — GLUCOSE BLD-MCNC: 74 MG/DL (ref 70–99)

## 2024-03-11 PROCEDURE — 78815 PET IMAGE W/CT SKULL-THIGH: CPT | Performed by: INTERNAL MEDICINE

## 2024-03-11 PROCEDURE — 82962 GLUCOSE BLOOD TEST: CPT

## 2024-03-19 NOTE — PROGRESS NOTES
[facility] Progress Note      Patient Name:  Marija Elaine  YOB: 1955  Medical Record Number:  [id number]    Date of visit:  3/20/2024    CHIEF COMPLAINT: Metastatic breast carcinoma.    ONCOLOGY HISTORY:    Bone metastasis 9/23.  Biopsy T-spine 9/23-metastatic breast carcinoma.  Letrozole 9/23-  Abemaciclib 10/23-  RT 10/23-12/23    HPI:     68 year old female that I have followed since 9/23 after w/u of back pain and bone scan 9/23-multiple skeletal metastasis.  MRI C-spine showed metastatic disease at C6 with epidural tumor.  H/o L breast ca 2006 s/p L lumpectomy, RT, tamoxifen x 5 years.    Letrozole started 9/23.  MRI T-spine 9/23-diffuse osseous metastatic disease, most pronounced T2-T5.  No cord compression or pathologic fracture.  Mild epidural extension of tumor along T4, T5, T8.  PET scan 9/23-multiple areas of osseous metastatic disease.    CT biopsy T-spine 9/23-metastatic breast carcinoma, 50% ER, 25% IA, HER2 2+ by IHC, FISH pending.  Ki-67 10%.  Abemaciclib started 10/23.      Diarrhea with abemaciclib.  She held it for the last week and symptoms did get better.  Energy has improved.  Working as a .  Significant hair thinning.  Appetite bit better. Going to [place].    SOCIAL HISTORY:    , lives in [place], .  2 sons in [place].  Older is  for spinal cord stimulator implants.  He has a 4-1/2-year-old child.  Younger son has a 1-1/2-year-old child.    ROS:     Constitutional: no fever, chills fatigue,night sweats or weight loss  Neurologic: no headache, seizures, diplopia or weakness  Respiratory: no cough, SOB or hemoptysis  Cardiovascular: no chest pain, ankle swelling, RIOS  GI: no nausea, vomiting, diarrhea or BRBPR  Musculoskeletal: no body-ache or joint pain  Dermatologic: no alopecia, rash, pruritis  : no hematuria, dysuria or frequency  Psych: no confusion or depression   Heme: no easy  bruising or bleeding     ALLERGIES:    Allergies   Allergen Reactions    Levofloxacin HIVES     Levaquin         MEDICATIONS:    Current Outpatient Medications   Medication Sig Dispense Refill    letrozole 2.5 MG Oral Tab Take 1 tablet (2.5 mg total) by mouth daily. 30 tablet 3    Multiple Vitamin Oral Tab Take 1 tablet by mouth every other day.      ALPRAZolam 0.25 MG Oral Tab Take 1 tablet (0.25 mg total) by mouth As Directed.      Ibuprofen 200 MG Oral Cap Take by mouth as needed.      valsartan 160 MG Oral Tab Take 1 tablet (160 mg total) by mouth daily.      HYDROcodone-acetaminophen  MG Oral Tab  (Patient not taking: Reported on 3/20/2024)      Abemaciclib 150 MG Oral Tab Take 1 tablet (150 mg total) by mouth 2 (two) times daily. may take with or without food (Patient not taking: Reported on 3/20/2024) 60 tablet 6       VITALS:  Height: 157.5 cm (5' 2.01\") (1033)  Weight: 52.4 kg (115 lb 8 oz) (1033)  BSA (Calculated - sq m): 1.51 sq meters (1033)  Pulse: 70 (1033)  BP: 183/102 (1033)  Temp: 98.1 °F (36.7 °C) (1033)  Do Not Use - Resp Rate: --  SpO2: 99 % (1033)    PS:  ECO    PHYSICAL EXAM:    General: alert and oriented x 3, not in acute distress.  Accompanied by .  HEENT: CAROL, oropharynx  clear.  Neck: supple.  No JVD /adenopathy.  CVS: S1S2, regular  Rhythm, no murmurs.   Lungs: Clear to auscultation and percussion.  Abdomen:   Extremities:  No edema.  CNS: no focal deficit    Emotional well being: Patient's emotional well being was assessed.  No issues requiring acute psychosocial intervention were identified.     IMAGING:    PET STANDARD BODY SCAN (ONCOLOGY) (CPT=78815)    Result Date: 3/11/2024          CONCLUSION:  There is mixed response to treatment.  There are areas of significant improvement in the mid to upper dorsal spine including the posterior elements with a few foci of worsening disease in T6, T10, L1 and L2.  Mild improvement to  stable disease in the pelvis and proximal femurs.  No new disease.  No lymphadenopathy. Stable 6 cm cyst without abnormal FDG activity in the right adnexa.   L    LABS:     Recent Results (from the past 24 hour(s))   Comp Metabolic Panel (14)    Collection Time: 03/20/24 10:28 AM   Result Value Ref Range    Glucose 80 70 - 99 mg/dL    Sodium 139 136 - 145 mmol/L    Potassium 3.8 3.5 - 5.1 mmol/L    Chloride 106 98 - 112 mmol/L    CO2 28.0 21.0 - 32.0 mmol/L    Anion Gap 5 0 - 18 mmol/L    BUN 13 9 - 23 mg/dL    Creatinine 0.70 0.55 - 1.02 mg/dL    Calcium, Total 9.8 8.5 - 10.1 mg/dL    Calculated Osmolality 287 275 - 295 mOsm/kg    eGFR-Cr 94 >=60 mL/min/1.73m2    AST 36 15 - 37 U/L    ALT 53 13 - 56 U/L    Alkaline Phosphatase 183 (H) 55 - 142 U/L    Bilirubin, Total 0.7 0.1 - 2.0 mg/dL    Total Protein 7.1 6.4 - 8.2 g/dL    Albumin 3.9 3.4 - 5.0 g/dL    Globulin  3.2 2.8 - 4.4 g/dL    A/G Ratio 1.2 1.0 - 2.0    Patient Fasting for CMP? Patient not present    CBC W/ DIFFERENTIAL    Collection Time: 03/20/24 10:28 AM   Result Value Ref Range    WBC 3.5 (L) 4.0 - 11.0 x10(3) uL    RBC 3.78 (L) 3.80 - 5.30 x10(6)uL    HGB 13.1 12.0 - 16.0 g/dL    HCT 37.8 35.0 - 48.0 %    .0 150.0 - 450.0 10(3)uL    .0 80.0 - 100.0 fL    MCH 34.7 (H) 26.0 - 34.0 pg    MCHC 34.7 31.0 - 37.0 g/dL    RDW 13.0 %    Neutrophil Absolute Prelim 1.58 1.50 - 7.70 x10 (3) uL    Neutrophil Absolute 1.58 1.50 - 7.70 x10(3) uL    Lymphocyte Absolute 1.29 1.00 - 4.00 x10(3) uL    Monocyte Absolute 0.52 0.10 - 1.00 x10(3) uL    Eosinophil Absolute 0.05 0.00 - 0.70 x10(3) uL    Basophil Absolute 0.05 0.00 - 0.20 x10(3) uL    Immature Granulocyte Absolute 0.01 0.00 - 1.00 x10(3) uL    Neutrophil % 45.1 %    Lymphocyte % 36.9 %    Monocyte % 14.9 %    Eosinophil % 1.4 %    Basophil % 1.4 %    Immature Granulocyte % 0.3 %         Lab Results   Component Value Date     59.1 (H) 02/23/2024     65.6 (H) 01/26/2024     75.1 (H)  09/13/2023       ASSESSMENT AND PLAN:     # Metastatic breast carcinoma: Diag 9/23, bone only disease.  Bx proven involvement T spine.  ER/[place] +, Her 2 neg.     Letrozole started 9/23.  Abemaciclib started 10/23.  Tumor markers improving.  PET scan 3/24 shows radiologic improvement although she still has active disease in spine.  Continue current regimen for now.    # Diarrhea: Due to abemaciclib.  She held it for a week and symptoms are better but she is motivated to try full dose again.  If it is too much, we will have her reduce to 100 mg twice daily.    # Bone metastasis: Started zoledronic acid 10/23, Denosumab not covered.  Continue monthly for now.    # Severe back pain: Much better.  RT completed.  Only using OTC Advil.    # Abnormal LFTs: No liver metastasis on imaging.  Normalized.    # Headache: MRI brain 12/23 negative.    # Neutropenia: Due to abemaciclib.  Stable, better due to holding last week.      ORDERS PLACED:    Return in about 4 weeks (around 4/17/2024) for Labs, MD visit, Infusion.    Michelle Reinoso M.D.    [facility]    [facility]  [address] [place], [place], [address]    3/20/2024

## 2024-03-20 ENCOUNTER — OFFICE VISIT (OUTPATIENT)
Dept: HEMATOLOGY/ONCOLOGY | Facility: HOSPITAL | Age: 69
End: 2024-03-20
Attending: INTERNAL MEDICINE
Payer: MEDICARE

## 2024-03-20 VITALS
RESPIRATION RATE: 18 BRPM | TEMPERATURE: 98 F | DIASTOLIC BLOOD PRESSURE: 102 MMHG | HEART RATE: 70 BPM | SYSTOLIC BLOOD PRESSURE: 183 MMHG | WEIGHT: 115.5 LBS | HEIGHT: 62.01 IN | BODY MASS INDEX: 20.99 KG/M2 | OXYGEN SATURATION: 99 %

## 2024-03-20 DIAGNOSIS — C50.919 CARCINOMA OF BREAST METASTATIC TO MULTIPLE SITES, UNSPECIFIED LATERALITY (HCC): Primary | ICD-10-CM

## 2024-03-20 DIAGNOSIS — G89.3 PAIN FROM BONE METASTASES (HCC): ICD-10-CM

## 2024-03-20 DIAGNOSIS — T50.905D ADVERSE EFFECT OF DRUG, SUBSEQUENT ENCOUNTER: Primary | ICD-10-CM

## 2024-03-20 DIAGNOSIS — C50.912 CARCINOMA OF LEFT BREAST METASTATIC TO MULTIPLE SITES (HCC): ICD-10-CM

## 2024-03-20 DIAGNOSIS — C50.919 CARCINOMA OF BREAST METASTATIC TO MULTIPLE SITES, UNSPECIFIED LATERALITY (HCC): ICD-10-CM

## 2024-03-20 DIAGNOSIS — D70.2 NEUTROPENIA, DRUG-INDUCED (HCC): ICD-10-CM

## 2024-03-20 DIAGNOSIS — R79.89 ABNORMAL LFTS: ICD-10-CM

## 2024-03-20 DIAGNOSIS — C79.51 PAIN FROM BONE METASTASES (HCC): ICD-10-CM

## 2024-03-20 LAB
ALBUMIN SERPL-MCNC: 3.9 G/DL (ref 3.4–5)
ALBUMIN/GLOB SERPL: 1.2 {RATIO} (ref 1–2)
ALP LIVER SERPL-CCNC: 183 U/L
ALT SERPL-CCNC: 53 U/L
ANION GAP SERPL CALC-SCNC: 5 MMOL/L (ref 0–18)
AST SERPL-CCNC: 36 U/L (ref 15–37)
BASOPHILS # BLD AUTO: 0.05 X10(3) UL (ref 0–0.2)
BASOPHILS NFR BLD AUTO: 1.4 %
BILIRUB SERPL-MCNC: 0.7 MG/DL (ref 0.1–2)
BRCA1 C.185DELAG BLD/T QL: 77.7 U/ML (ref ?–38)
BUN BLD-MCNC: 13 MG/DL (ref 9–23)
CALCIUM BLD-MCNC: 9.8 MG/DL (ref 8.5–10.1)
CANCER AG15-3 SERPL-ACNC: 65.1 U/ML (ref ?–35)
CHLORIDE SERPL-SCNC: 106 MMOL/L (ref 98–112)
CO2 SERPL-SCNC: 28 MMOL/L (ref 21–32)
CREAT BLD-MCNC: 0.7 MG/DL
EGFRCR SERPLBLD CKD-EPI 2021: 94 ML/MIN/1.73M2 (ref 60–?)
EOSINOPHIL # BLD AUTO: 0.05 X10(3) UL (ref 0–0.7)
EOSINOPHIL NFR BLD AUTO: 1.4 %
ERYTHROCYTE [DISTWIDTH] IN BLOOD BY AUTOMATED COUNT: 13 %
GLOBULIN PLAS-MCNC: 3.2 G/DL (ref 2.8–4.4)
GLUCOSE BLD-MCNC: 80 MG/DL (ref 70–99)
HCT VFR BLD AUTO: 37.8 %
HGB BLD-MCNC: 13.1 G/DL
IMM GRANULOCYTES # BLD AUTO: 0.01 X10(3) UL (ref 0–1)
IMM GRANULOCYTES NFR BLD: 0.3 %
LYMPHOCYTES # BLD AUTO: 1.29 X10(3) UL (ref 1–4)
LYMPHOCYTES NFR BLD AUTO: 36.9 %
MCH RBC QN AUTO: 34.7 PG (ref 26–34)
MCHC RBC AUTO-ENTMCNC: 34.7 G/DL (ref 31–37)
MCV RBC AUTO: 100 FL
MONOCYTES # BLD AUTO: 0.52 X10(3) UL (ref 0.1–1)
MONOCYTES NFR BLD AUTO: 14.9 %
NEUTROPHILS # BLD AUTO: 1.58 X10 (3) UL (ref 1.5–7.7)
NEUTROPHILS # BLD AUTO: 1.58 X10(3) UL (ref 1.5–7.7)
NEUTROPHILS NFR BLD AUTO: 45.1 %
OSMOLALITY SERPL CALC.SUM OF ELEC: 287 MOSM/KG (ref 275–295)
PLATELET # BLD AUTO: 156 10(3)UL (ref 150–450)
POTASSIUM SERPL-SCNC: 3.8 MMOL/L (ref 3.5–5.1)
PROT SERPL-MCNC: 7.1 G/DL (ref 6.4–8.2)
RBC # BLD AUTO: 3.78 X10(6)UL
SODIUM SERPL-SCNC: 139 MMOL/L (ref 136–145)
WBC # BLD AUTO: 3.5 X10(3) UL (ref 4–11)

## 2024-03-20 PROCEDURE — 99215 OFFICE O/P EST HI 40 MIN: CPT | Performed by: INTERNAL MEDICINE

## 2024-03-20 PROCEDURE — 96374 THER/PROPH/DIAG INJ IV PUSH: CPT

## 2024-03-20 NOTE — PROGRESS NOTES
Education Record    Learner:  Patient/ spouse    Disease / Diagnosis:met breast cancer   Letrozole/ Verzenio/ zometa    Barriers / Limitations:  None   Comments:    Method:  Discussion   Comments:    General Topics:  Plan of care reviewed   Comments:    Outcome:  Shows understanding   Comments:   Verzenio on hold since 3/12/24.   Reports her stomach issues are gone without taking verzenio. Appetite is better,   No diarrhea ( was only twice per week) will do whatever is recommended, can tolerate GI symptoms.   Reporting hair loss continues, has been wearing a topper, next step will be a wig.  Working as , 10 hour days, tired after that.

## 2024-03-21 PROBLEM — J06.9 VIRAL UPPER RESPIRATORY TRACT INFECTION: Status: RESOLVED | Noted: 2021-06-10 | Resolved: 2024-03-21

## 2024-04-18 NOTE — PROGRESS NOTES
[facility] Visit [facility]    Patient Name: Marija Elaine   YOB: 1955   Medical Record Number: [id number]   Date of visit: 4/19/2024     Chief Complaint/Reason for Visit:  Chief Complaint   Patient presents with    Follow - Up    Infusion      Oncologic history:     H/o L breast ca 2006 s/p L lumpectomy, RT, tamoxifen x 5 years   Bone metastasis 9/23.  Biopsy T-spine 9/23-metastatic breast carcinoma.  Letrozole 9/23-  Abemaciclib 10/23-  RT 10/23-12/23    History of Present Illness:     Marija Elaine is a 69 year old patient of Dr. Reinoso with the above oncologic history who is being treated with abemaciclib + letrozole + zoledronic acid for MBC. Presents today for follow up.     She has had significant hair thinning, but thinks the rate of hair loss has slowed.  Diarrhea has been very manageable, now occurring only once every 3-4 weeks and promptly resolves with imodium. She is still working full time as a long-term substitute.     Reviewed available chart notes, labs, and imaging.    History:     Past medical, surgical, social and family history reviewed with the patient and updated as appropriate in the chart.    Allergies:  Allergies   Allergen Reactions    Levofloxacin HIVES     Levaquin         Medications:    Current Outpatient Medications:     metoprolol succinate ER 25 MG Oral Tablet 24 Hr, Take 1 tablet (25 mg total) by mouth daily., Disp: , Rfl:     letrozole 2.5 MG Oral Tab, Take 1 tablet (2.5 mg total) by mouth daily., Disp: 30 tablet, Rfl: 3    Abemaciclib 150 MG Oral Tab, Take 1 tablet (150 mg total) by mouth 2 (two) times daily. may take with or without food, Disp: 60 tablet, Rfl: 6    Multiple Vitamin Oral Tab, Take 1 tablet by mouth every other day., Disp: , Rfl:     Ibuprofen 200 MG Oral Cap, Take by mouth as needed., Disp: , Rfl:     valsartan 160 MG Oral Tab, Take 1 tablet (160 mg total) by mouth daily., Disp: , Rfl:     HYDROcodone-acetaminophen  MG Oral Tab, , Disp:  , Rfl:     ALPRAZolam 0.25 MG Oral Tab, Take 1 tablet (0.25 mg total) by mouth As Directed., Disp: , Rfl:     Review of Systems:  A comprehensive 14 point review of systems was completed.  Pertinent positives and negatives noted in the HPI.    Performance Status: ECOG 0    Vital Signs:  /82 (BP Location: Right arm, Patient Position: Sitting, Cuff Size: adult)   Pulse 72   Temp 98.1 °F (36.7 °C) (Temporal)   Resp 18   Ht 1.575 m (5' 2.01\")   Wt 53.9 kg (118 lb 12.8 oz)   SpO2 98%   BMI 21.72 kg/m²     Physical Examination:  General: Well developed, casually dressed, appropriately groomed, alert and oriented x 3, not in acute distress.  HEENT: Anicteric, conjunctivae and sclerae clear  Chest: Clear to auscultation, respirations unlabored.  Heart: Regular rate and rhythm, normal S1/2. No murmur.   Abdomen: Non-distended  Extremities: No edema.  Neurological: Grossly intact.   Skin: Warm, Dry, No erythema, No rash  Psych/Depression: mood and affect are appropriate.     Labs:     Recent Results (from the past 72 hour(s))   Comp Metabolic Panel (14)    Collection Time: 04/19/24 10:43 AM   Result Value Ref Range    Glucose 103 (H) 70 - 99 mg/dL    Sodium 139 136 - 145 mmol/L    Potassium 4.2 3.5 - 5.1 mmol/L    Chloride 107 98 - 112 mmol/L    CO2 26.0 21.0 - 32.0 mmol/L    Anion Gap 6 0 - 18 mmol/L    BUN 15 9 - 23 mg/dL    Creatinine 0.96 0.55 - 1.02 mg/dL    Calcium, Total 9.2 8.5 - 10.1 mg/dL    Calculated Osmolality 289 275 - 295 mOsm/kg    eGFR-Cr 64 >=60 mL/min/1.73m2    AST 36 15 - 37 U/L    ALT 43 13 - 56 U/L    Alkaline Phosphatase 188 (H) 55 - 142 U/L    Bilirubin, Total 0.6 0.1 - 2.0 mg/dL    Total Protein 7.0 6.4 - 8.2 g/dL    Albumin 3.6 3.4 - 5.0 g/dL    Globulin  3.4 2.8 - 4.4 g/dL    A/G Ratio 1.1 1.0 - 2.0    Patient Fasting for CMP? Patient not present    CA 27.29 [E]    Collection Time: 04/19/24 10:43 AM   Result Value Ref Range    Breast Carcinoma Ag [id number] 74.8 (H) <38.0 u/mL   CANCER  ANTIGEN (CA) 15-3 [E]    Collection Time: 04/19/24 10:43 AM   Result Value Ref Range    Cancer Ag 15-3 (CA 15-3) 64.2 (H) <=35.0 U/mL   CBC W/ DIFFERENTIAL    Collection Time: 04/19/24 10:43 AM   Result Value Ref Range    WBC 3.4 (L) 4.0 - 11.0 x10(3) uL    RBC 3.67 (L) 3.80 - 5.30 x10(6)uL    HGB 12.6 12.0 - 16.0 g/dL    HCT 36.2 35.0 - 48.0 %    .0 150.0 - 450.0 10(3)uL    MCV 98.6 80.0 - 100.0 fL    MCH 34.3 (H) 26.0 - 34.0 pg    MCHC 34.8 31.0 - 37.0 g/dL    RDW 12.5 %    Neutrophil Absolute Prelim 1.69 1.50 - 7.70 x10 (3) uL    Neutrophil Absolute 1.69 1.50 - 7.70 x10(3) uL    Lymphocyte Absolute 1.28 1.00 - 4.00 x10(3) uL    Monocyte Absolute 0.32 0.10 - 1.00 x10(3) uL    Eosinophil Absolute 0.06 0.00 - 0.70 x10(3) uL    Basophil Absolute 0.06 0.00 - 0.20 x10(3) uL    Immature Granulocyte Absolute 0.01 0.00 - 1.00 x10(3) uL    Neutrophil % 49.3 %    Lymphocyte % 37.4 %    Monocyte % 9.4 %    Eosinophil % 1.8 %    Basophil % 1.8 %    Immature Granulocyte % 0.3 %       Impression/Plan    Metastatic breast cancer: ER/ID+, Her2 neg. Receiving letrozole + abemaciclib with good response (downtrending tumor markers, improvement on PET). Tolerating therapy without severe or unexpected side effects. Labs reviewed, continue with therapy without modification.     Diarrhea: due to abemaciclib, mild and requires no dose adjustments at this time    Bone metastasis: on monthly zoledronic acid     Neutropenia: due to abemaciclib, mild    Hair thinning: has slowed, does not feel like trying alternative AI would be worthwhile at this time.     Emotional Well Being:  I have assessed the patient's emotional well-being and any concerns about anxiety or depression.  We discussed issues of distress, coping difficulties and social support systems and currently there are no active problems.    Planned Follow Up: 4 weeks for MD + lab + infusion    Risk Level: HIGH - metastatic breast cancer receiving systemic therapy requiring  close monitoring     The 21st Century Cures Act makes medical notes like these available to patients in the interest of transparency. Please be advised this is a medical document. Medical documents are intended to carry relevant information, facts as evident, and the clinical opinion of the practitioner. The medical note is intended as peer to peer communication and may appear blunt or direct. It is written in medical language and may contain abbreviations or verbiage that are unfamiliar.     Electronically Signed by:    Tennille Castelan DNP, NP-C  Nurse Practitioner  [facility]

## 2024-04-19 ENCOUNTER — OFFICE VISIT (OUTPATIENT)
Dept: HEMATOLOGY/ONCOLOGY | Facility: HOSPITAL | Age: 69
End: 2024-04-19
Attending: INTERNAL MEDICINE
Payer: MEDICARE

## 2024-04-19 VITALS
RESPIRATION RATE: 18 BRPM | SYSTOLIC BLOOD PRESSURE: 149 MMHG | OXYGEN SATURATION: 98 % | WEIGHT: 118.81 LBS | BODY MASS INDEX: 21.59 KG/M2 | HEIGHT: 62.01 IN | DIASTOLIC BLOOD PRESSURE: 82 MMHG | HEART RATE: 72 BPM | TEMPERATURE: 98 F

## 2024-04-19 DIAGNOSIS — T45.1X5A CHEMOTHERAPY INDUCED NEUTROPENIA (HCC): ICD-10-CM

## 2024-04-19 DIAGNOSIS — G89.3 PAIN FROM BONE METASTASES (HCC): ICD-10-CM

## 2024-04-19 DIAGNOSIS — K52.1 DIARRHEA DUE TO DRUG: ICD-10-CM

## 2024-04-19 DIAGNOSIS — C50.919 CARCINOMA OF BREAST METASTATIC TO MULTIPLE SITES, UNSPECIFIED LATERALITY (HCC): ICD-10-CM

## 2024-04-19 DIAGNOSIS — L65.9 HAIR THINNING: ICD-10-CM

## 2024-04-19 DIAGNOSIS — C79.51 CANCER, METASTATIC TO BONE (HCC): ICD-10-CM

## 2024-04-19 DIAGNOSIS — C79.51 PAIN FROM BONE METASTASES (HCC): ICD-10-CM

## 2024-04-19 DIAGNOSIS — C50.919 CARCINOMA OF BREAST METASTATIC TO MULTIPLE SITES, UNSPECIFIED LATERALITY (HCC): Primary | ICD-10-CM

## 2024-04-19 DIAGNOSIS — D70.1 CHEMOTHERAPY INDUCED NEUTROPENIA (HCC): ICD-10-CM

## 2024-04-19 DIAGNOSIS — C50.912 CARCINOMA OF LEFT BREAST METASTATIC TO MULTIPLE SITES (HCC): Primary | ICD-10-CM

## 2024-04-19 LAB
ALBUMIN SERPL-MCNC: 3.6 G/DL (ref 3.4–5)
ALBUMIN/GLOB SERPL: 1.1 {RATIO} (ref 1–2)
ALP LIVER SERPL-CCNC: 188 U/L
ALT SERPL-CCNC: 43 U/L
ANION GAP SERPL CALC-SCNC: 6 MMOL/L (ref 0–18)
AST SERPL-CCNC: 36 U/L (ref 15–37)
BASOPHILS # BLD AUTO: 0.06 X10(3) UL (ref 0–0.2)
BASOPHILS NFR BLD AUTO: 1.8 %
BILIRUB SERPL-MCNC: 0.6 MG/DL (ref 0.1–2)
BRCA1 C.185DELAG BLD/T QL: 74.8 U/ML (ref ?–38)
BUN BLD-MCNC: 15 MG/DL (ref 9–23)
CALCIUM BLD-MCNC: 9.2 MG/DL (ref 8.5–10.1)
CANCER AG15-3 SERPL-ACNC: 64.2 U/ML (ref ?–35)
CHLORIDE SERPL-SCNC: 107 MMOL/L (ref 98–112)
CO2 SERPL-SCNC: 26 MMOL/L (ref 21–32)
CREAT BLD-MCNC: 0.96 MG/DL
EGFRCR SERPLBLD CKD-EPI 2021: 64 ML/MIN/1.73M2 (ref 60–?)
EOSINOPHIL # BLD AUTO: 0.06 X10(3) UL (ref 0–0.7)
EOSINOPHIL NFR BLD AUTO: 1.8 %
ERYTHROCYTE [DISTWIDTH] IN BLOOD BY AUTOMATED COUNT: 12.5 %
GLOBULIN PLAS-MCNC: 3.4 G/DL (ref 2.8–4.4)
GLUCOSE BLD-MCNC: 103 MG/DL (ref 70–99)
HCT VFR BLD AUTO: 36.2 %
HGB BLD-MCNC: 12.6 G/DL
IMM GRANULOCYTES # BLD AUTO: 0.01 X10(3) UL (ref 0–1)
IMM GRANULOCYTES NFR BLD: 0.3 %
LYMPHOCYTES # BLD AUTO: 1.28 X10(3) UL (ref 1–4)
LYMPHOCYTES NFR BLD AUTO: 37.4 %
MCH RBC QN AUTO: 34.3 PG (ref 26–34)
MCHC RBC AUTO-ENTMCNC: 34.8 G/DL (ref 31–37)
MCV RBC AUTO: 98.6 FL
MONOCYTES # BLD AUTO: 0.32 X10(3) UL (ref 0.1–1)
MONOCYTES NFR BLD AUTO: 9.4 %
NEUTROPHILS # BLD AUTO: 1.69 X10 (3) UL (ref 1.5–7.7)
NEUTROPHILS # BLD AUTO: 1.69 X10(3) UL (ref 1.5–7.7)
NEUTROPHILS NFR BLD AUTO: 49.3 %
OSMOLALITY SERPL CALC.SUM OF ELEC: 289 MOSM/KG (ref 275–295)
PLATELET # BLD AUTO: 156 10(3)UL (ref 150–450)
POTASSIUM SERPL-SCNC: 4.2 MMOL/L (ref 3.5–5.1)
PROT SERPL-MCNC: 7 G/DL (ref 6.4–8.2)
RBC # BLD AUTO: 3.67 X10(6)UL
SODIUM SERPL-SCNC: 139 MMOL/L (ref 136–145)
WBC # BLD AUTO: 3.4 X10(3) UL (ref 4–11)

## 2024-04-19 PROCEDURE — 99215 OFFICE O/P EST HI 40 MIN: CPT | Performed by: NURSE PRACTITIONER

## 2024-04-19 PROCEDURE — 96374 THER/PROPH/DIAG INJ IV PUSH: CPT

## 2024-04-19 RX ORDER — METOPROLOL SUCCINATE 25 MG/1
25 TABLET, EXTENDED RELEASE ORAL DAILY
COMMUNITY
Start: 2024-04-15

## 2024-04-19 NOTE — PROGRESS NOTES
Pt here for Zometa . Pt denies any issues or concerns.      Ordering MD: Dr. Reinoso  Order Exp: 10/5/24     Pt tolerated infusion without difficulty or complaint. Reviewed next apt date/time: Yes -      Education Record  Learner:  Patient  Disease / Diagnosis: Breast Ca with bone metastasis  Barriers / Limitations:  None  Method:  Brief focused and Reinforcement  General Topics:  Medication, Plan of care reviewed, and Fall risk and prevention  Outcome:  Shows understanding

## 2024-04-19 NOTE — PROGRESS NOTES
Chief Complaint   Patient presents with    Follow - Up    Infusion     Pt is here for treatment - maintenance zometa/also treated with oral verzenio. Appetite has been ok; denies N,V,C. Intermediate/mild diarrhea every 3 weeks or so, manageable. Has been working FT as a  since mid January; occasional fatigue.    Education Record    Learner:  Patient    Disease / Diagnosis: breast cancer    Barriers / Limitations:  None   Comments:    Method:  Brief focused   Comments:    General Topics:  Diet, Medication, Pain, Side effects and symptom management, and Plan of care reviewed   Comments:    Outcome:  Shows understanding   Comments:

## 2024-05-02 DIAGNOSIS — C50.919 CARCINOMA OF BREAST METASTATIC TO MULTIPLE SITES, UNSPECIFIED LATERALITY (HCC): Primary | ICD-10-CM

## 2024-05-02 DIAGNOSIS — C50.912 CARCINOMA OF LEFT BREAST METASTATIC TO MULTIPLE SITES (HCC): ICD-10-CM

## 2024-05-02 RX ORDER — LETROZOLE 2.5 MG/1
2.5 TABLET, FILM COATED ORAL DAILY
Qty: 30 TABLET | Refills: 3 | Status: SHIPPED | OUTPATIENT
Start: 2024-05-02

## 2024-05-16 NOTE — PROGRESS NOTES
[facility] Progress Note      Patient Name:  Marija Elaine  YOB: 1955  Medical Record Number:  [id number]    Date of visit:  5/17/2024      CHIEF COMPLAINT: Metastatic breast carcinoma.    ONCOLOGY HISTORY:    Bone metastasis 9/23.  Biopsy T-spine 9/23-metastatic breast carcinoma.  Letrozole 9/23-  Abemaciclib 10/23-  RT 10/23-12/23    HPI:     69 year old female that I have followed since 9/23 after w/u of back pain and bone scan 9/23-multiple skeletal metastasis.  MRI C-spine showed metastatic disease at C6 with epidural tumor.  H/o L breast ca 2006 s/p L lumpectomy, RT, tamoxifen x 5 years.    Letrozole started 9/23.  MRI T-spine 9/23-diffuse osseous metastatic disease, most pronounced T2-T5.  No cord compression or pathologic fracture.  Mild epidural extension of tumor along T4, T5, T8.  PET scan 9/23-multiple areas of osseous metastatic disease.    CT biopsy T-spine 9/23-metastatic breast carcinoma, 50% ER, 25% WA, HER2 2+ by IHC, FISH pending.  Ki-67 10%.  Abemaciclib started 10/23.  Does have diarrhea with it.  More hair thinning.  Recent restless legs.  Mild swelling left leg.  Some pain in her right scapula.    SOCIAL HISTORY:    , lives in [place], .  2 sons in [place].  Older is  for spinal cord stimulator implants.  He has a 4-1/2-year-old child.  Younger son has a 1-1/2-year-old child.    ROS:     Constitutional: no fever, chills fatigue,night sweats or weight loss  Neurologic: no headache, seizures, diplopia or weakness  Respiratory: no cough, SOB or hemoptysis  Cardiovascular: no chest pain, ankle swelling, RIOS  GI: no nausea, vomiting, diarrhea or BRBPR  Musculoskeletal: no body-ache or joint pain  Dermatologic: no alopecia, rash, pruritis  : no hematuria, dysuria or frequency  Psych: no confusion or depression   Heme: no easy bruising or bleeding     ALLERGIES:    Allergies   Allergen Reactions    Levofloxacin HIVES      Levaquin         MEDICATIONS:    Current Outpatient Medications   Medication Sig Dispense Refill    ergocalciferol 1.25 MG (93858 UT) Oral Cap Take 1 capsule (50,000 Units total) by mouth once a week for 12 doses. 12 capsule 0    letrozole 2.5 MG Oral Tab Take 1 tablet (2.5 mg total) by mouth daily. 30 tablet 3    metoprolol succinate ER 25 MG Oral Tablet 24 Hr Take 1 tablet (25 mg total) by mouth daily.      Abemaciclib 150 MG Oral Tab Take 1 tablet (150 mg total) by mouth 2 (two) times daily. may take with or without food 60 tablet 6    Multiple Vitamin Oral Tab Take 1 tablet by mouth every other day.      ALPRAZolam 0.25 MG Oral Tab Take 1 tablet (0.25 mg total) by mouth As Directed.      Ibuprofen 200 MG Oral Cap Take by mouth as needed.      valsartan 160 MG Oral Tab Take 1 tablet (160 mg total) by mouth daily.      HYDROcodone-acetaminophen  MG Oral Tab  (Patient not taking: Reported on 2024)         VITALS:  Height: 157.5 cm (5' 2.01\") (1044)  Weight: 53.4 kg (117 lb 12.8 oz) (1044)  BSA (Calculated - sq m): 1.53 sq meters (1044)  Pulse: 64 (1044)  BP: 151/80 (1044)  Temp: 97.6 °F (36.4 °C) (1044)  Do Not Use - Resp Rate: --  SpO2: 96 % (1044)    PS:  ECO    PHYSICAL EXAM:    General: alert and oriented x 3, not in acute distress.  Accompanied by .  HEENT: CAROL, oropharynx  clear.  Neck: supple.  No JVD /adenopathy.  CVS: S1S2, regular  Rhythm, no murmurs.   Lungs: Clear to auscultation and percussion.  Abdomen:   Extremities:  No edema.  CNS: no focal deficit    Emotional well being: Patient's emotional well being was assessed.  No issues requiring acute psychosocial intervention were identified.     IMAGING:    LABS:     Recent Results (from the past 24 hour(s))   Comp Metabolic Panel (14)    Collection Time: 24 10:36 AM   Result Value Ref Range    Glucose 115 (H) 70 - 99 mg/dL    Sodium 139 136 - 145 mmol/L    Potassium 3.8 3.5 - 5.1  mmol/L    Chloride 107 98 - 112 mmol/L    CO2 25.0 21.0 - 32.0 mmol/L    Anion Gap 7 0 - 18 mmol/L    BUN 14 9 - 23 mg/dL    Creatinine 0.94 0.55 - 1.02 mg/dL    Calcium, Total 9.4 8.5 - 10.1 mg/dL    Calculated Osmolality 289 275 - 295 mOsm/kg    eGFR-Cr 65 >=60 mL/min/1.73m2    AST 52 (H) 15 - 37 U/L    ALT 58 (H) 13 - 56 U/L    Alkaline Phosphatase 184 (H) 55 - 142 U/L    Bilirubin, Total 0.8 0.1 - 2.0 mg/dL    Total Protein 7.2 6.4 - 8.2 g/dL    Albumin 3.8 3.4 - 5.0 g/dL    Globulin  3.4 2.8 - 4.4 g/dL    A/G Ratio 1.1 1.0 - 2.0    Patient Fasting for CMP? Patient not present    CA 27.29 [E]    Collection Time: 05/17/24 10:36 AM   Result Value Ref Range    Breast Carcinoma Ag [id number] 78.7 (H) <38.0 u/mL   CANCER ANTIGEN (CA) 15-3 [E]    Collection Time: 05/17/24 10:36 AM   Result Value Ref Range    Cancer Ag 15-3 (CA 15-3) 60.3 (H) <=35.0 U/mL   CBC W/ DIFFERENTIAL    Collection Time: 05/17/24 10:36 AM   Result Value Ref Range    WBC 2.6 (L) 4.0 - 11.0 x10(3) uL    RBC 3.54 (L) 3.80 - 5.30 x10(6)uL    HGB 12.5 12.0 - 16.0 g/dL    HCT 34.8 (L) 35.0 - 48.0 %    .0 (L) 150.0 - 450.0 10(3)uL    MCV 98.3 80.0 - 100.0 fL    MCH 35.3 (H) 26.0 - 34.0 pg    MCHC 35.9 31.0 - 37.0 g/dL    RDW 13.2 %    Neutrophil Absolute Prelim 1.15 (L) 1.50 - 7.70 x10 (3) uL    Neutrophil Absolute 1.15 (L) 1.50 - 7.70 x10(3) uL    Lymphocyte Absolute 1.05 1.00 - 4.00 x10(3) uL    Monocyte Absolute 0.26 0.10 - 1.00 x10(3) uL    Eosinophil Absolute 0.03 0.00 - 0.70 x10(3) uL    Basophil Absolute 0.07 0.00 - 0.20 x10(3) uL    Immature Granulocyte Absolute 0.01 0.00 - 1.00 x10(3) uL    Neutrophil % 44.7 %    Lymphocyte % 40.9 %    Monocyte % 10.1 %    Eosinophil % 1.2 %    Basophil % 2.7 %    Immature Granulocyte % 0.4 %   Ferritin    Collection Time: 05/17/24 10:36 AM   Result Value Ref Range    Ferritin 284.7 18.0 - 340.0 ng/mL   Iron And Tibc    Collection Time: 05/17/24 10:36 AM   Result Value Ref Range    Iron 148 50 - 170 ug/dL     Transferrin 263 200 - 360 mg/dL    Total Iron Binding Capacity 392 240 - 450 ug/dL    % Saturation 38 15 - 50 %   TSH and Free T4    Collection Time: 05/17/24 10:36 AM   Result Value Ref Range    Free T4 0.8 0.8 - 1.7 ng/dL    TSH 1.250 0.358 - 3.740 mIU/mL   Vitamin D, 25-Hydroxy    Collection Time: 05/17/24 10:36 AM   Result Value Ref Range    Vitamin D, 25OH, Total 18.1 (L) 30.0 - 100.0 ng/mL           Lab Results   Component Value Date     60.3 (H) 05/17/2024     64.2 (H) 04/19/2024     65.1 (H) 03/20/2024     59.1 (H) 02/23/2024     65.6 (H) 01/26/2024       ASSESSMENT AND PLAN:     # Metastatic breast carcinoma: Diag 9/23, bone only disease.  Bx proven involvement T spine.  ER/IA +, Her 2 neg.     Letrozole started 9/23.  Abemaciclib started 10/23.  Tumor markers improving.  PET scan 3/24 shows radiologic improvement although she still has active disease in spine.  Continue current regimen for now.    # Diarrhea: Due to abemaciclib.  Consider reducing to 100 mg twice daily.    # Bone metastasis: Started zoledronic acid 10/23, Denosumab not covered.  Continue monthly for now.  Changed to every 2 months from 10/24.    # Severe back pain: RT completed, better.  Recommend repeating MRI as she has new scapular pain on right side.    # Vitamin D deficiency: Replace.    # Abnormal LFTs: No liver metastasis on imaging.  Noted on and off.    # Headache: MRI brain 12/23 negative.    # Neutropenia: Due to abemaciclib.  Stable, better due to holding last week.      ORDERS PLACED:        Procedures    Ferritin    Iron And Tibc    TSH and Free T4    Vitamin D    Vitamin D, 25-Hydroxy    MRI SPINE CERVICAL (W+WO) (CPT=72156)    MRI SPINE THORACIC (W+WO) (CPT=72157)    MRI SPINE LUMBAR (W+WO) (CPT=72158)         Return for Labs, MD visit, Infusion.    Michelle Reinoso M.D.    [facility]    [facility]  [address] [place], [address]    5/17/2024

## 2024-05-17 ENCOUNTER — OFFICE VISIT (OUTPATIENT)
Dept: HEMATOLOGY/ONCOLOGY | Facility: HOSPITAL | Age: 69
End: 2024-05-17
Attending: INTERNAL MEDICINE

## 2024-05-17 VITALS
OXYGEN SATURATION: 96 % | WEIGHT: 117.81 LBS | TEMPERATURE: 98 F | SYSTOLIC BLOOD PRESSURE: 151 MMHG | HEIGHT: 62.01 IN | DIASTOLIC BLOOD PRESSURE: 80 MMHG | BODY MASS INDEX: 21.41 KG/M2 | RESPIRATION RATE: 16 BRPM | HEART RATE: 64 BPM

## 2024-05-17 DIAGNOSIS — M54.9 CHRONIC MIDLINE BACK PAIN, UNSPECIFIED BACK LOCATION: ICD-10-CM

## 2024-05-17 DIAGNOSIS — R53.83 OTHER FATIGUE: Primary | ICD-10-CM

## 2024-05-17 DIAGNOSIS — G89.3 PAIN FROM BONE METASTASES (HCC): ICD-10-CM

## 2024-05-17 DIAGNOSIS — L65.9 HAIR THINNING: ICD-10-CM

## 2024-05-17 DIAGNOSIS — C50.919 CARCINOMA OF BREAST METASTATIC TO MULTIPLE SITES, UNSPECIFIED LATERALITY (HCC): Primary | ICD-10-CM

## 2024-05-17 DIAGNOSIS — K52.1 DIARRHEA DUE TO DRUG: ICD-10-CM

## 2024-05-17 DIAGNOSIS — G89.29 CHRONIC MIDLINE BACK PAIN, UNSPECIFIED BACK LOCATION: ICD-10-CM

## 2024-05-17 DIAGNOSIS — D70.2 NEUTROPENIA, DRUG-INDUCED (HCC): ICD-10-CM

## 2024-05-17 DIAGNOSIS — R79.89 ABNORMAL LFTS: ICD-10-CM

## 2024-05-17 DIAGNOSIS — T50.905D ADVERSE EFFECT OF DRUG, SUBSEQUENT ENCOUNTER: ICD-10-CM

## 2024-05-17 DIAGNOSIS — C79.51 PAIN FROM BONE METASTASES (HCC): ICD-10-CM

## 2024-05-17 DIAGNOSIS — C50.919 CARCINOMA OF BREAST METASTATIC TO MULTIPLE SITES, UNSPECIFIED LATERALITY (HCC): ICD-10-CM

## 2024-05-17 DIAGNOSIS — C79.51 CANCER, METASTATIC TO BONE (HCC): ICD-10-CM

## 2024-05-17 LAB
ALBUMIN SERPL-MCNC: 3.8 G/DL (ref 3.4–5)
ALBUMIN/GLOB SERPL: 1.1 {RATIO} (ref 1–2)
ALP LIVER SERPL-CCNC: 184 U/L
ALT SERPL-CCNC: 58 U/L
ANION GAP SERPL CALC-SCNC: 7 MMOL/L (ref 0–18)
AST SERPL-CCNC: 52 U/L (ref 15–37)
BASOPHILS # BLD AUTO: 0.07 X10(3) UL (ref 0–0.2)
BASOPHILS NFR BLD AUTO: 2.7 %
BILIRUB SERPL-MCNC: 0.8 MG/DL (ref 0.1–2)
BRCA1 C.185DELAG BLD/T QL: 78.7 U/ML (ref ?–38)
BUN BLD-MCNC: 14 MG/DL (ref 9–23)
CALCIUM BLD-MCNC: 9.4 MG/DL (ref 8.5–10.1)
CANCER AG15-3 SERPL-ACNC: 60.3 U/ML (ref ?–35)
CHLORIDE SERPL-SCNC: 107 MMOL/L (ref 98–112)
CO2 SERPL-SCNC: 25 MMOL/L (ref 21–32)
CREAT BLD-MCNC: 0.94 MG/DL
DEPRECATED HBV CORE AB SER IA-ACNC: 284.7 NG/ML
EGFRCR SERPLBLD CKD-EPI 2021: 65 ML/MIN/1.73M2 (ref 60–?)
EOSINOPHIL # BLD AUTO: 0.03 X10(3) UL (ref 0–0.7)
EOSINOPHIL NFR BLD AUTO: 1.2 %
ERYTHROCYTE [DISTWIDTH] IN BLOOD BY AUTOMATED COUNT: 13.2 %
GLOBULIN PLAS-MCNC: 3.4 G/DL (ref 2.8–4.4)
GLUCOSE BLD-MCNC: 115 MG/DL (ref 70–99)
HCT VFR BLD AUTO: 34.8 %
HGB BLD-MCNC: 12.5 G/DL
IMM GRANULOCYTES # BLD AUTO: 0.01 X10(3) UL (ref 0–1)
IMM GRANULOCYTES NFR BLD: 0.4 %
IRON SATN MFR SERPL: 38 %
IRON SERPL-MCNC: 148 UG/DL
LYMPHOCYTES # BLD AUTO: 1.05 X10(3) UL (ref 1–4)
LYMPHOCYTES NFR BLD AUTO: 40.9 %
MCH RBC QN AUTO: 35.3 PG (ref 26–34)
MCHC RBC AUTO-ENTMCNC: 35.9 G/DL (ref 31–37)
MCV RBC AUTO: 98.3 FL
MONOCYTES # BLD AUTO: 0.26 X10(3) UL (ref 0.1–1)
MONOCYTES NFR BLD AUTO: 10.1 %
NEUTROPHILS # BLD AUTO: 1.15 X10 (3) UL (ref 1.5–7.7)
NEUTROPHILS # BLD AUTO: 1.15 X10(3) UL (ref 1.5–7.7)
NEUTROPHILS NFR BLD AUTO: 44.7 %
OSMOLALITY SERPL CALC.SUM OF ELEC: 289 MOSM/KG (ref 275–295)
PLATELET # BLD AUTO: 137 10(3)UL (ref 150–450)
POTASSIUM SERPL-SCNC: 3.8 MMOL/L (ref 3.5–5.1)
PROT SERPL-MCNC: 7.2 G/DL (ref 6.4–8.2)
RBC # BLD AUTO: 3.54 X10(6)UL
SODIUM SERPL-SCNC: 139 MMOL/L (ref 136–145)
T4 FREE SERPL-MCNC: 0.8 NG/DL (ref 0.8–1.7)
TIBC SERPL-MCNC: 392 UG/DL (ref 240–450)
TRANSFERRIN SERPL-MCNC: 263 MG/DL (ref 200–360)
TSI SER-ACNC: 1.25 MIU/ML (ref 0.36–3.74)
VIT D+METAB SERPL-MCNC: 18.1 NG/ML (ref 30–100)
WBC # BLD AUTO: 2.6 X10(3) UL (ref 4–11)

## 2024-05-17 PROCEDURE — 96374 THER/PROPH/DIAG INJ IV PUSH: CPT

## 2024-05-17 PROCEDURE — 99215 OFFICE O/P EST HI 40 MIN: CPT | Performed by: INTERNAL MEDICINE

## 2024-05-17 RX ORDER — ERGOCALCIFEROL 1.25 MG/1
50000 CAPSULE ORAL WEEKLY
Qty: 12 CAPSULE | Refills: 0 | Status: SHIPPED | OUTPATIENT
Start: 2024-05-17 | End: 2024-08-03

## 2024-05-17 NOTE — PROGRESS NOTES
Patient here for f/u and zometa infusion. Patient currently taking letrozole as directed. Patient on Abemaciclib. Patient denies n/v and weekly diarrhea. Patient takes imodium that helps with diarrhea symptoms. Patient c/o hair loss. Patient c/o restless leg syndrome and slight edema in left lower leg and ankle. 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 Patient states she has new back pain located in mid back that radiates to right side shoulder blade area. Patient states she takes Advil with relief. Patient states she has irregular appetite and adequate fluid intake. Patient has no further concerns or complaints at this time.     Education Record    Learner:  Patient and Spouse    Disease / Diagnosis: malignant neoplasm of breast    Barriers / Limitations:  None   Comments:    Method:  Discussion   Comments:    General Topics:  Medication, Pain, Side effects and symptom management, and Plan of care reviewed   Comments:    Outcome:  Shows understanding   Comments:

## 2024-05-17 NOTE — PROGRESS NOTES
Pt here for Zometa . Pt denies any issues or concerns.      Ordering MD: Dr. Reinoso  Order Exp: 10/5/24     Pt tolerated infusion without difficulty or complaint. Reviewed next apt date/time: Yes - 6/14 for PL/DR. Reinoso/Kym      Education Record  Learner:  Patient  Disease / Diagnosis: Breast Ca with bone mets  Barriers / Limitations:  None  Method:  Brief focused and Reinforcement  General Topics:  Medication, Side effects and symptom management, Plan of care reviewed, and Fall risk and prevention  Outcome:  Shows understanding

## 2024-06-07 DIAGNOSIS — C50.919 CARCINOMA OF BREAST METASTATIC TO MULTIPLE SITES, UNSPECIFIED LATERALITY (HCC): Primary | ICD-10-CM

## 2024-06-07 DIAGNOSIS — C79.51 CANCER, METASTATIC TO BONE (HCC): ICD-10-CM

## 2024-06-13 NOTE — PROGRESS NOTES
[facility] Progress Note      Patient Name:  Marija Elaine  YOB: 1955  Medical Record Number:  [id number]    Date of visit:  6/14/2024    CHIEF COMPLAINT: Metastatic breast carcinoma.    ONCOLOGY HISTORY:    Bone metastasis 9/23.  Biopsy T-spine 9/23-metastatic breast carcinoma.  Letrozole 9/23-  Abemaciclib 10/23-  RT 10/23-12/23    HPI:     69 year old female that I have followed since 9/23 after w/u of back pain and bone scan 9/23-multiple skeletal metastasis.  MRI C-spine showed metastatic disease at C6 with epidural tumor.  H/o L breast ca 2006 s/p L lumpectomy, RT, tamoxifen x 5 years.    Letrozole started 9/23.  MRI T-spine 9/23-diffuse osseous metastatic disease, most pronounced T2-T5.  No cord compression or pathologic fracture.  Mild epidural extension of tumor along T4, T5, T8.  PET scan 9/23-multiple areas of osseous metastatic disease.    CT biopsy T-spine 9/23-metastatic breast carcinoma, 50% ER, 25% MD, HER2 2+ by IHC, FISH pending.  Ki-67 10%.  Abemaciclib started 10/23.  Does have diarrhea with it.  More hair thinning.  Energy level is okay.      SOCIAL HISTORY:    , lives in [place], .  2 sons in [place].  Older is  for spinal cord stimulator implants.  He has a 4-1/2-year-old child.  Younger son has a 1-1/2-year-old child.    ROS:     Constitutional: no fever, chills fatigue,night sweats or weight loss  Neurologic: no headache, seizures, diplopia or weakness  Respiratory: no cough, SOB or hemoptysis  Cardiovascular: no chest pain, ankle swelling, RIOS  GI: no nausea, vomiting, diarrhea or BRBPR  Musculoskeletal: no body-ache or joint pain  Dermatologic: no alopecia, rash, pruritis  : no hematuria, dysuria or frequency  Psych: no confusion or depression   Heme: no easy bruising or bleeding     ALLERGIES:    Allergies   Allergen Reactions    Levofloxacin HIVES     Levaquin         MEDICATIONS:    Current Outpatient  Medications   Medication Sig Dispense Refill    Abemaciclib 150 MG Oral Tab Take 1 tablet (150 mg total) by mouth 2 (two) times daily. may take with or without food 60 tablet 6    ergocalciferol 1.25 MG (26635 UT) Oral Cap Take 1 capsule (50,000 Units total) by mouth once a week for 12 doses. 12 capsule 0    letrozole 2.5 MG Oral Tab Take 1 tablet (2.5 mg total) by mouth daily. 30 tablet 3    metoprolol succinate ER 25 MG Oral Tablet 24 Hr Take 1 tablet (25 mg total) by mouth daily.      HYDROcodone-acetaminophen  MG Oral Tab       Multiple Vitamin Oral Tab Take 1 tablet by mouth every other day.      ALPRAZolam 0.25 MG Oral Tab Take 1 tablet (0.25 mg total) by mouth As Directed.      Ibuprofen 200 MG Oral Cap Take by mouth as needed.      valsartan 160 MG Oral Tab Take 1 tablet (160 mg total) by mouth daily.         VITALS:  Height: 157.5 cm (5' 2.01\") ( 114)  Weight: 52.8 kg (116 lb 6.4 oz) (1145)  BSA (Calculated - sq m): 1.52 sq meters (1145)  Pulse: 69 (1145)  BP: 185/85 (1145)  Temp: 97.9 °F (36.6 °C) (1145)  Do Not Use - Resp Rate: --  SpO2: 99 % (1145)    PS:  ECO    PHYSICAL EXAM:    General: alert and oriented x 3, not in acute distress.  Accompanied by daughter-in-law.  HEENT: CAROL, oropharynx  clear.  Neck: supple.  No JVD /adenopathy.  CVS: S1S2, regular  Rhythm, no murmurs.   Lungs: Clear to auscultation and percussion.  Abdomen:   Extremities:  No edema.  CNS: no focal deficit    Emotional well being: Patient's emotional well being was assessed.  No issues requiring acute psychosocial intervention were identified.     IMAGING:    LABS:     Recent Results (from the past 24 hour(s))   Comp Metabolic Panel (14)    Collection Time: 24 11:39 AM   Result Value Ref Range    Glucose 90 70 - 99 mg/dL    Sodium 140 136 - 145 mmol/L    Potassium 4.3 3.5 - 5.1 mmol/L    Chloride 111 98 - 112 mmol/L    CO2 26.0 21.0 - 32.0 mmol/L    Anion Gap 3 0 - 18  mmol/L    BUN 17 9 - 23 mg/dL    Creatinine 1.09 (H) 0.55 - 1.02 mg/dL    Calcium, Total 9.6 8.5 - 10.1 mg/dL    Calculated Osmolality 291 275 - 295 mOsm/kg    eGFR-Cr 55 (L) >=60 mL/min/1.73m2    AST 45 (H) 15 - 37 U/L    ALT 67 (H) 13 - 56 U/L    Alkaline Phosphatase 181 (H) 55 - 142 U/L    Bilirubin, Total 0.7 0.1 - 2.0 mg/dL    Total Protein 7.2 6.4 - 8.2 g/dL    Albumin 4.0 3.4 - 5.0 g/dL    Globulin  3.2 2.8 - 4.4 g/dL    A/G Ratio 1.3 1.0 - 2.0    Patient Fasting for CMP? No    CA 27.29 [E]    Collection Time: 06/14/24 11:39 AM   Result Value Ref Range    Breast Carcinoma Ag [id number] 70.3 (H) <38.0 u/mL   CANCER ANTIGEN (CA) 15-3 [E]    Collection Time: 06/14/24 11:39 AM   Result Value Ref Range    Cancer Ag 15-3 (CA 15-3) 55.6 (H) <=35.0 U/mL   CBC W/ DIFFERENTIAL    Collection Time: 06/14/24 11:39 AM   Result Value Ref Range    WBC 3.4 (L) 4.0 - 11.0 x10(3) uL    RBC 3.64 (L) 3.80 - 5.30 x10(6)uL    HGB 12.8 12.0 - 16.0 g/dL    HCT 36.3 35.0 - 48.0 %    .0 150.0 - 450.0 10(3)uL    MCV 99.7 80.0 - 100.0 fL    MCH 35.2 (H) 26.0 - 34.0 pg    MCHC 35.3 31.0 - 37.0 g/dL    RDW 13.4 %    Neutrophil Absolute Prelim 1.94 1.50 - 7.70 x10 (3) uL    Neutrophil Absolute 1.94 1.50 - 7.70 x10(3) uL    Lymphocyte Absolute 1.02 1.00 - 4.00 x10(3) uL    Monocyte Absolute 0.36 0.10 - 1.00 x10(3) uL    Eosinophil Absolute 0.04 0.00 - 0.70 x10(3) uL    Basophil Absolute 0.06 0.00 - 0.20 x10(3) uL    Immature Granulocyte Absolute 0.01 0.00 - 1.00 x10(3) uL    Neutrophil % 56.6 %    Lymphocyte % 29.7 %    Monocyte % 10.5 %    Eosinophil % 1.2 %    Basophil % 1.7 %    Immature Granulocyte % 0.3 %         Lab Results   Component Value Date     55.6 (H) 06/14/2024     60.3 (H) 05/17/2024     64.2 (H) 04/19/2024     65.1 (H) 03/20/2024     59.1 (H) 02/23/2024       ASSESSMENT AND PLAN:     # Metastatic breast carcinoma: Diag 9/23, bone only disease.  Bx proven involvement T spine.  ER/HI +, Her 2 neg.      Letrozole started 9/23.  Abemaciclib started 10/23.  Tumor markers improving.  PET scan 3/24 shows radiologic improvement although she still has active disease in spine.  MRI spine pending.    # Diarrhea: Due to abemaciclib.  Consider reducing to 100 mg twice daily.    # Bone metastasis: Started zoledronic acid 10/23, Denosumab not covered.  Continue monthly for now.  Changed to every 2 months from 10/24.    # Severe back pain: RT completed, better.  Recommend repeating MRI as she has new scapular pain on right side.    # Abnormal LFTs: Recommend US liver.    # Headache: MRI brain 12/23 negative.    # Neutropenia: Due to abemaciclib.  Stable.    ORDERS PLACED:        Procedures    US ABDOMEN COMPLETE (CPT=76700)       Return in about 4 weeks (around 7/12/2024) for Labs, MD visit, Infusion.    Michelle Reinoso M.D.    [facility]    [address] [place], [address]    6/14/2024

## 2024-06-14 ENCOUNTER — TELEPHONE (OUTPATIENT)
Dept: HEMATOLOGY/ONCOLOGY | Facility: HOSPITAL | Age: 69
End: 2024-06-14

## 2024-06-14 ENCOUNTER — OFFICE VISIT (OUTPATIENT)
Dept: HEMATOLOGY/ONCOLOGY | Facility: HOSPITAL | Age: 69
End: 2024-06-14
Attending: INTERNAL MEDICINE
Payer: MEDICARE

## 2024-06-14 VITALS
OXYGEN SATURATION: 99 % | SYSTOLIC BLOOD PRESSURE: 185 MMHG | WEIGHT: 116.38 LBS | TEMPERATURE: 98 F | BODY MASS INDEX: 21.15 KG/M2 | HEART RATE: 69 BPM | DIASTOLIC BLOOD PRESSURE: 85 MMHG | RESPIRATION RATE: 16 BRPM | HEIGHT: 62.01 IN

## 2024-06-14 DIAGNOSIS — C50.919 CARCINOMA OF BREAST METASTATIC TO MULTIPLE SITES, UNSPECIFIED LATERALITY (HCC): Primary | ICD-10-CM

## 2024-06-14 DIAGNOSIS — G89.3 PAIN FROM BONE METASTASES (HCC): ICD-10-CM

## 2024-06-14 DIAGNOSIS — R79.89 ELEVATED LFTS: Primary | ICD-10-CM

## 2024-06-14 DIAGNOSIS — R53.83 OTHER FATIGUE: ICD-10-CM

## 2024-06-14 DIAGNOSIS — C79.51 PAIN FROM BONE METASTASES (HCC): ICD-10-CM

## 2024-06-14 DIAGNOSIS — T50.905D ADVERSE EFFECT OF DRUG, SUBSEQUENT ENCOUNTER: ICD-10-CM

## 2024-06-14 DIAGNOSIS — C50.919 CARCINOMA OF BREAST METASTATIC TO MULTIPLE SITES, UNSPECIFIED LATERALITY (HCC): ICD-10-CM

## 2024-06-14 LAB
ALBUMIN SERPL-MCNC: 4 G/DL (ref 3.4–5)
ALBUMIN/GLOB SERPL: 1.3 {RATIO} (ref 1–2)
ALP LIVER SERPL-CCNC: 181 U/L
ALT SERPL-CCNC: 67 U/L
ANION GAP SERPL CALC-SCNC: 3 MMOL/L (ref 0–18)
AST SERPL-CCNC: 45 U/L (ref 15–37)
BASOPHILS # BLD AUTO: 0.06 X10(3) UL (ref 0–0.2)
BASOPHILS NFR BLD AUTO: 1.7 %
BILIRUB SERPL-MCNC: 0.7 MG/DL (ref 0.1–2)
BRCA1 C.185DELAG BLD/T QL: 70.3 U/ML (ref ?–38)
BUN BLD-MCNC: 17 MG/DL (ref 9–23)
CALCIUM BLD-MCNC: 9.6 MG/DL (ref 8.5–10.1)
CANCER AG15-3 SERPL-ACNC: 55.6 U/ML (ref ?–35)
CHLORIDE SERPL-SCNC: 111 MMOL/L (ref 98–112)
CO2 SERPL-SCNC: 26 MMOL/L (ref 21–32)
CREAT BLD-MCNC: 1.09 MG/DL
EGFRCR SERPLBLD CKD-EPI 2021: 55 ML/MIN/1.73M2 (ref 60–?)
EOSINOPHIL # BLD AUTO: 0.04 X10(3) UL (ref 0–0.7)
EOSINOPHIL NFR BLD AUTO: 1.2 %
ERYTHROCYTE [DISTWIDTH] IN BLOOD BY AUTOMATED COUNT: 13.4 %
FASTING STATUS PATIENT QL REPORTED: NO
GLOBULIN PLAS-MCNC: 3.2 G/DL (ref 2.8–4.4)
GLUCOSE BLD-MCNC: 90 MG/DL (ref 70–99)
HCT VFR BLD AUTO: 36.3 %
HGB BLD-MCNC: 12.8 G/DL
IMM GRANULOCYTES # BLD AUTO: 0.01 X10(3) UL (ref 0–1)
IMM GRANULOCYTES NFR BLD: 0.3 %
LYMPHOCYTES # BLD AUTO: 1.02 X10(3) UL (ref 1–4)
LYMPHOCYTES NFR BLD AUTO: 29.7 %
MCH RBC QN AUTO: 35.2 PG (ref 26–34)
MCHC RBC AUTO-ENTMCNC: 35.3 G/DL (ref 31–37)
MCV RBC AUTO: 99.7 FL
MONOCYTES # BLD AUTO: 0.36 X10(3) UL (ref 0.1–1)
MONOCYTES NFR BLD AUTO: 10.5 %
NEUTROPHILS # BLD AUTO: 1.94 X10 (3) UL (ref 1.5–7.7)
NEUTROPHILS # BLD AUTO: 1.94 X10(3) UL (ref 1.5–7.7)
NEUTROPHILS NFR BLD AUTO: 56.6 %
OSMOLALITY SERPL CALC.SUM OF ELEC: 291 MOSM/KG (ref 275–295)
PLATELET # BLD AUTO: 155 10(3)UL (ref 150–450)
POTASSIUM SERPL-SCNC: 4.3 MMOL/L (ref 3.5–5.1)
PROT SERPL-MCNC: 7.2 G/DL (ref 6.4–8.2)
RBC # BLD AUTO: 3.64 X10(6)UL
SODIUM SERPL-SCNC: 140 MMOL/L (ref 136–145)
WBC # BLD AUTO: 3.4 X10(3) UL (ref 4–11)

## 2024-06-14 PROCEDURE — 99215 OFFICE O/P EST HI 40 MIN: CPT | Performed by: INTERNAL MEDICINE

## 2024-06-14 PROCEDURE — 96365 THER/PROPH/DIAG IV INF INIT: CPT

## 2024-06-14 NOTE — TELEPHONE ENCOUNTER
Contacted pt to let her know her liver function lab tests were a little elevated today, and Dr Reinoso ordered an US for evaluation.   She is driving so will send the information in [place] for her to call and schedule.   Pt verbalized understanding.

## 2024-06-14 NOTE — PROGRESS NOTES
Education Record    Learner:  Patient/ family member    Disease / Diagnosis:met breast cancer   Abemaciclib/ Letrozole.   Zometa     Barriers / Limitations:  None   Comments:    Method:  Discussion   Comments:    General Topics:  Plan of care reviewed   Comments:    Outcome:  Shows understanding   Comments:   Pt feeling great.   Recently on vacation and rode bikes, 35 miles one day.   Starting golf.   No new pain.   Occ upset stomach, and take imodium prn.   Hair loss has lessened, using a new natural product and  notices new hair growth.

## 2024-06-14 NOTE — PROGRESS NOTES
[facility] Note:  Renal Dose Adjustment for Zometa (Zoledronic acid)    Marija Elaine is a 69 year old female has been prescribed Zometa (Zoledronic acid)  4 mg IVPB once.    LABS:   Lab Results   Component Value Date/Time    CREATSERUM 1.09 (H) 06/14/2024 11:39 AM    BUN 17 06/14/2024 11:39 AM         CrCl: Estimated Creatinine Clearance: 40 mL/min (A) (based on ABW= 52.8 kg, SCr of 1.09 mg/dL (H)).    Calculated creatinine clearance is 40 to 49 mL/minute therefore, the dose of Zometa (Zoledronic acid) has been changed to 3.3 mg IVPB once per P&T approved protocol.     Thank you,  Deborah French [facility]  6/14/2024 12:21 PM

## 2024-06-14 NOTE — PROGRESS NOTES
Education Record    Learner:  Patient    Disease / Diagnosis: Breast cancer. Here for Zometa.    Barriers / Limitations:  None   Comments:    Method:  Discussion   Comments:    General Topics:  Medication and Plan of care reviewed   Comments:    Outcome:  Shows understanding   Comments:    Zometa infused without incident. AVS given and patient discharged in stable condition.

## 2024-06-15 ENCOUNTER — HOSPITAL ENCOUNTER (OUTPATIENT)
Dept: ULTRASOUND IMAGING | Facility: HOSPITAL | Age: 69
Discharge: HOME | End: 2024-06-15
Attending: INTERNAL MEDICINE

## 2024-06-15 DIAGNOSIS — R79.89 ELEVATED LFTS: ICD-10-CM

## 2024-06-15 DIAGNOSIS — C50.919 CARCINOMA OF BREAST METASTATIC TO MULTIPLE SITES, UNSPECIFIED LATERALITY (HCC): ICD-10-CM

## 2024-06-15 PROCEDURE — 76700 US EXAM ABDOM COMPLETE: CPT | Performed by: INTERNAL MEDICINE

## 2024-07-17 NOTE — PROGRESS NOTES
[facility] Progress Note      Patient Name:  Marija Elaine  YOB: 1955  Medical Record Number:  [id number]    Date of visit:  7/19/2024      CHIEF COMPLAINT: Metastatic breast carcinoma.    ONCOLOGY HISTORY:    Bone metastasis 9/23.  Biopsy T-spine 9/23-metastatic breast carcinoma.  Letrozole 9/23-  Abemaciclib 10/23-  RT 10/23-12/23    HPI:     69 year old female that I have followed since 9/23 after w/u of back pain and bone scan 9/23-multiple skeletal metastasis.  MRI C-spine showed metastatic disease at C6 with epidural tumor.  H/o L breast ca 2006 s/p L lumpectomy, RT, tamoxifen x 5 years.    Letrozole started 9/23.  MRI T-spine 9/23-diffuse osseous metastatic disease, most pronounced T2-T5.  No cord compression or pathologic fracture.  Mild epidural extension of tumor along T4, T5, T8.  PET scan 9/23-multiple areas of osseous metastatic disease.    CT biopsy T-spine 9/23-metastatic breast carcinoma, 50% ER, 25% OR, HER2 2+ by IHC, FISH pending.  Ki-67 10%.  Abemaciclib started 10/23.  Diarrhea is better.    SOCIAL HISTORY:    , lives in [place], .  2 sons in [place].  Older is  for spinal cord stimulator implants.  He has a 4-1/2-year-old child.  Younger son has a 1-1/2-year-old child.    ROS:     Constitutional: no fever, chills fatigue,night sweats or weight loss  Neurologic: no headache, seizures, diplopia or weakness  Respiratory: no cough, SOB or hemoptysis  Cardiovascular: no chest pain, ankle swelling, RIOS  GI: no nausea, vomiting, diarrhea or BRBPR  Musculoskeletal: no body-ache or joint pain  Dermatologic: no alopecia, rash, pruritis  : no hematuria, dysuria or frequency  Psych: no confusion or depression   Heme: no easy bruising or bleeding     ALLERGIES:    Allergies   Allergen Reactions    Levofloxacin HIVES     Levaquin         MEDICATIONS:    Current Outpatient Medications   Medication Sig Dispense Refill     Abemaciclib 150 MG Oral Tab Take 1 tablet (150 mg total) by mouth 2 (two) times daily. may take with or without food 60 tablet 6    ergocalciferol 1.25 MG (00416 UT) Oral Cap Take 1 capsule (50,000 Units total) by mouth once a week for 12 doses. 12 capsule 0    letrozole 2.5 MG Oral Tab Take 1 tablet (2.5 mg total) by mouth daily. 30 tablet 3    metoprolol succinate ER 25 MG Oral Tablet 24 Hr Take 1 tablet (25 mg total) by mouth daily.      HYDROcodone-acetaminophen  MG Oral Tab       Multiple Vitamin Oral Tab Take 1 tablet by mouth every other day.      ALPRAZolam 0.25 MG Oral Tab Take 1 tablet (0.25 mg total) by mouth As Directed.      Ibuprofen 200 MG Oral Cap Take by mouth as needed.      valsartan 160 MG Oral Tab Take 1 tablet (160 mg total) by mouth daily.         VITALS:  Height: 157.5 cm (5' 2.01\") (1313)  Weight: 54.4 kg (120 lb) (1313)  BSA (Calculated - sq m): 1.54 sq meters (1313)  Pulse: 71 (1313)  BP: 172/81 (1313)  Temp: 98.4 °F (36.9 °C) (1313)  Do Not Use - Resp Rate: --  SpO2: 99 % (1313)    PS:  ECO    PHYSICAL EXAM:    General: alert and oriented x 3, not in acute distress.  Accompanied by .  HEENT: CAROL, oropharynx  clear.  Neck: supple.  No JVD /adenopathy.  CVS: S1S2, regular  Rhythm, no murmurs.   Lungs: Clear to auscultation and percussion.  Abdomen: soft, no masses  Extremities:  No edema.  CNS: no focal deficit    Emotional well being: Patient's emotional well being was assessed.  No issues requiring acute psychosocial intervention were identified.     IMAGING:    LABS:     Recent Results (from the past 24 hour(s))   Comp Metabolic Panel (14)    Collection Time: 24  1:05 PM   Result Value Ref Range    Glucose 84 70 - 99 mg/dL    Sodium 137 136 - 145 mmol/L    Potassium 4.1 3.5 - 5.1 mmol/L    Chloride 104 98 - 112 mmol/L    CO2 27.0 21.0 - 32.0 mmol/L    Anion Gap 6 0 - 18 mmol/L    BUN 16 9 - 23 mg/dL    Creatinine 0.84  0.55 - 1.02 mg/dL    Calcium, Total 9.3 8.7 - 10.4 mg/dL    Calculated Osmolality 284 275 - 295 mOsm/kg    eGFR-Cr 75 >=60 mL/min/1.73m2    AST 66 (H) <34 U/L    ALT 95 (H) 10 - 49 U/L    Alkaline Phosphatase 194 (H) 55 - 142 U/L    Bilirubin, Total 0.5 0.2 - 1.1 mg/dL    Total Protein 6.7 5.7 - 8.2 g/dL    Albumin 4.4 3.2 - 4.8 g/dL    Globulin  2.3 (L) 2.8 - 4.4 g/dL    A/G Ratio 1.9 1.0 - 2.0    Patient Fasting for CMP? Patient not present    CANCER ANTIGEN (CA) 15-3 [E]    Collection Time: 07/19/24  1:05 PM   Result Value Ref Range    Cancer Ag 15-3 47.2 (H) <=32.4 U/mL   CBC W/ DIFFERENTIAL    Collection Time: 07/19/24  1:05 PM   Result Value Ref Range    WBC 3.4 (L) 4.0 - 11.0 x10(3) uL    RBC 3.36 (L) 3.80 - 5.30 x10(6)uL    HGB 12.0 12.0 - 16.0 g/dL    HCT 34.0 (L) 35.0 - 48.0 %    .0 (L) 150.0 - 450.0 10(3)uL    Immature Platelet Fraction 2.4 0.0 - 7.0 %    .2 (H) 80.0 - 100.0 fL    MCH 35.7 (H) 26.0 - 34.0 pg    MCHC 35.3 31.0 - 37.0 g/dL    RDW 13.3 %    Neutrophil Absolute Prelim 1.50 1.50 - 7.70 x10 (3) uL    Neutrophil Absolute 1.50 1.50 - 7.70 x10(3) uL    Lymphocyte Absolute 1.30 1.00 - 4.00 x10(3) uL    Monocyte Absolute 0.45 0.10 - 1.00 x10(3) uL    Eosinophil Absolute 0.05 0.00 - 0.70 x10(3) uL    Basophil Absolute 0.06 0.00 - 0.20 x10(3) uL    Immature Granulocyte Absolute 0.01 0.00 - 1.00 x10(3) uL    Neutrophil % 44.4 %    Lymphocyte % 38.6 %    Monocyte % 13.4 %    Eosinophil % 1.5 %    Basophil % 1.8 %    Immature Granulocyte % 0.3 %         Lab Results   Component Value Date     47.2 (H) 07/19/2024     55.6 (H) 06/14/2024     60.3 (H) 05/17/2024     64.2 (H) 04/19/2024     65.1 (H) 03/20/2024       ASSESSMENT AND PLAN:     # Metastatic breast carcinoma: Diag 9/23, bone only disease.  Bx proven involvement T spine.  ER/UT +, Her 2 neg.     Letrozole started 9/23.  Abemaciclib started 10/23.  Tumor markers improving.  PET scan 3/24 shows radiologic  improvement although she still has active disease in spine.  MRI spine pending.    # Diarrhea: Due to abemaciclib.  Consider reducing to 100 mg twice daily if worse, she is better now.     # Bone metastasis: Started zoledronic acid 10/23, Denosumab not covered.  Continue monthly for now.  Changed to every 2 months from 10/24 or sooner based on MRIs..    # Severe back pain: RT completed, better.  Recommend repeating MRI as she has new scapular pain on right side.    # Abnormal LFTs: US liver-cholelithiasis, no acute cholecystitis.  HIDA scan pending.    # Headache: MRI brain 12/23 negative.    # Neutropenia: Due to abemaciclib.  Stable.    ORDERS PLACED:    Return in about 1 month (around 8/19/2024) for Labs, MD visit, Infusion.    Michelle Reinoso M.D.    [facility]    [facility]  [address] [place], [address]    7/19/2024

## 2024-07-19 ENCOUNTER — OFFICE VISIT (OUTPATIENT)
Dept: HEMATOLOGY/ONCOLOGY | Facility: HOSPITAL | Age: 69
End: 2024-07-19
Attending: INTERNAL MEDICINE
Payer: MEDICARE

## 2024-07-19 VITALS
TEMPERATURE: 98 F | DIASTOLIC BLOOD PRESSURE: 81 MMHG | SYSTOLIC BLOOD PRESSURE: 172 MMHG | RESPIRATION RATE: 16 BRPM | OXYGEN SATURATION: 99 % | WEIGHT: 120 LBS | BODY MASS INDEX: 21.8 KG/M2 | HEART RATE: 71 BPM | HEIGHT: 62.01 IN

## 2024-07-19 DIAGNOSIS — G89.3 PAIN FROM BONE METASTASES (HCC): ICD-10-CM

## 2024-07-19 DIAGNOSIS — C50.919 CARCINOMA OF BREAST METASTATIC TO MULTIPLE SITES, UNSPECIFIED LATERALITY (HCC): ICD-10-CM

## 2024-07-19 DIAGNOSIS — D70.2 NEUTROPENIA, DRUG-INDUCED (HCC): ICD-10-CM

## 2024-07-19 DIAGNOSIS — L65.9 HAIR THINNING: ICD-10-CM

## 2024-07-19 DIAGNOSIS — K52.1 DIARRHEA DUE TO DRUG: ICD-10-CM

## 2024-07-19 DIAGNOSIS — C50.919 CARCINOMA OF BREAST METASTATIC TO MULTIPLE SITES, UNSPECIFIED LATERALITY (HCC): Primary | ICD-10-CM

## 2024-07-19 DIAGNOSIS — R79.89 ELEVATED LFTS: ICD-10-CM

## 2024-07-19 DIAGNOSIS — G89.3 PAIN FROM BONE METASTASES (HCC): Primary | ICD-10-CM

## 2024-07-19 DIAGNOSIS — R79.89 ABNORMAL LFTS: ICD-10-CM

## 2024-07-19 DIAGNOSIS — C79.51 PAIN FROM BONE METASTASES (HCC): Primary | ICD-10-CM

## 2024-07-19 DIAGNOSIS — C79.51 CANCER, METASTATIC TO BONE (HCC): ICD-10-CM

## 2024-07-19 DIAGNOSIS — C79.51 PAIN FROM BONE METASTASES (HCC): ICD-10-CM

## 2024-07-19 LAB
ALBUMIN SERPL-MCNC: 4.4 G/DL (ref 3.2–4.8)
ALBUMIN/GLOB SERPL: 1.9 {RATIO} (ref 1–2)
ALP LIVER SERPL-CCNC: 194 U/L
ALT SERPL-CCNC: 95 U/L
ANION GAP SERPL CALC-SCNC: 6 MMOL/L (ref 0–18)
AST SERPL-CCNC: 66 U/L (ref ?–34)
BASOPHILS # BLD AUTO: 0.06 X10(3) UL (ref 0–0.2)
BASOPHILS NFR BLD AUTO: 1.8 %
BILIRUB SERPL-MCNC: 0.5 MG/DL (ref 0.2–1.1)
BRCA1 C.185DELAG BLD/T QL: 75.1 U/ML (ref ?–38)
BUN BLD-MCNC: 16 MG/DL (ref 9–23)
CALCIUM BLD-MCNC: 9.3 MG/DL (ref 8.7–10.4)
CANCER AG15-3 SERPL-ACNC: 47.2 U/ML (ref ?–32.4)
CHLORIDE SERPL-SCNC: 104 MMOL/L (ref 98–112)
CO2 SERPL-SCNC: 27 MMOL/L (ref 21–32)
CREAT BLD-MCNC: 0.84 MG/DL
EGFRCR SERPLBLD CKD-EPI 2021: 75 ML/MIN/1.73M2 (ref 60–?)
EOSINOPHIL # BLD AUTO: 0.05 X10(3) UL (ref 0–0.7)
EOSINOPHIL NFR BLD AUTO: 1.5 %
ERYTHROCYTE [DISTWIDTH] IN BLOOD BY AUTOMATED COUNT: 13.3 %
GLOBULIN PLAS-MCNC: 2.3 G/DL (ref 2.8–4.4)
GLUCOSE BLD-MCNC: 84 MG/DL (ref 70–99)
HCT VFR BLD AUTO: 34 %
HGB BLD-MCNC: 12 G/DL
IMM GRANULOCYTES # BLD AUTO: 0.01 X10(3) UL (ref 0–1)
IMM GRANULOCYTES NFR BLD: 0.3 %
LYMPHOCYTES # BLD AUTO: 1.3 X10(3) UL (ref 1–4)
LYMPHOCYTES NFR BLD AUTO: 38.6 %
MCH RBC QN AUTO: 35.7 PG (ref 26–34)
MCHC RBC AUTO-ENTMCNC: 35.3 G/DL (ref 31–37)
MCV RBC AUTO: 101.2 FL
MONOCYTES # BLD AUTO: 0.45 X10(3) UL (ref 0.1–1)
MONOCYTES NFR BLD AUTO: 13.4 %
NEUTROPHILS # BLD AUTO: 1.5 X10 (3) UL (ref 1.5–7.7)
NEUTROPHILS # BLD AUTO: 1.5 X10(3) UL (ref 1.5–7.7)
NEUTROPHILS NFR BLD AUTO: 44.4 %
OSMOLALITY SERPL CALC.SUM OF ELEC: 284 MOSM/KG (ref 275–295)
PLATELET # BLD AUTO: 146 10(3)UL (ref 150–450)
PLATELETS.RETICULATED NFR BLD AUTO: 2.4 % (ref 0–7)
POTASSIUM SERPL-SCNC: 4.1 MMOL/L (ref 3.5–5.1)
PROT SERPL-MCNC: 6.7 G/DL (ref 5.7–8.2)
RBC # BLD AUTO: 3.36 X10(6)UL
SODIUM SERPL-SCNC: 137 MMOL/L (ref 136–145)
WBC # BLD AUTO: 3.4 X10(3) UL (ref 4–11)

## 2024-07-19 PROCEDURE — 96374 THER/PROPH/DIAG INJ IV PUSH: CPT

## 2024-07-19 PROCEDURE — 99215 OFFICE O/P EST HI 40 MIN: CPT | Performed by: INTERNAL MEDICINE

## 2024-07-19 NOTE — PROGRESS NOTES
[facility] Pharmacy Note:  Renal Dose Adjustment for Zometa (Zoledronic acid)    Marija Elaine is a 69 year old female has been prescribed Zometa (Zoledronic acid)  4 mg IVPB once.    LABS:   Lab Results   Component Value Date/Time    CREATSERUM 0.84 07/19/2024 01:05 PM    BUN 16 07/19/2024 01:05 PM         CrCl:   Estimated CrCl ~ 53 ml/min (based on Scr 0.84, using actual body weight per protocol)    Calculated creatinine clearance is 50 to 60 mL/minute  therefore, the dose of Zometa (Zoledronic acid) has been changed to 3.5 mg IVPB once per P&T approved protocol.     Thank you,  Rhianna Foster [facility]  7/19/2024 2:28 PM

## 2024-07-19 NOTE — PROGRESS NOTES
Education Record    Learner:  Patient    Pt here for:  breast ca    Barriers / Limitations:  None    Method:  Brief focused, printed material and  reinforcement    General Topics:  Plan of care reviewed    Outcome: Pt tolerated infusion with no c/o.     Zometa after labs/MD. Tolerated well. Back in one month. MD also aware of elevated LFTs--no change in PO meds

## 2024-07-22 ENCOUNTER — HOSPITAL ENCOUNTER (OUTPATIENT)
Dept: NUCLEAR MEDICINE | Facility: HOSPITAL | Age: 69
Discharge: HOME | End: 2024-07-22
Attending: INTERNAL MEDICINE
Payer: MEDICARE

## 2024-07-22 DIAGNOSIS — K80.10 CALCULUS OF GALLBLADDER WITH CHOLECYSTITIS WITHOUT BILIARY OBSTRUCTION, UNSPECIFIED CHOLECYSTITIS ACUITY: ICD-10-CM

## 2024-07-22 DIAGNOSIS — R79.89 ELEVATED LFTS: ICD-10-CM

## 2024-07-22 PROCEDURE — 78227 HEPATOBIL SYST IMAGE W/DRUG: CPT | Performed by: INTERNAL MEDICINE

## 2024-07-23 ENCOUNTER — HOSPITAL ENCOUNTER (OUTPATIENT)
Dept: MRI IMAGING | Age: 69
Discharge: HOME | End: 2024-07-23
Attending: INTERNAL MEDICINE
Payer: MEDICARE

## 2024-07-23 DIAGNOSIS — C79.51 CANCER, METASTATIC TO BONE (HCC): ICD-10-CM

## 2024-07-23 DIAGNOSIS — M54.9 CHRONIC MIDLINE BACK PAIN, UNSPECIFIED BACK LOCATION: ICD-10-CM

## 2024-07-23 DIAGNOSIS — C50.919 CARCINOMA OF BREAST METASTATIC TO MULTIPLE SITES, UNSPECIFIED LATERALITY (HCC): ICD-10-CM

## 2024-07-23 DIAGNOSIS — G89.29 CHRONIC MIDLINE BACK PAIN, UNSPECIFIED BACK LOCATION: ICD-10-CM

## 2024-07-23 PROCEDURE — A9575 INJ GADOTERATE MEGLUMI 0.1ML: HCPCS | Performed by: INTERNAL MEDICINE

## 2024-07-23 PROCEDURE — 72156 MRI NECK SPINE W/O & W/DYE: CPT | Performed by: INTERNAL MEDICINE

## 2024-07-23 PROCEDURE — 72158 MRI LUMBAR SPINE W/O & W/DYE: CPT | Performed by: INTERNAL MEDICINE

## 2024-07-23 PROCEDURE — 72157 MRI CHEST SPINE W/O & W/DYE: CPT | Performed by: INTERNAL MEDICINE

## 2024-07-23 RX ORDER — DIPHENHYDRAMINE HYDROCHLORIDE 50 MG/ML
10 INJECTION, SOLUTION INTRAMUSCULAR; INTRAVENOUS
Status: COMPLETED | OUTPATIENT
Start: 2024-07-23 | End: 2024-07-23

## 2024-07-23 RX ADMIN — DIPHENHYDRAMINE HYDROCHLORIDE 10 ML: 50 INJECTION, SOLUTION INTRAMUSCULAR; INTRAVENOUS at 14:58:00

## 2024-08-14 NOTE — PROGRESS NOTES
[facility] Progress Note      Patient Name:  Marija Elaine  YOB: 1955  Medical Record Number:  [id number]    Date of visit:  2024      CHIEF COMPLAINT: Metastatic breast carcinoma.    ONCOLOGY HISTORY:    Bone metastasis .  Biopsy T-spine -metastatic breast carcinoma.  Letrozole -  Abemaciclib 10/23-  RT C6 10/23-    HPI:     69 year old female that I have followed since  after w/u of back pain and bone scan -multiple skeletal metastasis.  MRI C-spine showed metastatic disease at C6 with epidural tumor.  H/o L breast ca  s/p L lumpectomy, RT, tamoxifen x 5 years.    Letrozole started .  MRI T-spine -diffuse osseous metastatic disease, most pronounced T2-T5.  No cord compression or pathologic fracture.  Mild epidural extension of tumor along T4, T5, T8.  PET scan -multiple areas of osseous metastatic disease.    CT biopsy T-spine -metastatic breast carcinoma, 50% ER, 25% OH, HER2 2+ by IHC, FISH pending.  Ki-67 10%.  Abemaciclib started 10/23.      US abdomen  for elevated LFTs showed cholelithiasis.  Distended gallbladder.  HIDA scan did not show any acute cholecystitis.    Poor appetite, more hot flashes, T spine pain.       SOCIAL HISTORY:    , lives in [place], .  2 sons in [place].  Older is  for spinal cord stimulator implants.  He has a 5-year-old child.  Younger son has a 2-year-old child and .    ROS:     Constitutional: no fever, chills fatigue,night sweats or weight loss  Neurologic: no headache, seizures, diplopia or weakness  Respiratory: no cough, SOB or hemoptysis  Cardiovascular: no chest pain, ankle swelling, RIOS  GI: no nausea, vomiting, diarrhea or BRBPR  Musculoskeletal: T-spine pain  Dermatologic: no alopecia, rash, pruritis  : no hematuria, dysuria or frequency  Psych: no confusion or depression   Heme: no easy bruising or bleeding     ALLERGIES:     Allergies   Allergen Reactions    Levofloxacin HIVES     Levaquin         MEDICATIONS:    Current Outpatient Medications   Medication Sig Dispense Refill    Abemaciclib 150 MG Oral Tab Take 1 tablet (150 mg total) by mouth 2 (two) times daily. may take with or without food 60 tablet 6    letrozole 2.5 MG Oral Tab Take 1 tablet (2.5 mg total) by mouth daily. 30 tablet 3    metoprolol succinate ER 25 MG Oral Tablet 24 Hr Take 1 tablet (25 mg total) by mouth daily.      HYDROcodone-acetaminophen  MG Oral Tab       Multiple Vitamin Oral Tab Take 1 tablet by mouth every other day.      ALPRAZolam 0.25 MG Oral Tab Take 1 tablet (0.25 mg total) by mouth As Directed.      Ibuprofen 200 MG Oral Cap Take by mouth as needed.      valsartan 160 MG Oral Tab Take 1 tablet (160 mg total) by mouth daily.         VITALS:  Height: 157.5 cm (5' 2.01\") (1055)  Weight: 55 kg (121 lb 3.2 oz) (1055)  BSA (Calculated - sq m): 1.55 sq meters (1055)  Pulse: 70 (1055)  BP: 151/84 (1055)  Temp: 97.3 °F (36.3 °C) (1055)  Do Not Use - Resp Rate: --  SpO2: 98 % (1055)    PS:  ECO    PHYSICAL EXAM:    General: alert and oriented x 3, not in acute distress.  Accompanied by .  HEENT: CAROL, oropharynx  clear.  Neck: supple.  No JVD /adenopathy.  CVS: S1S2, regular  Rhythm, no murmurs.   Lungs: Clear to auscultation and percussion.  Abdomen: soft, no masses  Extremities:  No edema.  CNS: no focal deficit    Emotional well being: Patient's emotional well being was assessed.  No issues requiring acute psychosocial intervention were identified.     IMAGING:    LABS:     Recent Results (from the past 24 hour(s))   CBC With Differential With Platelet    Collection Time: 24 10:44 AM   Result Value Ref Range    WBC 2.7 (L) 4.0 - 11.0 x10(3) uL    RBC 3.64 (L) 3.80 - 5.30 x10(6)uL    HGB 13.0 12.0 - 16.0 g/dL    HCT 37.2 35.0 - 48.0 %    .0 150.0 - 450.0 10(3)uL    .2 (H) 80.0 -  100.0 fL    MCH 35.7 (H) 26.0 - 34.0 pg    MCHC 34.9 31.0 - 37.0 g/dL    RDW 13.2 %    Neutrophil Absolute Prelim 1.45 (L) 1.50 - 7.70 x10 (3) uL    Neutrophil Absolute 1.45 (L) 1.50 - 7.70 x10(3) uL    Lymphocyte Absolute 0.81 (L) 1.00 - 4.00 x10(3) uL    Monocyte Absolute 0.25 0.10 - 1.00 x10(3) uL    Eosinophil Absolute 0.08 0.00 - 0.70 x10(3) uL    Basophil Absolute 0.06 0.00 - 0.20 x10(3) uL    Immature Granulocyte Absolute 0.01 0.00 - 1.00 x10(3) uL    Neutrophil % 54.4 %    Lymphocyte % 30.5 %    Monocyte % 9.4 %    Eosinophil % 3.0 %    Basophil % 2.3 %    Immature Granulocyte % 0.4 %   Comp Metabolic Panel (14)    Collection Time: 08/16/24 10:44 AM   Result Value Ref Range    Glucose 117 (H) 70 - 99 mg/dL    Sodium 138 136 - 145 mmol/L    Potassium 4.5 3.5 - 5.1 mmol/L    Chloride 105 98 - 112 mmol/L    CO2 30.0 21.0 - 32.0 mmol/L    Anion Gap 3 0 - 18 mmol/L    BUN 13 9 - 23 mg/dL    Creatinine 0.91 0.55 - 1.02 mg/dL    Calcium, Total 10.2 8.7 - 10.4 mg/dL    Calculated Osmolality 287 275 - 295 mOsm/kg    eGFR-Cr 68 >=60 mL/min/1.73m2    AST 55 (H) <34 U/L    ALT 68 (H) 10 - 49 U/L    Alkaline Phosphatase 219 (H) 55 - 142 U/L    Bilirubin, Total 0.6 0.2 - 1.1 mg/dL    Total Protein 7.1 5.7 - 8.2 g/dL    Albumin 4.8 3.2 - 4.8 g/dL    Globulin  2.3 2.0 - 3.5 g/dL    A/G Ratio 2.1 (H) 1.0 - 2.0    Patient Fasting for CMP? Patient not present    CA 27.29 [E]    Collection Time: 08/16/24 10:44 AM   Result Value Ref Range    Breast Carcinoma Ag [id number] 78.8 (H) <38.0 u/mL   CANCER ANTIGEN (CA) 15-3 [E]    Collection Time: 08/16/24 10:44 AM   Result Value Ref Range    Cancer Ag 15-3 42.9 (H) <=32.4 U/mL         Lab Results   Component Value Date     42.9 (H) 08/16/2024     47.2 (H) 07/19/2024     55.6 (H) 06/14/2024     60.3 (H) 05/17/2024     64.2 (H) 04/19/2024       ASSESSMENT AND PLAN:     # Metastatic breast carcinoma: Diag 9/23, bone only disease.  Bx proven involvement T spine.   ER/NM +, Her 2 neg.     Letrozole started 9/23.  Abemaciclib started 10/23.  Tumor markers improving.  PET scan 3/24 shows radiologic improvement although she still has active disease in spine.  Continue current treatment.    # Bone metastasis: Started zoledronic acid 10/23 as denosumab was not covered.  MRI spine 7/24 showed evidence of similar metastatic disease in C-spine, there was possible trace epidural tumor at C6-C7, has received RT to C6.  T-spine showed favorable response with improvement, L-spine also showed improvement though persistent tumor was noted.  Continue present management.  On monthly zoledronic acid, consider changing to q 2 months from 10/24.    # Diarrhea: Due to abemaciclib.  Consider reducing to 100 mg twice daily if worse, she is better now.     # Severe back pain: Received RT to C-spine.  MRI of entire spine 7/24 does not show any new areas of disease.      # Abnormal LFTs: US liver-cholelithiasis, no acute cholecystitis.  HIDA scan did not show any cholecystitis.  Referred to surgery.    # Headache: MRI brain 12/23 negative.    # Neutropenia: Due to abemaciclib.  Stable.    Send NGS next visit.    ORDERS PLACED:    Return in about 4 weeks (around 9/13/2024) for Labs, MD visit, Infusion.    Michelle Reinoso M.D.    [facility]    [address] [place], [address]    8/16/2024

## 2024-08-16 ENCOUNTER — OFFICE VISIT (OUTPATIENT)
Dept: HEMATOLOGY/ONCOLOGY | Facility: HOSPITAL | Age: 69
End: 2024-08-16
Attending: INTERNAL MEDICINE
Payer: MEDICARE

## 2024-08-16 VITALS
HEART RATE: 70 BPM | RESPIRATION RATE: 14 BRPM | TEMPERATURE: 97 F | DIASTOLIC BLOOD PRESSURE: 84 MMHG | SYSTOLIC BLOOD PRESSURE: 151 MMHG | HEIGHT: 62.01 IN | OXYGEN SATURATION: 98 % | BODY MASS INDEX: 22.02 KG/M2 | WEIGHT: 121.19 LBS

## 2024-08-16 DIAGNOSIS — R79.89 ELEVATED LFTS: ICD-10-CM

## 2024-08-16 DIAGNOSIS — G89.3 PAIN FROM BONE METASTASES (HCC): ICD-10-CM

## 2024-08-16 DIAGNOSIS — C50.919 CARCINOMA OF BREAST METASTATIC TO MULTIPLE SITES, UNSPECIFIED LATERALITY (HCC): ICD-10-CM

## 2024-08-16 DIAGNOSIS — C79.51 CANCER, METASTATIC TO BONE (HCC): ICD-10-CM

## 2024-08-16 DIAGNOSIS — C79.51 PAIN FROM BONE METASTASES (HCC): Primary | ICD-10-CM

## 2024-08-16 DIAGNOSIS — G89.3 PAIN FROM BONE METASTASES (HCC): Primary | ICD-10-CM

## 2024-08-16 DIAGNOSIS — K52.1 DIARRHEA DUE TO DRUG: ICD-10-CM

## 2024-08-16 DIAGNOSIS — R79.89 ABNORMAL LFTS: ICD-10-CM

## 2024-08-16 DIAGNOSIS — C50.919 CARCINOMA OF BREAST METASTATIC TO MULTIPLE SITES, UNSPECIFIED LATERALITY (HCC): Primary | ICD-10-CM

## 2024-08-16 DIAGNOSIS — C79.51 PAIN FROM BONE METASTASES (HCC): ICD-10-CM

## 2024-08-16 LAB
ALBUMIN SERPL-MCNC: 4.8 G/DL (ref 3.2–4.8)
ALBUMIN/GLOB SERPL: 2.1 {RATIO} (ref 1–2)
ALP LIVER SERPL-CCNC: 219 U/L
ALT SERPL-CCNC: 68 U/L
ANION GAP SERPL CALC-SCNC: 3 MMOL/L (ref 0–18)
AST SERPL-CCNC: 55 U/L (ref ?–34)
BASOPHILS # BLD AUTO: 0.06 X10(3) UL (ref 0–0.2)
BASOPHILS NFR BLD AUTO: 2.3 %
BILIRUB SERPL-MCNC: 0.6 MG/DL (ref 0.2–1.1)
BRCA1 C.185DELAG BLD/T QL: 78.8 U/ML (ref ?–38)
BUN BLD-MCNC: 13 MG/DL (ref 9–23)
CALCIUM BLD-MCNC: 10.2 MG/DL (ref 8.7–10.4)
CANCER AG15-3 SERPL-ACNC: 42.9 U/ML (ref ?–32.4)
CHLORIDE SERPL-SCNC: 105 MMOL/L (ref 98–112)
CO2 SERPL-SCNC: 30 MMOL/L (ref 21–32)
CREAT BLD-MCNC: 0.91 MG/DL
EGFRCR SERPLBLD CKD-EPI 2021: 68 ML/MIN/1.73M2 (ref 60–?)
EOSINOPHIL # BLD AUTO: 0.08 X10(3) UL (ref 0–0.7)
EOSINOPHIL NFR BLD AUTO: 3 %
ERYTHROCYTE [DISTWIDTH] IN BLOOD BY AUTOMATED COUNT: 13.2 %
GLOBULIN PLAS-MCNC: 2.3 G/DL (ref 2–3.5)
GLUCOSE BLD-MCNC: 117 MG/DL (ref 70–99)
HCT VFR BLD AUTO: 37.2 %
HGB BLD-MCNC: 13 G/DL
IMM GRANULOCYTES # BLD AUTO: 0.01 X10(3) UL (ref 0–1)
IMM GRANULOCYTES NFR BLD: 0.4 %
LYMPHOCYTES # BLD AUTO: 0.81 X10(3) UL (ref 1–4)
LYMPHOCYTES NFR BLD AUTO: 30.5 %
MCH RBC QN AUTO: 35.7 PG (ref 26–34)
MCHC RBC AUTO-ENTMCNC: 34.9 G/DL (ref 31–37)
MCV RBC AUTO: 102.2 FL
MONOCYTES # BLD AUTO: 0.25 X10(3) UL (ref 0.1–1)
MONOCYTES NFR BLD AUTO: 9.4 %
NEUTROPHILS # BLD AUTO: 1.45 X10 (3) UL (ref 1.5–7.7)
NEUTROPHILS # BLD AUTO: 1.45 X10(3) UL (ref 1.5–7.7)
NEUTROPHILS NFR BLD AUTO: 54.4 %
OSMOLALITY SERPL CALC.SUM OF ELEC: 287 MOSM/KG (ref 275–295)
PLATELET # BLD AUTO: 151 10(3)UL (ref 150–450)
POTASSIUM SERPL-SCNC: 4.5 MMOL/L (ref 3.5–5.1)
PROT SERPL-MCNC: 7.1 G/DL (ref 5.7–8.2)
RBC # BLD AUTO: 3.64 X10(6)UL
SODIUM SERPL-SCNC: 138 MMOL/L (ref 136–145)
WBC # BLD AUTO: 2.7 X10(3) UL (ref 4–11)

## 2024-08-16 PROCEDURE — 96374 THER/PROPH/DIAG INJ IV PUSH: CPT

## 2024-08-16 PROCEDURE — 99215 OFFICE O/P EST HI 40 MIN: CPT | Performed by: INTERNAL MEDICINE

## 2024-08-16 NOTE — PROGRESS NOTES
Education Record    Learner:  Patient/ spouse    Disease / Diagnosis:met breast ca  OTV Zometa  Abema/ letrozole.       Barriers / Limitations:  None   Comments:    Method:  Discussion   Comments:    General Topics:  Plan of care reviewed   Comments:    Outcome:  Shows understanding   Comments:   Pt feeling well.   Appetite remains low, but weight stable.   Remains active with  and walking.   Bowels are ok.   Some thoracic muscle pain, has had in the past.   Recent MRI's to go over results.   No dental issues or work planned.

## 2024-09-06 DIAGNOSIS — C50.919 CARCINOMA OF BREAST METASTATIC TO MULTIPLE SITES, UNSPECIFIED LATERALITY (HCC): ICD-10-CM

## 2024-09-06 DIAGNOSIS — C50.912 CARCINOMA OF LEFT BREAST METASTATIC TO MULTIPLE SITES (HCC): ICD-10-CM

## 2024-09-06 RX ORDER — LETROZOLE 2.5 MG/1
2.5 TABLET, FILM COATED ORAL DAILY
Qty: 30 TABLET | Refills: 3 | Status: SHIPPED | OUTPATIENT
Start: 2024-09-06

## 2024-09-11 NOTE — PROGRESS NOTES
[facility] Progress Note      Patient Name:  Marija Elaine  YOB: 1955  Medical Record Number:  [id number]    Date of visit:  2024    CHIEF COMPLAINT: Metastatic breast carcinoma.    ONCOLOGY HISTORY:    Bone metastasis .  Biopsy T-spine -metastatic breast carcinoma.  Letrozole -  Abemaciclib 10/23-  RT C6 10/23-    HPI:     69 year old female that I have followed since  after w/u of back pain and bone scan -multiple skeletal metastasis.  MRI C-spine -metastatic disease at C6 with epidural tumor.  H/o L breast ca  s/p L lumpectomy, RT, tamoxifen x 5 years.    Letrozole started .  MRI T-spine -diffuse osseous metastatic disease, most pronounced T2-T5.   Mild epidural extension of tumor along T4, T5, T8.  PET scan -multiple areas of osseous metastatic disease.    CT biopsy T-spine -metastatic breast carcinoma, 50% ER, 25% OH, HER2 2+ by IHC, FISH pending.  Ki-67 10%.  Abemaciclib started 10/23.      US abdomen  for elevated LFTs showed cholelithiasis.  Distended gallbladder.  HIDA scan did not show any acute cholecystitis. Upcoming appt with surgeons.     SOCIAL HISTORY:    , lives in [place], .  2 sons in [place].  Older is  for spinal cord stimulator implants.  He has a 5-year-old child.  Younger son has a 2-year-old child and .    ROS:     Constitutional: no fever, chills fatigue,night sweats or weight loss  Neurologic: no headache, seizures, diplopia or weakness  Respiratory: no cough, SOB or hemoptysis  Cardiovascular: no chest pain, ankle swelling, RIOS  GI: no nausea, vomiting, diarrhea or BRBPR  Musculoskeletal: T-spine pain  Dermatologic: no alopecia, rash, pruritis  : no hematuria, dysuria or frequency  Psych: no confusion or depression   Heme: no easy bruising or bleeding     ALLERGIES:    Allergies   Allergen Reactions    Levofloxacin HIVES     Levaquin          MEDICATIONS:    Current Outpatient Medications   Medication Sig Dispense Refill    letrozole 2.5 MG Oral Tab Take 1 tablet (2.5 mg total) by mouth daily. 30 tablet 3    Abemaciclib 150 MG Oral Tab Take 1 tablet (150 mg total) by mouth 2 (two) times daily. may take with or without food 60 tablet 6    metoprolol succinate ER 25 MG Oral Tablet 24 Hr Take 1 tablet (25 mg total) by mouth daily.      Multiple Vitamin Oral Tab Take 1 tablet by mouth every other day.      ALPRAZolam 0.25 MG Oral Tab Take 1 tablet (0.25 mg total) by mouth As Directed.      Ibuprofen 200 MG Oral Cap Take by mouth as needed.      valsartan 160 MG Oral Tab Take 1 tablet (160 mg total) by mouth daily.      HYDROcodone-acetaminophen  MG Oral Tab  (Patient not taking: Reported on 2024)         VITALS:  Height: 157.5 cm (5' 2.01\") (1129)  Weight: 53.1 kg (117 lb) (1129)  BSA (Calculated - sq m): 1.52 sq meters (1129)  Pulse: 63 (1129)  BP: 156/84 (1129)  Temp: 98.6 °F (37 °C) (1129)  Do Not Use - Resp Rate: --  SpO2: 100 % (1129)    PS:  ECO    PHYSICAL EXAM:    General: alert and oriented x 3, not in acute distress.  Accompanied by .  HEENT: CAROL, oropharynx  clear.  Neck: supple.  No JVD /adenopathy.  CVS: S1S2, regular  Rhythm, no murmurs.   Lungs: Clear to auscultation and percussion.  Abdomen: soft, no masses  Extremities:  No edema.  CNS: no focal deficit    Emotional well being: Patient's emotional well being was assessed.  No issues requiring acute psychosocial intervention were identified.     IMAGING:    LABS:     Recent Results (from the past 24 hour(s))   CBC With Differential With Platelet    Collection Time: 24 11:13 AM   Result Value Ref Range    WBC 2.9 (L) 4.0 - 11.0 x10(3) uL    RBC 3.51 (L) 3.80 - 5.30 x10(6)uL    HGB 12.4 12.0 - 16.0 g/dL    HCT 35.3 35.0 - 48.0 %    .0 (L) 150.0 - 450.0 10(3)uL    .6 (H) 80.0 - 100.0 fL    MCH 35.3 (H)  26.0 - 34.0 pg    MCHC 35.1 31.0 - 37.0 g/dL    RDW 13.4 %    Neutrophil Absolute Prelim 1.61 1.50 - 7.70 x10 (3) uL    Neutrophil Absolute 1.61 1.50 - 7.70 x10(3) uL    Lymphocyte Absolute 0.89 (L) 1.00 - 4.00 x10(3) uL    Monocyte Absolute 0.30 0.10 - 1.00 x10(3) uL    Eosinophil Absolute 0.07 0.00 - 0.70 x10(3) uL    Basophil Absolute 0.05 0.00 - 0.20 x10(3) uL    Immature Granulocyte Absolute 0.01 0.00 - 1.00 x10(3) uL    Neutrophil % 55.0 %    Lymphocyte % 30.4 %    Monocyte % 10.2 %    Eosinophil % 2.4 %    Basophil % 1.7 %    Immature Granulocyte % 0.3 %   Comp Metabolic Panel (14)    Collection Time: 09/13/24 11:13 AM   Result Value Ref Range    Glucose 149 (H) 70 - 99 mg/dL    Sodium 140 136 - 145 mmol/L    Potassium 4.0 3.5 - 5.1 mmol/L    Chloride 106 98 - 112 mmol/L    CO2 27.0 21.0 - 32.0 mmol/L    Anion Gap 7 0 - 18 mmol/L    BUN 14 9 - 23 mg/dL    Creatinine 1.00 0.55 - 1.02 mg/dL    Calcium, Total 10.0 8.7 - 10.4 mg/dL    Calculated Osmolality 293 275 - 295 mOsm/kg    eGFR-Cr 61 >=60 mL/min/1.73m2    AST 62 (H) <34 U/L    ALT 80 (H) 10 - 49 U/L    Alkaline Phosphatase 234 (H) 55 - 142 U/L    Bilirubin, Total 0.5 0.2 - 1.1 mg/dL    Total Protein 7.0 5.7 - 8.2 g/dL    Albumin 4.4 3.2 - 4.8 g/dL    Globulin  2.6 2.0 - 3.5 g/dL    A/G Ratio 1.7 1.0 - 2.0    Patient Fasting for CMP? No    CA 27.29 [E]    Collection Time: 09/13/24 11:13 AM   Result Value Ref Range    Breast Carcinoma Ag [id number] 73.4 (H) <38.0 u/mL   CANCER ANTIGEN (CA) 15-3 [E]    Collection Time: 09/13/24 11:13 AM   Result Value Ref Range    Cancer Ag 15-3 47.1 (H) <=32.4 U/mL       Lab Results   Component Value Date     47.1 (H) 09/13/2024     42.9 (H) 08/16/2024     47.2 (H) 07/19/2024     55.6 (H) 06/14/2024     60.3 (H) 05/17/2024       ASSESSMENT AND PLAN:     # Metastatic breast carcinoma: Diag 9/23, bone only disease.  Bx proven involvement T spine.  ER/CT +, Her 2 neg.     Letrozole started 9/23.   Abemaciclib started 10/23.  Tumor markers improving.  PET scan 3/24 shows radiologic improvement although she still has active disease in spine.  Continue current treatment.  Restage.    # Bone metastasis: Started zoledronic acid 10/23, denosumab not covered.  MRI spine 7/24 showed evidence of similar metastatic disease in C-spine, there was possible trace epidural tumor at C6-C7, has received RT to C6.  T-spine showed favorable response with improvement, L-spine also showed improvement though persistent tumor was noted.  Continue present management.  On monthly zoledronic acid, change to o q 2 months going forward.    # Diarrhea: Due to abemaciclib.  Consider reducing to 100 mg twice daily if worse, she is better now.     # Severe back pain: Received RT to C-spine.  MRI of entire spine 7/24 does not show any new areas of disease.      # Abnormal LFTs: US liver-cholelithiasis, no acute cholecystitis.  HIDA scan did not show any cholecystitis.  Referred to surgery.    # Headache: MRI brain 12/23 negative.    # Neutropenia: Due to abemaciclib.  Stable.    # Gallstones: seeing surgery.     ORDERS PLACED:        Procedures    PET STANDARD BODY SCAN (ONCOLOGY) (CPT=78815)     Return in about 2 months (around 11/13/2024) for Labs, MD visit, Infusion.    Michelle Reinoso M.D.    [facility]    [address] [place], [address]    9/13/2024

## 2024-09-13 ENCOUNTER — OFFICE VISIT (OUTPATIENT)
Dept: HEMATOLOGY/ONCOLOGY | Facility: HOSPITAL | Age: 69
End: 2024-09-13
Attending: INTERNAL MEDICINE
Payer: MEDICARE

## 2024-09-13 VITALS
TEMPERATURE: 99 F | DIASTOLIC BLOOD PRESSURE: 84 MMHG | HEART RATE: 63 BPM | WEIGHT: 117 LBS | SYSTOLIC BLOOD PRESSURE: 156 MMHG | BODY MASS INDEX: 21.26 KG/M2 | OXYGEN SATURATION: 100 % | RESPIRATION RATE: 16 BRPM | HEIGHT: 62.01 IN

## 2024-09-13 DIAGNOSIS — R79.89 ELEVATED LFTS: ICD-10-CM

## 2024-09-13 DIAGNOSIS — K52.1 DIARRHEA DUE TO DRUG: ICD-10-CM

## 2024-09-13 DIAGNOSIS — C79.51 PAIN FROM BONE METASTASES (HCC): ICD-10-CM

## 2024-09-13 DIAGNOSIS — G89.3 PAIN FROM BONE METASTASES (HCC): ICD-10-CM

## 2024-09-13 DIAGNOSIS — R79.89 ABNORMAL LFTS: ICD-10-CM

## 2024-09-13 DIAGNOSIS — C50.919 CARCINOMA OF BREAST METASTATIC TO MULTIPLE SITES, UNSPECIFIED LATERALITY (HCC): ICD-10-CM

## 2024-09-13 DIAGNOSIS — G89.3 PAIN FROM BONE METASTASES (HCC): Primary | ICD-10-CM

## 2024-09-13 DIAGNOSIS — C79.51 PAIN FROM BONE METASTASES (HCC): Primary | ICD-10-CM

## 2024-09-13 DIAGNOSIS — C50.919 CARCINOMA OF BREAST METASTATIC TO MULTIPLE SITES, UNSPECIFIED LATERALITY (HCC): Primary | ICD-10-CM

## 2024-09-13 DIAGNOSIS — C79.51 CANCER, METASTATIC TO BONE (HCC): ICD-10-CM

## 2024-09-13 LAB
ALBUMIN SERPL-MCNC: 4.4 G/DL (ref 3.2–4.8)
ALBUMIN/GLOB SERPL: 1.7 {RATIO} (ref 1–2)
ALP LIVER SERPL-CCNC: 234 U/L
ALT SERPL-CCNC: 80 U/L
ANION GAP SERPL CALC-SCNC: 7 MMOL/L (ref 0–18)
AST SERPL-CCNC: 62 U/L (ref ?–34)
BASOPHILS # BLD AUTO: 0.05 X10(3) UL (ref 0–0.2)
BASOPHILS NFR BLD AUTO: 1.7 %
BILIRUB SERPL-MCNC: 0.5 MG/DL (ref 0.2–1.1)
BRCA1 C.185DELAG BLD/T QL: 73.4 U/ML (ref ?–38)
BUN BLD-MCNC: 14 MG/DL (ref 9–23)
CALCIUM BLD-MCNC: 10 MG/DL (ref 8.7–10.4)
CANCER AG15-3 SERPL-ACNC: 47.1 U/ML (ref ?–32.4)
CHLORIDE SERPL-SCNC: 106 MMOL/L (ref 98–112)
CO2 SERPL-SCNC: 27 MMOL/L (ref 21–32)
CREAT BLD-MCNC: 1 MG/DL
EGFRCR SERPLBLD CKD-EPI 2021: 61 ML/MIN/1.73M2 (ref 60–?)
EOSINOPHIL # BLD AUTO: 0.07 X10(3) UL (ref 0–0.7)
EOSINOPHIL NFR BLD AUTO: 2.4 %
ERYTHROCYTE [DISTWIDTH] IN BLOOD BY AUTOMATED COUNT: 13.4 %
FASTING STATUS PATIENT QL REPORTED: NO
GLOBULIN PLAS-MCNC: 2.6 G/DL (ref 2–3.5)
GLUCOSE BLD-MCNC: 149 MG/DL (ref 70–99)
HCT VFR BLD AUTO: 35.3 %
HGB BLD-MCNC: 12.4 G/DL
IMM GRANULOCYTES # BLD AUTO: 0.01 X10(3) UL (ref 0–1)
IMM GRANULOCYTES NFR BLD: 0.3 %
LYMPHOCYTES # BLD AUTO: 0.89 X10(3) UL (ref 1–4)
LYMPHOCYTES NFR BLD AUTO: 30.4 %
MCH RBC QN AUTO: 35.3 PG (ref 26–34)
MCHC RBC AUTO-ENTMCNC: 35.1 G/DL (ref 31–37)
MCV RBC AUTO: 100.6 FL
MONOCYTES # BLD AUTO: 0.3 X10(3) UL (ref 0.1–1)
MONOCYTES NFR BLD AUTO: 10.2 %
NEUTROPHILS # BLD AUTO: 1.61 X10 (3) UL (ref 1.5–7.7)
NEUTROPHILS # BLD AUTO: 1.61 X10(3) UL (ref 1.5–7.7)
NEUTROPHILS NFR BLD AUTO: 55 %
OSMOLALITY SERPL CALC.SUM OF ELEC: 293 MOSM/KG (ref 275–295)
PLATELET # BLD AUTO: 134 10(3)UL (ref 150–450)
POTASSIUM SERPL-SCNC: 4 MMOL/L (ref 3.5–5.1)
PROT SERPL-MCNC: 7 G/DL (ref 5.7–8.2)
RBC # BLD AUTO: 3.51 X10(6)UL
SODIUM SERPL-SCNC: 140 MMOL/L (ref 136–145)
WBC # BLD AUTO: 2.9 X10(3) UL (ref 4–11)

## 2024-09-13 PROCEDURE — G2211 COMPLEX E/M VISIT ADD ON: HCPCS | Performed by: INTERNAL MEDICINE

## 2024-09-13 PROCEDURE — 96365 THER/PROPH/DIAG IV INF INIT: CPT

## 2024-09-13 PROCEDURE — 99212 OFFICE O/P EST SF 10 MIN: CPT | Performed by: INTERNAL MEDICINE

## 2024-09-13 NOTE — PROGRESS NOTES
[facility] Pharmacy Note:  Renal Dose Adjustment for Zometa (Zoledronic acid)    Marija Elaine is a 69 year old female has been prescribed Zometa (Zoledronic acid)  4 mg IVPB once.    LABS:   Lab Results   Component Value Date/Time    CREATSERUM 1.00 09/13/2024 11:13 AM    BUN 14 09/13/2024 11:13 AM         CrCl: Estimated Creatinine Clearance: 41.4 mL/min (based on SCr of 1 mg/dL).    Calculated creatinine clearance is 40 to 49 mL/minute therefore, the dose of Zometa (Zoledronic acid) has been changed to 3.3 mg IVPB once per P&T approved protocol.     Thank you,  Viviana Adams, PharmD  9/13/2024 12:09 PM

## 2024-09-13 NOTE — PROGRESS NOTES
Education Record    Learner:  Patient    Disease / Diagnosis: MBC    Barriers / Limitations:  None   Comments:    Method:  Brief focused   Comments:    General Topics:  Plan of care reviewed   Comments:    Outcome:  Shows understanding   Comments:    Zometa infusion - tolerated well without issue. Receiving now every 2 months. Next appt made. Discharged in stable condition.

## 2024-09-24 ENCOUNTER — OFFICE VISIT (OUTPATIENT)
Facility: LOCATION | Age: 69
End: 2024-09-24
Payer: MEDICARE

## 2024-09-24 VITALS — TEMPERATURE: 98 F | SYSTOLIC BLOOD PRESSURE: 142 MMHG | HEART RATE: 58 BPM | DIASTOLIC BLOOD PRESSURE: 76 MMHG

## 2024-09-24 DIAGNOSIS — K80.12 CALCULUS OF GALLBLADDER WITH ACUTE ON CHRONIC CHOLECYSTITIS WITHOUT OBSTRUCTION: Primary | ICD-10-CM

## 2024-09-24 PROCEDURE — 99204 OFFICE O/P NEW MOD 45 MIN: CPT | Performed by: SURGERY

## 2024-09-24 NOTE — H&P
New Patient Visit Note       Active Problems      1. Calculus of gallbladder with acute on chronic cholecystitis without obstruction        Chief Complaint   Chief Complaint   Patient presents with    New Patient     NP-Gallbladder consult, referred by Dr. Reinoso         History of Present Illness     Marija is a 69-year-old female who was referred by Dr. Reinoso for evaluation of gallstones and elevated LFTs.    The patient has a significant past medical history of left breast cancer diagnosed in 2006 status post left breast lumpectomy, radiation therapy and tamoxifen X 5 years.  She was found to have diffuse osseous metastatic disease in September 2023. She was started on Letrozole and abemaciclib.     She states the abemaciclib causes her to have an upset stomach and nausea.  She has been taking this medication for about 1 year and experiences these symptoms every morning.    The patient undergoes monthly labs.  In April 2024, her alkaline phosphatase was elevated at 188. Her repeat CMP in May showed elevated LFTs and they continue to uptrend.     She was sent for an ultrasound which was completed on 6/15/2024.  This revealed cholelithiasis without definite evidence of acute cholecystitis.  The gallbladder was distended.  Common bile duct is minimally prominent in size, however there is no intrahepatic biliary ductal dilation.    She presents today to discuss elective laparoscopic cholecystectomy.  She does report intermittent episodes of epigastric and right upper quadrant abdominal pain.  She denies associated abdominal bloating.  She continues to experience mild nausea which she attributes to her medication.  She also reports some episodes of urea for which she takes Imodium as needed.  She denies fevers or chills.    She denies significant past abdominal surgical history. She has a significant family history of cholelithiasis going back 4 generations. She denies taking blood thinning medications.    I discussed  the risk, benefits, alternatives of surgery.  I discussed the anticipated postoperative recovery including dietary, activity, exercise, and lifestyle recommendations.  The patient's questions regarding surgical procedure were answered and the patient voiced understanding of the care plan.    Allergies  Marija is allergic to levofloxacin.    Past Medical / Surgical / Social / Family History    The past medical and past surgical history have been reviewed by me today.    Past Medical History:    Anxiety state    Breast cancer (HCC)    Essential hypertension    Exposure to medical diagnostic radiation    Gall stones    monitored by PCP    High blood pressure     Past Surgical History:   Procedure Laterality Date    Colonoscopy  2016    Lumpectomy left      Sent lymph node biopsy         The family history and social history have been reviewed by me today.    History reviewed. No pertinent family history.  Social History     Socioeconomic History    Marital status:    Tobacco Use    Smoking status: Never    Smokeless tobacco: Never   Vaping Use    Vaping status: Never Used   Substance and Sexual Activity    Alcohol use: Yes     Alcohol/week: 14.0 standard drinks of alcohol     Types: 14 Glasses of wine per week     Comment: daily    Drug use: Never        Current Outpatient Medications:     letrozole 2.5 MG Oral Tab, Take 1 tablet (2.5 mg total) by mouth daily., Disp: 30 tablet, Rfl: 3    Abemaciclib 150 MG Oral Tab, Take 1 tablet (150 mg total) by mouth 2 (two) times daily. may take with or without food, Disp: 60 tablet, Rfl: 6    metoprolol succinate ER 25 MG Oral Tablet 24 Hr, Take 1 tablet (25 mg total) by mouth daily., Disp: , Rfl:     Multiple Vitamin Oral Tab, Take 1 tablet by mouth every other day., Disp: , Rfl:     ALPRAZolam 0.25 MG Oral Tab, Take 1 tablet (0.25 mg total) by mouth As Directed., Disp: , Rfl:     Ibuprofen 200 MG Oral Cap, Take by mouth as needed., Disp: , Rfl:     valsartan 160 MG Oral  Tab, Take 1 tablet (160 mg total) by mouth daily., Disp: , Rfl:     HYDROcodone-acetaminophen  MG Oral Tab, , Disp: , Rfl:       Review of Systems  The Review of Systems has been reviewed by me during today.  Review of Systems   Constitutional: Negative.    HENT: Negative.     Eyes: Negative.    Respiratory: Negative.     Cardiovascular: Negative.    Gastrointestinal: Negative.    Genitourinary: Negative.    Musculoskeletal: Negative.    Skin: Negative.    Neurological: Negative.    Psychiatric/Behavioral: Negative.         Physical Findings   /76 (BP Location: Left arm, Patient Position: Sitting)   Pulse 58   Temp 97.9 °F (36.6 °C)   Physical Exam  Vitals and nursing note reviewed.   Constitutional:       General: She is not in acute distress.     Appearance: Normal appearance. She is well-developed.   HENT:      Head: Normocephalic and atraumatic.   Eyes:      General: No scleral icterus.     Conjunctiva/sclera: Conjunctivae normal.   Neck:      Trachea: No tracheal deviation.   Cardiovascular:      Rate and Rhythm: Normal rate and regular rhythm.      Heart sounds: S1 normal and S2 normal. No murmur heard.  Pulmonary:      Effort: No accessory muscle usage or respiratory distress.      Breath sounds: No decreased breath sounds, wheezing, rhonchi or rales.   Abdominal:      General: There is no distension or abdominal bruit.      Palpations: Abdomen is soft. Abdomen is not rigid. There is no shifting dullness, fluid wave, hepatomegaly, splenomegaly, mass or pulsatile mass.      Tenderness: There is abdominal tenderness in the right upper quadrant. There is no guarding or rebound. Negative signs include Snell's sign and McBurney's sign.      Hernia: There is no hernia in the umbilical area or ventral area.       Skin:     General: Skin is warm and dry.   Neurological:      Mental Status: She is alert and oriented to person, place, and time.   Psychiatric:         Speech: Speech normal.          Behavior: Behavior normal.         Thought Content: Thought content normal.         Judgment: Judgment normal.             Assessment   1. Calculus of gallbladder with acute on chronic cholecystitis without obstruction          Plan     The patient will be scheduled for laparoscopic cholecystectomy with ICG fluorescent imaging.    The lamar-operative care plan was discussed with the patient, who voices understanding.  Activity and lifting recommendations were discussed in length.     The risks, benefits, and alternatives to the procedure were explained to the patient.  The risks explained include, but are not limited to, bleeding, infection, pain wound complications, recurrence, incorrect diagnosis, injury to adjacent organs and structures. We also discussed the possibile need for further therapeutic, diagnostic, or surgical intervention.  The patient voiced understanding, and after all questions were answered to the patient's satisfaction, the patient provided willing and informed consent to proceed.     No orders of the defined types were placed in this encounter.      Imaging & Referrals   None    Follow Up  No follow-ups on file.    Cortez Bocanegra MD

## 2024-10-07 ENCOUNTER — HOSPITAL ENCOUNTER (OUTPATIENT)
Dept: NUCLEAR MEDICINE | Facility: HOSPITAL | Age: 69
Discharge: HOME | End: 2024-10-07
Attending: INTERNAL MEDICINE
Payer: MEDICARE

## 2024-10-07 DIAGNOSIS — C79.51 CANCER, METASTATIC TO BONE (HCC): ICD-10-CM

## 2024-10-07 DIAGNOSIS — R79.89 ELEVATED LFTS: ICD-10-CM

## 2024-10-07 DIAGNOSIS — C50.919 CARCINOMA OF BREAST METASTATIC TO MULTIPLE SITES, UNSPECIFIED LATERALITY (HCC): ICD-10-CM

## 2024-10-07 DIAGNOSIS — C79.51 PAIN FROM BONE METASTASES (HCC): ICD-10-CM

## 2024-10-07 DIAGNOSIS — K52.1 DIARRHEA DUE TO DRUG: ICD-10-CM

## 2024-10-07 DIAGNOSIS — R79.89 ABNORMAL LFTS: ICD-10-CM

## 2024-10-07 DIAGNOSIS — G89.3 PAIN FROM BONE METASTASES (HCC): ICD-10-CM

## 2024-10-07 LAB — GLUCOSE BLD-MCNC: 82 MG/DL (ref 70–99)

## 2024-10-07 PROCEDURE — 82962 GLUCOSE BLOOD TEST: CPT

## 2024-10-07 PROCEDURE — 78815 PET IMAGE W/CT SKULL-THIGH: CPT | Performed by: INTERNAL MEDICINE

## 2024-10-22 PROBLEM — C50.919 CARCINOMA OF BREAST METASTATIC TO BONE (HCC): Status: ACTIVE | Noted: 2023-09-23

## 2024-10-22 PROBLEM — C79.51 CARCINOMA OF BREAST METASTATIC TO BONE (HCC): Status: ACTIVE | Noted: 2023-09-23

## 2024-10-31 ENCOUNTER — TELEPHONE (OUTPATIENT)
Facility: LOCATION | Age: 69
End: 2024-10-31

## 2024-10-31 NOTE — TELEPHONE ENCOUNTER
OLU HERNANDEZ Patient  Member ID  [id number]    Date of Birth  1955-03-26    Gender  Female    Eligibility Status  Active Coverage    Group Number  [id number]    Plan / Coverage Date  2024-01-01    Transaction Type  Outpatient Authorization    Organization  [facility]    Payer  [employer] (COMMERCIAL & MEDICARE)    [employer] logo  Transaction ID: Not FoundCustomer ID: [id number] Date: NA  No Authorization Required  Place of Service  [facility]    Service From - To Date  NA    Admission Type  9    Diagnosis Code 1   - Calculus of gallbladder w chronic cholecyst w/o obstruction    Procedure Code 1  65228    Quantity  1 Units    Procedure From - To Date  2024-11-15    Status  NO AUTH REQUIRED    Message  PRECERT IS NOT REQUIRED OR ONLY REQUIRED IN UNIQUE CIRCUMSTANCES FOR MEDICAID REFER TO PRIOR AUTH TOOL ON [employer] FOR ALL OTHERS REFER TO CODE SEARCH TOOL ON [employer] COVERAGE OF SERVICES ARE SUBJECT TO BENEFITS AND ELIGIBILITY

## 2024-11-05 NOTE — PROGRESS NOTES
[facility] Progress Note      Patient Name:  Marija Elaine  YOB: 1955  Medical Record Number:  [id number]    Date of visit:  2024    CHIEF COMPLAINT: Metastatic breast carcinoma.    ONCOLOGY HISTORY:    Bone metastasis .  Biopsy T-spine -metastatic breast carcinoma.  Letrozole -  Abemaciclib 10/23-  RT C6 10/23-    HPI:     69 year old female that I have followed since  after w/u of back pain and bone scan -multiple skeletal metastasis.  MRI C-spine -metastatic disease at C6 with epidural tumor.  H/o L breast ca  s/p L lumpectomy, RT, tamoxifen x 5 years.    Letrozole started .  MRI T-spine -diffuse osseous metastatic disease, most pronounced T2-T5.   Mild epidural extension of tumor along T4, T5, T8.  PET scan -multiple areas of osseous metastatic disease.    CT biopsy T-spine -metastatic breast carcinoma, 50% ER, 25% NH, HER2 2+ by IHC, FISH pending.  Ki-67 10%.  Abemaciclib started 10/23.      US abdomen  for elevated LFTs showed cholelithiasis.  Distended gallbladder.  HIDA scan did not show any acute cholecystitis. Upcoming appt with surgeons.     Bit more achy.     SOCIAL HISTORY:    , lives in [place], .  2 sons in [place].  Older is  for spinal cord stimulator implants.  He has a 5-year-old child.  Younger son has a 2-year-old child and .    ROS:     Constitutional: no fever, chills fatigue,night sweats or weight loss  Neurologic: no headache, seizures, diplopia or weakness  Respiratory: no cough, SOB or hemoptysis  Cardiovascular: no chest pain, ankle swelling, RIOS  GI: no nausea, vomiting, diarrhea or BRBPR  Musculoskeletal: T-spine pain  Dermatologic: no alopecia, rash, pruritis  : no hematuria, dysuria or frequency  Psych: no confusion or depression   Heme: no easy bruising or bleeding     ALLERGIES:    Allergies   Allergen Reactions    Levofloxacin HIVES      Levaquin         MEDICATIONS:    Current Outpatient Medications   Medication Sig Dispense Refill    letrozole 2.5 MG Oral Tab Take 1 tablet (2.5 mg total) by mouth daily. 30 tablet 3    Abemaciclib 150 MG Oral Tab Take 1 tablet (150 mg total) by mouth 2 (two) times daily. may take with or without food 60 tablet 6    metoprolol succinate ER 25 MG Oral Tablet 24 Hr Take 1 tablet (25 mg total) by mouth daily.      HYDROcodone-acetaminophen  MG Oral Tab  (Patient not taking: Reported on 2024)      ALPRAZolam 0.25 MG Oral Tab Take 1 tablet (0.25 mg total) by mouth As Directed.      valsartan 160 MG Oral Tab Take 1 tablet (160 mg total) by mouth daily.         VITALS:  Height: 157.5 cm (5' 2.01\") (1101)  Weight: 54.9 kg (121 lb) (1101)  BSA (Calculated - sq m): 1.54 sq meters (1101)  Pulse: 69 (1101)  BP: 138/81 (1101)  Temp: 97.7 °F (36.5 °C) (1101)  Do Not Use - Resp Rate: --  SpO2: 99 % (1101)    PS:  ECO    PHYSICAL EXAM:    General: alert and oriented x 3, not in acute distress.  Accompanied by .  HEENT: CAROL, oropharynx  clear.  Neck: supple.  No JVD /adenopathy.  CVS: S1S2, regular  Rhythm, no murmurs.   Lungs: Clear to auscultation and percussion.  Abdomen: soft, no masses  Extremities:  No edema.  CNS: no focal deficit    Emotional well being: Patient's emotional well being was assessed.  No issues requiring acute psychosocial intervention were identified.     IMAGING:    LABS:     Recent Results (from the past 24 hours)   CBC With Differential With Platelet    Collection Time: 24 11:01 AM   Result Value Ref Range    WBC 3.4 (L) 4.0 - 11.0 x10(3) uL    RBC 3.78 (L) 3.80 - 5.30 x10(6)uL    HGB 13.6 12.0 - 16.0 g/dL    HCT 39.1 35.0 - 48.0 %    .0 150.0 - 450.0 10(3)uL    .4 (H) 80.0 - 100.0 fL    MCH 36.0 (H) 26.0 - 34.0 pg    MCHC 34.8 31.0 - 37.0 g/dL    RDW 12.5 %    Neutrophil Absolute Prelim 1.88 1.50 - 7.70 x10 (3) uL     Neutrophil Absolute 1.88 1.50 - 7.70 x10(3) uL    Lymphocyte Absolute 1.04 1.00 - 4.00 x10(3) uL    Monocyte Absolute 0.35 0.10 - 1.00 x10(3) uL    Eosinophil Absolute 0.07 0.00 - 0.70 x10(3) uL    Basophil Absolute 0.08 0.00 - 0.20 x10(3) uL    Immature Granulocyte Absolute 0.01 0.00 - 1.00 x10(3) uL    Neutrophil % 54.9 %    Lymphocyte % 30.3 %    Monocyte % 10.2 %    Eosinophil % 2.0 %    Basophil % 2.3 %    Immature Granulocyte % 0.3 %   Comp Metabolic Panel (14)    Collection Time: 11/08/24 11:01 AM   Result Value Ref Range    Glucose 77 70 - 99 mg/dL    Sodium 141 136 - 145 mmol/L    Potassium 3.9 3.5 - 5.1 mmol/L    Chloride 105 98 - 112 mmol/L    CO2 31.0 21.0 - 32.0 mmol/L    Anion Gap 5 0 - 18 mmol/L    BUN 14 9 - 23 mg/dL    Creatinine 0.93 0.55 - 1.02 mg/dL    Calcium, Total 9.7 8.7 - 10.4 mg/dL    Calculated Osmolality 291 275 - 295 mOsm/kg    eGFR-Cr 66 >=60 mL/min/1.73m2    AST 73 (H) <34 U/L     (H) 10 - 49 U/L    Alkaline Phosphatase 264 (H) 55 - 142 U/L    Bilirubin, Total 0.6 0.2 - 1.1 mg/dL    Total Protein 6.9 5.7 - 8.2 g/dL    Albumin 4.7 3.2 - 4.8 g/dL    Globulin  2.2 2.0 - 3.5 g/dL    A/G Ratio 2.1 (H) 1.0 - 2.0    Patient Fasting for CMP? Patient not present    CA 27.29 [E]    Collection Time: 11/08/24 11:01 AM   Result Value Ref Range    Breast Carcinoma Ag [id number] 71.7 (H) <38.0 u/mL   CANCER ANTIGEN (CA) 15-3 [E]    Collection Time: 11/08/24 11:01 AM   Result Value Ref Range    Cancer Ag 15-3 46.9 (H) <=32.4 U/mL   EKG 12 Lead    Collection Time: 11/08/24  1:26 PM   Result Value Ref Range    Ventricular rate 57 BPM    Atrial rate 57 BPM    P-R Interval 138 ms    QRS Duration 78 ms    Q-T Interval 434 ms    QTC Calculation (Bezet) 422 ms    P Axis 55 degrees    R Axis 68 degrees    T Axis 55 degrees         Lab Results   Component Value Date     46.9 (H) 11/08/2024     47.1 (H) 09/13/2024     42.9 (H) 08/16/2024     47.2 (H) 07/19/2024     55.6 (H)  06/14/2024       ASSESSMENT AND PLAN:     # Metastatic breast carcinoma: Diag 9/23, bone only disease.  Bx proven involvement T spine.  ER/CA +, Her 2 neg.     Letrozole started 9/23.  Abemaciclib started 10/23.  Tumor markers improving.  PET scan 10/24 showed increase in hypermetabolic some T3/T4 but other areas looked better.  Continue current management.      # Bone metastasis: Started zoledronic acid 10/23, denosumab not covered.  MRI spine 7/24 showed evidence of similar metastatic disease in C-spine, there was possible trace epidural tumor at C6-C7, has received RT to C6.  T-spine showed favorable response with improvement, L-spine also showed improvement though persistent tumor was noted.  Continue present management.  On monthly zoledronic acid, changed to o q 2 months 9/24. Uptodate on dental exams.    # Diarrhea: Due to abemaciclib.  Consider reducing to 100 mg twice daily if worse, she is better now.     # Severe back pain: Received RT to C-spine.  MRI of entire spine 7/24 does not show any new areas of disease.      # Abnormal LFTs: US liver-cholelithiasis, no acute cholecystitis.  Saw surgery and laparoscopic cholecystectomy scheduled for next week.    # Headache: MRI brain 12/23 negative.    # Neutropenia: Due to abemaciclib.  Stable.      ORDERS PLACED:    Return in about 2 months (around 1/8/2025) for Labs, MD visit, Infusion.    Michelle Reinoso M.D.    [facility]    [address] [place], [address]    11/8/2024

## 2024-11-08 ENCOUNTER — EKG ENCOUNTER (OUTPATIENT)
Dept: LAB | Facility: HOSPITAL | Age: 69
End: 2024-11-08
Attending: SURGERY
Payer: MEDICARE

## 2024-11-08 ENCOUNTER — OFFICE VISIT (OUTPATIENT)
Dept: HEMATOLOGY/ONCOLOGY | Facility: HOSPITAL | Age: 69
End: 2024-11-08
Attending: INTERNAL MEDICINE
Payer: MEDICARE

## 2024-11-08 VITALS
WEIGHT: 121 LBS | HEIGHT: 62.01 IN | DIASTOLIC BLOOD PRESSURE: 81 MMHG | HEART RATE: 69 BPM | SYSTOLIC BLOOD PRESSURE: 138 MMHG | TEMPERATURE: 98 F | OXYGEN SATURATION: 99 % | BODY MASS INDEX: 21.98 KG/M2

## 2024-11-08 DIAGNOSIS — G89.3 PAIN FROM BONE METASTASES (HCC): Primary | ICD-10-CM

## 2024-11-08 DIAGNOSIS — G89.3 PAIN FROM BONE METASTASES (HCC): ICD-10-CM

## 2024-11-08 DIAGNOSIS — R79.89 ELEVATED LFTS: ICD-10-CM

## 2024-11-08 DIAGNOSIS — Z01.818 PRE-OP TESTING: ICD-10-CM

## 2024-11-08 DIAGNOSIS — C79.51 PAIN FROM BONE METASTASES (HCC): ICD-10-CM

## 2024-11-08 DIAGNOSIS — C50.919 CARCINOMA OF BREAST METASTATIC TO MULTIPLE SITES, UNSPECIFIED LATERALITY (HCC): Primary | ICD-10-CM

## 2024-11-08 DIAGNOSIS — T50.905D ADVERSE EFFECT OF DRUG, SUBSEQUENT ENCOUNTER: ICD-10-CM

## 2024-11-08 DIAGNOSIS — C79.51 CANCER, METASTATIC TO BONE (HCC): ICD-10-CM

## 2024-11-08 DIAGNOSIS — D70.2 NEUTROPENIA, DRUG-INDUCED (HCC): ICD-10-CM

## 2024-11-08 DIAGNOSIS — C79.51 PAIN FROM BONE METASTASES (HCC): Primary | ICD-10-CM

## 2024-11-08 DIAGNOSIS — R79.89 ABNORMAL LFTS: ICD-10-CM

## 2024-11-08 DIAGNOSIS — C50.919 CARCINOMA OF BREAST METASTATIC TO MULTIPLE SITES, UNSPECIFIED LATERALITY (HCC): ICD-10-CM

## 2024-11-08 LAB
ALBUMIN SERPL-MCNC: 4.7 G/DL (ref 3.2–4.8)
ALBUMIN/GLOB SERPL: 2.1 {RATIO} (ref 1–2)
ALP LIVER SERPL-CCNC: 264 U/L
ALT SERPL-CCNC: 109 U/L
ANION GAP SERPL CALC-SCNC: 5 MMOL/L (ref 0–18)
AST SERPL-CCNC: 73 U/L (ref ?–34)
ATRIAL RATE: 57 BPM
BASOPHILS # BLD AUTO: 0.08 X10(3) UL (ref 0–0.2)
BASOPHILS NFR BLD AUTO: 2.3 %
BILIRUB SERPL-MCNC: 0.6 MG/DL (ref 0.2–1.1)
BRCA1 C.185DELAG BLD/T QL: 71.7 U/ML (ref ?–38)
BUN BLD-MCNC: 14 MG/DL (ref 9–23)
CALCIUM BLD-MCNC: 9.7 MG/DL (ref 8.7–10.4)
CANCER AG15-3 SERPL-ACNC: 46.9 U/ML (ref ?–32.4)
CHLORIDE SERPL-SCNC: 105 MMOL/L (ref 98–112)
CO2 SERPL-SCNC: 31 MMOL/L (ref 21–32)
CREAT BLD-MCNC: 0.93 MG/DL
EGFRCR SERPLBLD CKD-EPI 2021: 66 ML/MIN/1.73M2 (ref 60–?)
EOSINOPHIL # BLD AUTO: 0.07 X10(3) UL (ref 0–0.7)
EOSINOPHIL NFR BLD AUTO: 2 %
ERYTHROCYTE [DISTWIDTH] IN BLOOD BY AUTOMATED COUNT: 12.5 %
GLOBULIN PLAS-MCNC: 2.2 G/DL (ref 2–3.5)
GLUCOSE BLD-MCNC: 77 MG/DL (ref 70–99)
HCT VFR BLD AUTO: 39.1 %
HGB BLD-MCNC: 13.6 G/DL
IMM GRANULOCYTES # BLD AUTO: 0.01 X10(3) UL (ref 0–1)
IMM GRANULOCYTES NFR BLD: 0.3 %
LYMPHOCYTES # BLD AUTO: 1.04 X10(3) UL (ref 1–4)
LYMPHOCYTES NFR BLD AUTO: 30.3 %
MCH RBC QN AUTO: 36 PG (ref 26–34)
MCHC RBC AUTO-ENTMCNC: 34.8 G/DL (ref 31–37)
MCV RBC AUTO: 103.4 FL
MONOCYTES # BLD AUTO: 0.35 X10(3) UL (ref 0.1–1)
MONOCYTES NFR BLD AUTO: 10.2 %
NEUTROPHILS # BLD AUTO: 1.88 X10 (3) UL (ref 1.5–7.7)
NEUTROPHILS # BLD AUTO: 1.88 X10(3) UL (ref 1.5–7.7)
NEUTROPHILS NFR BLD AUTO: 54.9 %
OSMOLALITY SERPL CALC.SUM OF ELEC: 291 MOSM/KG (ref 275–295)
P AXIS: 55 DEGREES
P-R INTERVAL: 138 MS
PLATELET # BLD AUTO: 167 10(3)UL (ref 150–450)
POTASSIUM SERPL-SCNC: 3.9 MMOL/L (ref 3.5–5.1)
PROT SERPL-MCNC: 6.9 G/DL (ref 5.7–8.2)
Q-T INTERVAL: 434 MS
QRS DURATION: 78 MS
QTC CALCULATION (BEZET): 422 MS
R AXIS: 68 DEGREES
RBC # BLD AUTO: 3.78 X10(6)UL
SODIUM SERPL-SCNC: 141 MMOL/L (ref 136–145)
T AXIS: 55 DEGREES
VENTRICULAR RATE: 57 BPM
WBC # BLD AUTO: 3.4 X10(3) UL (ref 4–11)

## 2024-11-08 PROCEDURE — G2211 COMPLEX E/M VISIT ADD ON: HCPCS | Performed by: INTERNAL MEDICINE

## 2024-11-08 PROCEDURE — 96365 THER/PROPH/DIAG IV INF INIT: CPT

## 2024-11-08 PROCEDURE — 99215 OFFICE O/P EST HI 40 MIN: CPT | Performed by: INTERNAL MEDICINE

## 2024-11-08 PROCEDURE — 93010 ELECTROCARDIOGRAM REPORT: CPT | Performed by: INTERNAL MEDICINE

## 2024-11-08 PROCEDURE — 93005 ELECTROCARDIOGRAM TRACING: CPT

## 2024-11-08 NOTE — PROGRESS NOTES
[facility] Pharmacy Note:  Renal Dose Adjustment for Zometa (Zoledronic acid)    Marija Elaine is a 69 year old female has been prescribed Zometa (Zoledronic acid)  4 mg IVPB once.    LABS:   Lab Results   Component Value Date/Time    CREATSERUM 0.93 11/08/2024 11:01 AM    BUN 14 11/08/2024 11:01 AM         CrCl: Estimated Creatinine Clearance: 44.5 mL/min (based on SCr of 0.93 mg/dL).    Calculated creatinine clearance is 40 to 49 mL/minute therefore, the dose of Zometa (Zoledronic acid) has been changed to 3.3 mg IVPB once per P&T approved protocol.     Thank you,  Deborah French [facility]  11/8/2024 12:16 PM

## 2024-11-08 NOTE — PROGRESS NOTES
Pt here for Zometa . Pt denies any issues or concerns.      Ordering Provider: Dr. Reinoso  Order Exp: 9/13/25 - patient will be getting Zometa every 2 months now     Pt tolerated infusion without difficulty or complaint. Reviewed next apt date/time: Yes - 1/3/25 for labs, follow up with MD, Zometa      Education Record  Learner:  Patient  Disease / Diagnosis: Breast Ca with bone mets  Barriers / Limitations:  None  Method:  Brief focused and Reinforcement  General Topics:  Medication, Side effects and symptom management, Plan of care reviewed, and Fall risk and prevention  Outcome:  Shows understanding

## 2024-11-08 NOTE — PROGRESS NOTES
Education Record    Learner:  Patient    Disease / Diagnosis:met breast ca      Barriers / Limitations:  None   Comments:    Method:  Discussion   Comments:    General Topics:  Plan of care reviewed   Comments:    Outcome:  Shows understanding   Comments:   Abemaciclib, letrozole.   Kym dunn scheduled for 11/15, reviewed to hold abemciclib 1 week before and 1 week after.   Feeling a little more aches, but had to switch to tylenol per op from advil.   Managing diarrhea.   [place]

## 2024-11-14 ENCOUNTER — ANESTHESIA EVENT (OUTPATIENT)
Dept: SURGERY | Facility: HOSPITAL | Age: 69
End: 2024-11-14
Payer: MEDICARE

## 2024-11-15 ENCOUNTER — ANESTHESIA (OUTPATIENT)
Dept: SURGERY | Facility: HOSPITAL | Age: 69
End: 2024-11-15
Payer: MEDICARE

## 2024-11-15 ENCOUNTER — HOSPITAL ENCOUNTER (OUTPATIENT)
Facility: HOSPITAL | Age: 69
Setting detail: HOSPITAL OUTPATIENT SURGERY
Discharge: HOME | End: 2024-11-15
Attending: SURGERY | Admitting: SURGERY
Payer: MEDICARE

## 2024-11-15 VITALS
SYSTOLIC BLOOD PRESSURE: 124 MMHG | OXYGEN SATURATION: 97 % | HEIGHT: 62 IN | WEIGHT: 120.63 LBS | BODY MASS INDEX: 22.2 KG/M2 | HEART RATE: 62 BPM | DIASTOLIC BLOOD PRESSURE: 68 MMHG | RESPIRATION RATE: 18 BRPM | TEMPERATURE: 98 F

## 2024-11-15 DIAGNOSIS — G89.18 POSTOPERATIVE PAIN: ICD-10-CM

## 2024-11-15 DIAGNOSIS — Z01.818 PRE-OP TESTING: Primary | ICD-10-CM

## 2024-11-15 DIAGNOSIS — K80.12 CALCULUS OF GALLBLADDER WITH ACUTE ON CHRONIC CHOLECYSTITIS WITHOUT OBSTRUCTION: ICD-10-CM

## 2024-11-15 PROCEDURE — 47562 LAPAROSCOPIC CHOLECYSTECTOMY: CPT | Performed by: SURGERY

## 2024-11-15 PROCEDURE — 47562 LAPAROSCOPIC CHOLECYSTECTOMY: CPT

## 2024-11-15 PROCEDURE — 0FT44ZZ RESECTION OF GALLBLADDER, PERCUTANEOUS ENDOSCOPIC APPROACH: ICD-10-PCS | Performed by: SURGERY

## 2024-11-15 RX ORDER — DIPHENHYDRAMINE HYDROCHLORIDE 50 MG/ML
12.5 INJECTION INTRAMUSCULAR; INTRAVENOUS AS NEEDED
Status: DISCONTINUED | OUTPATIENT
Start: 2024-11-15 | End: 2024-11-15

## 2024-11-15 RX ORDER — ROCURONIUM BROMIDE 10 MG/ML
INJECTION, SOLUTION INTRAVENOUS AS NEEDED
Status: DISCONTINUED | OUTPATIENT
Start: 2024-11-15 | End: 2024-11-15 | Stop reason: SURG

## 2024-11-15 RX ORDER — SODIUM CHLORIDE, SODIUM LACTATE, POTASSIUM CHLORIDE, CALCIUM CHLORIDE 600; 310; 30; 20 MG/100ML; MG/100ML; MG/100ML; MG/100ML
INJECTION, SOLUTION INTRAVENOUS CONTINUOUS
Status: DISCONTINUED | OUTPATIENT
Start: 2024-11-15 | End: 2024-11-15

## 2024-11-15 RX ORDER — ACETAMINOPHEN 500 MG
1000 TABLET ORAL ONCE
Status: DISCONTINUED | OUTPATIENT
Start: 2024-11-15 | End: 2024-11-15 | Stop reason: HOSPADM

## 2024-11-15 RX ORDER — HYDROCODONE BITARTRATE AND ACETAMINOPHEN 5; 325 MG/1; MG/1
2 TABLET ORAL ONCE AS NEEDED
Status: DISCONTINUED | OUTPATIENT
Start: 2024-11-15 | End: 2024-11-15

## 2024-11-15 RX ORDER — METOPROLOL TARTRATE 1 MG/ML
INJECTION, SOLUTION INTRAVENOUS AS NEEDED
Status: DISCONTINUED | OUTPATIENT
Start: 2024-11-15 | End: 2024-11-15 | Stop reason: SURG

## 2024-11-15 RX ORDER — LABETALOL HYDROCHLORIDE 5 MG/ML
5 INJECTION, SOLUTION INTRAVENOUS EVERY 5 MIN PRN
Status: DISCONTINUED | OUTPATIENT
Start: 2024-11-15 | End: 2024-11-15

## 2024-11-15 RX ORDER — ACETAMINOPHEN 500 MG
1000 TABLET ORAL ONCE AS NEEDED
Status: DISCONTINUED | OUTPATIENT
Start: 2024-11-15 | End: 2024-11-15

## 2024-11-15 RX ORDER — HYDROMORPHONE HYDROCHLORIDE 1 MG/ML
INJECTION, SOLUTION INTRAMUSCULAR; INTRAVENOUS; SUBCUTANEOUS
Status: COMPLETED
Start: 2024-11-15 | End: 2024-11-15

## 2024-11-15 RX ORDER — MEPERIDINE HYDROCHLORIDE 25 MG/ML
INJECTION INTRAMUSCULAR; INTRAVENOUS; SUBCUTANEOUS
Status: COMPLETED
Start: 2024-11-15 | End: 2024-11-15

## 2024-11-15 RX ORDER — HEPARIN SODIUM 5000 [USP'U]/ML
5000 INJECTION, SOLUTION INTRAVENOUS; SUBCUTANEOUS ONCE
Status: COMPLETED | OUTPATIENT
Start: 2024-11-15 | End: 2024-11-15

## 2024-11-15 RX ORDER — MEPERIDINE HYDROCHLORIDE 25 MG/ML
12.5 INJECTION INTRAMUSCULAR; INTRAVENOUS; SUBCUTANEOUS AS NEEDED
Status: DISCONTINUED | OUTPATIENT
Start: 2024-11-15 | End: 2024-11-15

## 2024-11-15 RX ORDER — GLYCOPYRROLATE 0.2 MG/ML
INJECTION, SOLUTION INTRAMUSCULAR; INTRAVENOUS AS NEEDED
Status: DISCONTINUED | OUTPATIENT
Start: 2024-11-15 | End: 2024-11-15 | Stop reason: SURG

## 2024-11-15 RX ORDER — ONDANSETRON 2 MG/ML
INJECTION INTRAMUSCULAR; INTRAVENOUS AS NEEDED
Status: DISCONTINUED | OUTPATIENT
Start: 2024-11-15 | End: 2024-11-15 | Stop reason: SURG

## 2024-11-15 RX ORDER — HYDROMORPHONE HYDROCHLORIDE 1 MG/ML
0.4 INJECTION, SOLUTION INTRAMUSCULAR; INTRAVENOUS; SUBCUTANEOUS EVERY 5 MIN PRN
Status: DISCONTINUED | OUTPATIENT
Start: 2024-11-15 | End: 2024-11-15

## 2024-11-15 RX ORDER — OXYCODONE HYDROCHLORIDE 5 MG/1
5 TABLET ORAL EVERY 6 HOURS PRN
Qty: 15 TABLET | Refills: 0 | Status: SHIPPED | OUTPATIENT
Start: 2024-11-15

## 2024-11-15 RX ORDER — SENNA AND DOCUSATE SODIUM 50; 8.6 MG/1; MG/1
1 TABLET, FILM COATED ORAL DAILY
Qty: 30 TABLET | Refills: 0 | Status: SHIPPED | OUTPATIENT
Start: 2024-11-15 | End: 2024-12-15

## 2024-11-15 RX ORDER — EPHEDRINE SULFATE 50 MG/ML
INJECTION INTRAVENOUS AS NEEDED
Status: DISCONTINUED | OUTPATIENT
Start: 2024-11-15 | End: 2024-11-15 | Stop reason: SURG

## 2024-11-15 RX ORDER — DEXAMETHASONE SODIUM PHOSPHATE 4 MG/ML
VIAL (ML) INJECTION AS NEEDED
Status: DISCONTINUED | OUTPATIENT
Start: 2024-11-15 | End: 2024-11-15 | Stop reason: SURG

## 2024-11-15 RX ORDER — HYDROMORPHONE HYDROCHLORIDE 1 MG/ML
0.6 INJECTION, SOLUTION INTRAMUSCULAR; INTRAVENOUS; SUBCUTANEOUS EVERY 5 MIN PRN
Status: DISCONTINUED | OUTPATIENT
Start: 2024-11-15 | End: 2024-11-15

## 2024-11-15 RX ORDER — INDOCYANINE GREEN AND WATER 25 MG
5 KIT INJECTION ONCE
Status: DISCONTINUED | OUTPATIENT
Start: 2024-11-15 | End: 2024-11-15 | Stop reason: HOSPADM

## 2024-11-15 RX ORDER — HYDROMORPHONE HYDROCHLORIDE 1 MG/ML
0.2 INJECTION, SOLUTION INTRAMUSCULAR; INTRAVENOUS; SUBCUTANEOUS EVERY 5 MIN PRN
Status: DISCONTINUED | OUTPATIENT
Start: 2024-11-15 | End: 2024-11-15

## 2024-11-15 RX ORDER — ONDANSETRON 2 MG/ML
4 INJECTION INTRAMUSCULAR; INTRAVENOUS EVERY 6 HOURS PRN
Status: DISCONTINUED | OUTPATIENT
Start: 2024-11-15 | End: 2024-11-15

## 2024-11-15 RX ORDER — METOCLOPRAMIDE HYDROCHLORIDE 5 MG/ML
10 INJECTION INTRAMUSCULAR; INTRAVENOUS EVERY 8 HOURS PRN
Status: DISCONTINUED | OUTPATIENT
Start: 2024-11-15 | End: 2024-11-15

## 2024-11-15 RX ORDER — KETOROLAC TROMETHAMINE 30 MG/ML
INJECTION, SOLUTION INTRAMUSCULAR; INTRAVENOUS AS NEEDED
Status: DISCONTINUED | OUTPATIENT
Start: 2024-11-15 | End: 2024-11-15 | Stop reason: SURG

## 2024-11-15 RX ORDER — HYDROCODONE BITARTRATE AND ACETAMINOPHEN 5; 325 MG/1; MG/1
1 TABLET ORAL ONCE AS NEEDED
Status: DISCONTINUED | OUTPATIENT
Start: 2024-11-15 | End: 2024-11-15

## 2024-11-15 RX ORDER — BUPIVACAINE HYDROCHLORIDE AND EPINEPHRINE 5; 5 MG/ML; UG/ML
INJECTION, SOLUTION EPIDURAL; INTRACAUDAL; PERINEURAL AS NEEDED
Status: DISCONTINUED | OUTPATIENT
Start: 2024-11-15 | End: 2024-11-15 | Stop reason: HOSPADM

## 2024-11-15 RX ORDER — NALOXONE HYDROCHLORIDE 0.4 MG/ML
0.08 INJECTION, SOLUTION INTRAMUSCULAR; INTRAVENOUS; SUBCUTANEOUS AS NEEDED
Status: DISCONTINUED | OUTPATIENT
Start: 2024-11-15 | End: 2024-11-15

## 2024-11-15 RX ORDER — NEOSTIGMINE METHYLSULFATE 1 MG/ML
INJECTION INTRAVENOUS AS NEEDED
Status: DISCONTINUED | OUTPATIENT
Start: 2024-11-15 | End: 2024-11-15 | Stop reason: SURG

## 2024-11-15 RX ORDER — LIDOCAINE HYDROCHLORIDE 10 MG/ML
INJECTION, SOLUTION EPIDURAL; INFILTRATION; INTRACAUDAL; PERINEURAL AS NEEDED
Status: DISCONTINUED | OUTPATIENT
Start: 2024-11-15 | End: 2024-11-15 | Stop reason: SURG

## 2024-11-15 RX ADMIN — SODIUM CHLORIDE, SODIUM LACTATE, POTASSIUM CHLORIDE, CALCIUM CHLORIDE: 600; 310; 30; 20 INJECTION, SOLUTION INTRAVENOUS at 13:03:00

## 2024-11-15 RX ADMIN — ROCURONIUM BROMIDE 10 MG: 10 INJECTION, SOLUTION INTRAVENOUS at 12:28:00

## 2024-11-15 RX ADMIN — ROCURONIUM BROMIDE 40 MG: 10 INJECTION, SOLUTION INTRAVENOUS at 11:51:00

## 2024-11-15 RX ADMIN — ONDANSETRON 4 MG: 2 INJECTION INTRAMUSCULAR; INTRAVENOUS at 12:58:00

## 2024-11-15 RX ADMIN — EPHEDRINE SULFATE 10 MG: 50 INJECTION INTRAVENOUS at 12:37:00

## 2024-11-15 RX ADMIN — GLYCOPYRROLATE 0.8 MG: 0.2 INJECTION, SOLUTION INTRAMUSCULAR; INTRAVENOUS at 13:07:00

## 2024-11-15 RX ADMIN — SODIUM CHLORIDE, SODIUM LACTATE, POTASSIUM CHLORIDE, CALCIUM CHLORIDE: 600; 310; 30; 20 INJECTION, SOLUTION INTRAVENOUS at 11:46:00

## 2024-11-15 RX ADMIN — KETOROLAC TROMETHAMINE 15 MG: 30 INJECTION, SOLUTION INTRAMUSCULAR; INTRAVENOUS at 13:00:00

## 2024-11-15 RX ADMIN — NEOSTIGMINE METHYLSULFATE 5 MG: 1 INJECTION INTRAVENOUS at 13:07:00

## 2024-11-15 RX ADMIN — METOPROLOL TARTRATE 1 MG: 1 INJECTION, SOLUTION INTRAVENOUS at 11:59:00

## 2024-11-15 RX ADMIN — METOPROLOL TARTRATE 0.5 MG: 1 INJECTION, SOLUTION INTRAVENOUS at 13:12:00

## 2024-11-15 RX ADMIN — ROCURONIUM BROMIDE 5 MG: 10 INJECTION, SOLUTION INTRAVENOUS at 12:51:00

## 2024-11-15 RX ADMIN — METOPROLOL TARTRATE 1 MG: 1 INJECTION, SOLUTION INTRAVENOUS at 13:02:00

## 2024-11-15 RX ADMIN — DEXAMETHASONE SODIUM PHOSPHATE 4 MG: 4 MG/ML VIAL (ML) INJECTION at 12:00:00

## 2024-11-15 RX ADMIN — LIDOCAINE HYDROCHLORIDE 50 MG: 10 INJECTION, SOLUTION EPIDURAL; INFILTRATION; INTRACAUDAL; PERINEURAL at 11:51:00

## 2024-11-15 NOTE — ANESTHESIA POSTPROCEDURE EVALUATION
[facility]    Marija Elaine Patient Status:  Hospital Outpatient Surgery   Age/Gender 69 year old female MRN [id number]   Location [facility] SURGERY Attending Cortez Bocanegra MD   Hosp Day # 0 PCP PHYSICIAN NONSTAFF       Anesthesia Post-op Note    LAPAROSCOPIC CHOLECYSTECTOMY    Procedure Summary       Date: 11/15/24 Room / Location:  MAIN OR 12 /  MAIN OR    Anesthesia Start: 1146 Anesthesia Stop: 1316    Procedure: LAPAROSCOPIC CHOLECYSTECTOMY (Abdomen) Diagnosis:       Calculus of gallbladder with acute on chronic cholecystitis without obstruction      (Calculus of gallbladder with acute on chronic cholecystitis without obstruction [K80.12])    Surgeons: Cortez Bocangera MD Anesthesiologist: Chaim Jimenez MD    Anesthesia Type: general ASA Status: 3            Anesthesia Type: general    Vitals Value Taken Time   /88 11/15/24 1320   Temp 97.1 °F (36.2 °C) 11/15/24 1320   Pulse 97 11/15/24 1320   Resp 20 11/15/24 1320   SpO2 99 % 11/15/24 1320       Patient Location: PACU    Anesthesia Type: general    Airway Patency: patent    Postop Pain Control: adequate    Mental Status: mildly sedated but able to meaningfully participate in the post-anesthesia evaluation    Nausea/Vomiting: none    Cardiopulmonary/Hydration status: stable euvolemic    Complications: no apparent anesthesia related complications    Postop vital signs: stable    Comments: Pt awake and alert, breathing comfortably, VSS. Report to REINA Miller.    Dental Exam: Unchanged from Preop    Patient to be discharged from PACU when criteria met.

## 2024-11-15 NOTE — DISCHARGE INSTRUCTIONS
Home Care Instructions  Cholecystectomy  Dr Cortez Bocanegra    MEDICATIONS  For post-operative pain control the medications are usually Vicodin or Norco. These are narcotics and are best taken by starting with one tablet and repeating every four to six hours as needed. If the patient does not feel they need the narcotics they shouldn’t take them. If the pain is severe the patient may take up to two pills every four hours. If a minor supplement is necessary in addition to the narcotics do not overlap with Tylenol (any product with acetaminophen) as both Vicodin and Norco contain Tylenol. Please supplement with Advil (ibuprofen) or Motrin. Please ask your surgeon before resuming blood thinners such as aspirin, plavix or coumadin. All other home medications may be resumed as scheduled. With severe appendicitis antibiotics will also be prescribed. With antibiotics, please watch for rash, facial swelling or severe diarrhea.    DIET  The patient may resume a general diet immediately. This is not a good time to eat excessively. The patient should eat in moderation and stick with foods the patient feels are easy to digest. Avoid high fat foods in the immediate post-operative time period as this may still cause some problems. The patient may eat anything in small amounts but foods rich in dairy products, fatty foods or fried foods may cause an upset stomach for up to six weeks after surgery. There should be no alcohol consumption in the immediate recover time period or within six hours of taking narcotics.    WOUND CARE  The top dressing may be removed the day after surgery. This includes the gauze, tape and band-aids if they are present. Do not remove the steri-strips or butterfly tapes that are white and adherent to the skin. The steri-strips will eventually peel up at the ends and at this point they may be removed. This is usually seven to ten days after surgery. The patient may shower the day after surgery. There is no  need to cover the incisions and all top gauze type dressing should be removed prior to showering. Soap can get on the wounds but do not scrub over the wounds. No hair dye or chemicals of any kind should get in the wounds. Avoid tub baths for two weeks. If visible sutures or staples are present they will be removed in the office by the surgeon or nurse. Most wounds will be closed with dissolving suture underneath the skin. These sutures will dissolve on their own. If a drain is present make sure the patient receives drain care instructions from the nurse prior to discharge. Most patients will not have a drain.    ACTIVITY  Every day the patient should be up, showered and dressed. Each day the patient should be up and around the house. The patient should not lie in bed and should not stay in pajamas. We count on the patient being up, coughing, walking and deep breathing to avoid pneumonia and blood clots in the legs. Once a day the patient should get out of the house and go shopping, go to the mall, the [employer] store, the [employer] or a restaurant. The patient may ride in a car but should not drive the car for at least one week. Patients should be off narcotics for at least 8 hours prior to being a . The average time off work is 10 to 14 days; most adults will be seeing the surgeon prior to returning to work. Students will return to school within 1-5 days after discharge from the hospital but will be off gym, sports, and indoors for recess for one month. Patients may go up and down stairs and lift up to five pounds but no bending, pushing or pulling. Nothing called work or exercise until the follow up visit. No ‘stair-master’, power walking, jogging or workouts until the follow up visit. Patients should seek further activity limits at the time of their appointment.    APPOINTMENT  Please call our office for an appointment within five to ten days of discharge Any fever greater than 100.5, chills, nausea, vomiting,  or severe diarrhea please call out office. If the wound turns red, hot, swollen, becomes increasingly painful, or drains pus call us immediately at [phone]. For life threatening emergencies call 911. For non-emergent care please call our office after 9:30 a.m. Monday through Saturday. The number listed above is out office number, out phone automatically switches to out answering service if we are not there. Please do not use the emergency room for non-urgent care.      Thank you for entrusting me with your care.  [facility]     You received a drug called Toradol which is an Anti Inflammatory at: 1pm  If you are allowed to take Anti inflammatories:    Do not take any Anti Inflammatory like Motrin, Aleve or Ibuprofen until after: 7pm  Please report any suspected allergic reactions or bleeding issues to your doctor

## 2024-11-15 NOTE — ANESTHESIA PROCEDURE NOTES
Airway  Date/Time: 11/15/2024 11:54 AM  Urgency: elective    Airway not difficult    General Information and Staff    Patient location during procedure: OR  Anesthesiologist: Chaim Jimenez MD  Resident/CRNA: Cinthya Rodriguez CRNA  Performed: CRNA   Performed by: Cinthya Rodriguez CRNA  Authorized by: Chaim Jimenez MD      Indications and Patient Condition  Indications for airway management: anesthesia  Spontaneous Ventilation: absent  Sedation level: deep  Preoxygenated: yes  Patient position: sniffing  Mask difficulty assessment: 1 - vent by mask    Final Airway Details  Final airway type: endotracheal airway      Successful airway: ETT  Cuffed: yes   Successful intubation technique: direct laryngoscopy  Facilitating devices/methods: intubating stylet  Endotracheal tube insertion site: oral  Blade: Eh  Blade size: #3  ETT size (mm): 7.0    Cormack-Lehane Classification: grade I - full view of glottis  Placement verified by: capnometry   Measured from: lips  ETT to lips (cm): 21  Number of attempts at approach: 1

## 2024-11-15 NOTE — OPERATIVE REPORT
OPERATIVE REPORT   PREOPERATIVE DIAGNOSIS: Chronic Cholecystitis and cholelithiasis.   POSTOPERATIVE DIAGNOSIS: Chronic Cholecystitis and cholelithiasis.   PROCEDURE PERFORMED: Laparoscopic cholecystectomy with ICG cholangiogram.   ASSISTANT: Ms Hermann PA-C.   ANESTHESIA: General endotracheal anesthesia.   Anesthesiologist.: Chaim Jimenez MD  CRNA.: Cinthya Rodriguez CRNA  BLOOD LOSS: 5 mL.   COMPLICATIONS: None apparent.   FINDINGS:   Chronic inflammation of the gallbladder  2.  Narrow cystic duct; can not accommodate cholangiocatheter    DISPOSITION: The patient was awakened from anesthesia and brought to the recovery room in stable condition having tolerated the procedure without apparent complication. Needle, sponge, and instrument counts were correct at the end of the procedure.  Please note, preprocedure antibiotic and DVT prophylaxis were administered per protocol, and a time-out was performed prior to initiating operation, identifying the correct patient, procedure, and surgeon.   INDICATIONS FOR PROCEDURE: Please review the preprocedural history and physical. Briefly, the patient is a 69 year old female who now presents for cholecystectomy.  The risks, benefits, and alternatives of surgical intervention were explained to the patient in detail including but not limited to bleeding, infection, recurrence, incorrect diagnosis, injury to adjacent organs and structures, and bile duct injury requiring possible immediate or delayed reconstruction potentially by a hepatobiliary surgeon, postoperative bile leak with retained common bile duct stone, the need for post-procedure ERCP as well as the need for further therapeutic diagnostic or surgical intervention. The possibility of conversion to open procedure was also explained to the patient. The patient did voice understanding. Allpertinent questions were answered to the patient's satisfaction after which the patient provided willing and informed consent to  proceed with surgery.   PROCEDURE:   NOTE:: A surgical assistant was absolutely essential to the proper conduct of this case, particularly in the performance of the cholangiogram, as well as the careful dissection necessary in and around the hilum of the gallbladder.  Prior to dividing any structures critical view of safety is obtained confirming clear identification of the cystic duct, cystic artery and the cystic plate per current guidelines.  DESCRIPTION OF PROCEDURE: The patient was brought to the operating room, and, after the induction of general endotracheal anesthesia, the abdomen was prepped and draped in the usual sterile fashion. The umbilicus was grasped and elevated with an Allis clamp and an 11-mm incision was made in the superior aspect of the umbilicus through which a Veress needle was placed. A pneumoperitoneum was established in usual manner. Next, an 11-mm trocar and a 10-mm laparoscope were placed into the abdomen. Brief diagnostic survey revealed no other acute pathology.   Attention was then turned to the right upper quadrant. The patient was positioned with head up, right side up, and three 5-mm incisions were made in the upper abdomen, one in the subxiphoid position and two in the subcostal region, through which 5-mm trocars were placed into the abdomen. The gallbladder was grasped, elevated, and retracted. Dissection was initiated in the triangle of Calot, with the cystic duct and cystic artery being identified superior to the line of Rouviere.  A critical view was obtained demonstrating cystic duct, cystic artery and cystic plate in standard fashion. Next, duct and cystic artery were secured using hemoclips.  The artery and duct were then divided using EndoShears.  The gallbladder was elevated and dissected free from the gallbladder fossa using electrocautery. Once the gallbladder was noted from the gallbladder fossa, it was extracted from the umbilical trocar site and Endo Catch bag and  sent to Pathology for specimen.  Attention was turned to achieve hemostasis on the gallbladder fossa; this was achieved with electrocautery. The right upper quadrant was then copiously irrigated until the effluent fluid was clear.  The previously placed clips on the cystic duct and cystic artery were visualized and inspected; they were well approximated, well situated, and there was no evidence of active bleeding or bile leak. At this point, the pneumoperitoneum was released, and the upper trocars were removed under vision. The laparoscope was removed from the umbilicus, as was the trocar. The fascia at the umbilicus was reapproximated using 0 PDS suture. All skin incisions were cleansed, irrigated, and injected with local anesthetic. Skin incisions were reapproximated using subcuticular 4-0 Monocryl suture.  Dermabond/skin glue was used to seal all the incisions.  The patient was awakened from anesthesia and brought to the recovery room in stable condition, having tolerated the procedure without apparent complication.  Operative findings were discussed with the patient's family immediately upon conclusion of the procedure.         Cortez Bocanegra MD FACS  Trauma Medical Director, [facility] General Surgery    The 21st Century Cures Act makes medical notes like these available to patients in the interest of transparency. Please be advised this is a medical document. Medical documents are intended to carry relevant information, facts as evident, and the clinical opinion of the practitioner. The medical note is intended as peer to peer communication and may appear blunt or direct. It is written in medical language and may contain abbreviations or verbiage that are unfamiliar.    This note was prepared using Dragon Medical voice recognition dictation software. As a result, errors may occur. When identified, these errors have been corrected. While every attempt is made to correct errors during dictation,  discrepancies may still exist.

## 2024-11-15 NOTE — BRIEF OP NOTE
Pre-Operative Diagnosis: Calculus of gallbladder with acute on chronic cholecystitis without obstruction [K80.12]     Post-Operative Diagnosis: Calculus of gallbladder with acute on chronic cholecystitis without obstruction [K80.12]      Procedure Performed:   LAPAROSCOPIC CHOLECYSTECTOMY    Surgeons and Role:     * Cortez Bocanegra MD - Primary    Assistant(s):  PA: Eleanor Mccrary PA; Delilah Arana PA     Surgical Findings: chronic inflammation of gallbladder; narrow cystic duct     Specimen: gallbladder and stones.      Estimated Blood Loss: Blood Output: 5 mL (11/15/2024  1:00 PM)      Dictation Number:      Cortez Bocanegra MD  11/15/2024  1:02 PM

## 2024-11-15 NOTE — H&P
New Patient Visit Note         History of Present Illness     Marija is a 69-year-old female who was referred by Dr. Reinoso for evaluation of gallstones and elevated LFTs.    The patient has a significant past medical history of left breast cancer diagnosed in 2006 status post left breast lumpectomy, radiation therapy and tamoxifen X 5 years.  She was found to have diffuse osseous metastatic disease in September 2023. She was started on Letrozole and abemaciclib.     She states the abemaciclib causes her to have an upset stomach and nausea.  She has been taking this medication for about 1 year and experiences these symptoms every morning.    The patient undergoes monthly labs.  In April 2024, her alkaline phosphatase was elevated at 188. Her repeat CMP in May showed elevated LFTs and they continue to uptrend.     She was sent for an ultrasound which was completed on 6/15/2024.  This revealed cholelithiasis without definite evidence of acute cholecystitis.  The gallbladder was distended.  Common bile duct is minimally prominent in size, however there is no intrahepatic biliary ductal dilation.    She presents today to discuss elective laparoscopic cholecystectomy.  She does report intermittent episodes of epigastric and right upper quadrant abdominal pain.  She denies associated abdominal bloating.  She continues to experience mild nausea which she attributes to her medication.  She also reports some episodes of urea for which she takes Imodium as needed.  She denies fevers or chills.    She denies significant past abdominal surgical history. She has a significant family history of cholelithiasis going back 4 generations. She denies taking blood thinning medications.    I discussed the risk, benefits, alternatives of surgery.  I discussed the anticipated postoperative recovery including dietary, activity, exercise, and lifestyle recommendations.  The patient's questions regarding surgical procedure were answered and  the patient voiced understanding of the care plan.    Allergies  Marija is allergic to levofloxacin.    Past Medical / Surgical / Social / Family History    The past medical and past surgical history have been reviewed by me today.    Past Medical History:    Anxiety state    Breast cancer (HCC)    BREAST CANCER WITH METS, CURRENTLY ON TARGETED THERAPY-ORAL ABEMACICLIB    Essential hypertension    Exposure to medical diagnostic radiation    LAST TREATMENT NOVEMBER 2023    Gall stones    monitored by PCP    Hepatitis    High blood pressure    Neuropathy    LEFT FOOT    Visual impairment    READING GLASSES     Past Surgical History:   Procedure Laterality Date    Colonoscopy  2016    Lumpectomy left      Sent lymph node biopsy         The family history and social history have been reviewed by me today.    History reviewed. No pertinent family history.  Social History     Socioeconomic History    Marital status:    Tobacco Use    Smoking status: Never    Smokeless tobacco: Never   Vaping Use    Vaping status: Never Used   Substance and Sexual Activity    Alcohol use: Yes     Alcohol/week: 14.0 standard drinks of alcohol     Types: 14 Glasses of wine per week     Comment: DAILY    Drug use: Never      No current outpatient medications on file.      Review of Systems  The Review of Systems has been reviewed by me during today.  Review of Systems   Constitutional: Negative.    HENT: Negative.     Eyes: Negative.    Respiratory: Negative.     Cardiovascular: Negative.    Gastrointestinal: Negative.    Genitourinary: Negative.    Musculoskeletal: Negative.    Skin: Negative.    Neurological: Negative.    Psychiatric/Behavioral: Negative.         Physical Findings   /74 (BP Location: Right arm)   Pulse 65   Temp 97.1 °F (36.2 °C) (Temporal)   Resp 16   Ht 62\"   Wt 120 lb 9.6 oz (54.7 kg)   SpO2 100%   BMI 22.06 kg/m²   Physical Exam  Vitals and nursing note reviewed.   Constitutional:       General: She  is not in acute distress.     Appearance: Normal appearance. She is well-developed.   HENT:      Head: Normocephalic and atraumatic.   Eyes:      General: No scleral icterus.     Conjunctiva/sclera: Conjunctivae normal.   Neck:      Trachea: No tracheal deviation.   Cardiovascular:      Rate and Rhythm: Normal rate and regular rhythm.      Heart sounds: S1 normal and S2 normal. No murmur heard.  Pulmonary:      Effort: No accessory muscle usage or respiratory distress.      Breath sounds: No decreased breath sounds, wheezing, rhonchi or rales.   Abdominal:      General: There is no distension or abdominal bruit.      Palpations: Abdomen is soft. Abdomen is not rigid. There is no shifting dullness, fluid wave, hepatomegaly, splenomegaly, mass or pulsatile mass.      Tenderness: There is abdominal tenderness in the right upper quadrant. There is no guarding or rebound. Negative signs include Snell's sign and McBurney's sign.      Hernia: There is no hernia in the umbilical area or ventral area.       Skin:     General: Skin is warm and dry.   Neurological:      Mental Status: She is alert and oriented to person, place, and time.   Psychiatric:         Speech: Speech normal.         Behavior: Behavior normal.         Thought Content: Thought content normal.         Judgment: Judgment normal.               Plan     The patient will be scheduled for laparoscopic cholecystectomy with IOC     The lamar-operative care plan was discussed with the patient, who voices understanding.  Activity and lifting recommendations were discussed in length.     The risks, benefits, and alternatives to the procedure were explained to the patient.  The risks explained include, but are not limited to, bleeding, infection, pain wound complications, recurrence, incorrect diagnosis, injury to adjacent organs and structures. We also discussed the possibile need for further therapeutic, diagnostic, or surgical intervention.  The patient voiced  understanding, and after all questions were answered to the patient's satisfaction, the patient provided willing and informed consent to proceed.       Cortez Bocanegra MD

## 2024-11-15 NOTE — ANESTHESIA PREPROCEDURE EVALUATION
PRE-OP EVALUATION    Patient Name: Mraija Elaine    Admit Diagnosis: Calculus of gallbladder with acute on chronic cholecystitis without obstruction [K80.12]    Pre-op Diagnosis: Calculus of gallbladder with acute on chronic cholecystitis without obstruction [K80.12]    LAPAROSCOPIC CHOLECYSTECTOMY POSSIBLE OPEN WITH INJECTION OF INDOCYANINE GREEN    Anesthesia Procedure: LAPAROSCOPIC CHOLECYSTECTOMY POSSIBLE OPEN WITH INJECTION OF INDOCYANINE GREEN    Surgeons and Role:     * Cortez Bocanegra MD - Primary    Pre-op vitals reviewed.  Temp: 97.1 °F (36.2 °C)  Pulse: 65  Resp: 16  BP: 153/74  SpO2: 100 %  Body mass index is 22.06 kg/m².    Current medications reviewed.  Hospital Medications:   acetaminophen (Tylenol Extra Strength) tab 1,000 mg  1,000 mg Oral Once    lactated ringers infusion   Intravenous Continuous    [COMPLETED] heparin (Porcine) 5000 UNIT/ML injection 5,000 Units  5,000 Units Subcutaneous Once    indocyanine green (IC-Green) injection 5 mg  5 mg Intravenous Once    ceFAZolin (Ancef) 2g in 10mL IV syringe premix  2 g Intravenous Once       Outpatient Medications:   Prescriptions Prior to Admission[1]    Allergies: Levofloxacin      Anesthesia Evaluation    Patient summary reviewed.    Anesthetic Complications  (-) history of anesthetic complications         GI/Hepatic/Renal                (+) liver disease                 Cardiovascular    Negative cardiovascular ROS.    Exercise tolerance: good     MET: >4      (+) hypertension and well controlled                                    Endo/Other                                  Pulmonary                           Neuro/Psych                              Metastatic breast carcinoma.  As per Epic:  Patient Active Problem List:     Allergic rhinitis     Breast disorder     Cervical strain     Dvrtclos of lg int w/o perforation or abscess w/o bleeding     Malignant neoplasm of lower-outer quadrant of female breast (HCC)     Migraine with aura      Mixed hyperlipidemia     Osteopenia     Palpitations     Primary hypertension     Breast carcinoma metastatic to multiple sites (HCC)     Pain from bone metastases (HCC)     Calculus of gallbladder with acute on chronic cholecystitis without obstruction     Carcinoma of breast metastatic to bone (HCC)          Past Surgical History:   Procedure Laterality Date    Colonoscopy  2016    Lumpectomy left      Sent lymph node biopsy       Social History     Socioeconomic History    Marital status:    Tobacco Use    Smoking status: Never    Smokeless tobacco: Never   Vaping Use    Vaping status: Never Used   Substance and Sexual Activity    Alcohol use: Yes     Alcohol/week: 14.0 standard drinks of alcohol     Types: 14 Glasses of wine per week     Comment: DAILY    Drug use: Never     History   Drug Use Unknown     Available pre-op labs reviewed.  Lab Results   Component Value Date    WBC 3.4 (L) 11/08/2024    RBC 3.78 (L) 11/08/2024    HGB 13.6 11/08/2024    HCT 39.1 11/08/2024    .4 (H) 11/08/2024    MCH 36.0 (H) 11/08/2024    MCHC 34.8 11/08/2024    RDW 12.5 11/08/2024    .0 11/08/2024     Lab Results   Component Value Date     11/08/2024    K 3.9 11/08/2024     11/08/2024    CO2 31.0 11/08/2024    BUN 14 11/08/2024    CREATSERUM 0.93 11/08/2024    GLU 77 11/08/2024    CA 9.7 11/08/2024            Airway      Mallampati: II  Mouth opening: >3 FB  TM distance: > 6 cm  Neck ROM: full Cardiovascular      Rhythm: regular  Rate: normal  (-) murmur   Dental  Comment: Dentition is grossly intact;  Patient does not demonstrate loose teeth to inspection.           Pulmonary  Comment: Unlabored ventilatory effort, no retractions.  Pulmonary exam normal.  Breath sounds clear to auscultation bilaterally.               Other findings              ASA: 3   Plan: general  NPO status verified and patient meets guidelines.        Comment: I explained intrinsic risks of general anesthesia, including  nausea, dental damage, sore throat, mouth injury,and hoarseness from airway management.  All questions were answered and understanding was demonstrated of risks.  Informed permission was obtained to proceed as documented in the signed consent form.     Plan/risks discussed with: patient and spouse                Present on Admission:  **None**             [1]   Medications Prior to Admission   Medication Sig Dispense Refill Last Dose/Taking    letrozole 2.5 MG Oral Tab Take 1 tablet (2.5 mg total) by mouth daily. 30 tablet 3 11/15/2024 Morning    Abemaciclib 150 MG Oral Tab Take 1 tablet (150 mg total) by mouth 2 (two) times daily. may take with or without food 60 tablet 6 Past Week    metoprolol succinate ER 25 MG Oral Tablet 24 Hr Take 1 tablet (25 mg total) by mouth daily.   11/14/2024 Morning    ALPRAZolam 0.25 MG Oral Tab Take 1 tablet (0.25 mg total) by mouth As Directed.   Past Month    valsartan 160 MG Oral Tab Take 1 tablet (160 mg total) by mouth daily.   11/14/2024 Morning    HYDROcodone-acetaminophen  MG Oral Tab  (Patient not taking: Reported on 11/8/2024)

## 2024-12-02 ENCOUNTER — OFFICE VISIT (OUTPATIENT)
Facility: LOCATION | Age: 69
End: 2024-12-02
Payer: MEDICARE

## 2024-12-02 VITALS
SYSTOLIC BLOOD PRESSURE: 132 MMHG | DIASTOLIC BLOOD PRESSURE: 62 MMHG | OXYGEN SATURATION: 97 % | HEIGHT: 62 IN | TEMPERATURE: 99 F | WEIGHT: 120 LBS | HEART RATE: 67 BPM | BODY MASS INDEX: 22.08 KG/M2

## 2024-12-02 DIAGNOSIS — Z98.890 POSTOPERATIVE STATE: Primary | ICD-10-CM

## 2024-12-02 DIAGNOSIS — Z90.49 STATUS POST LAPAROSCOPIC CHOLECYSTECTOMY: ICD-10-CM

## 2024-12-02 RX ORDER — IBUPROFEN 200 MG
200 TABLET ORAL EVERY 6 HOURS PRN
COMMUNITY

## 2024-12-02 NOTE — PROGRESS NOTES
Post Operative Visit Note       Active Problems  1. Postoperative state    2. Status post laparoscopic cholecystectomy         Chief Complaint   Chief Complaint   Patient presents with    Post-Op     PO - 11/15/24 PBP, LAPAROSCOPIC CHOLECYSTECTOMY          History of Present Illness   The patient presents for continued care and evaluation following a laparoscopic cholecystectomy with ICG cholangiogram with Dr. Bocanegra on 11/15/2024.     Overall, the patient is doing okay postoperatively. The patient states she     She states prior to her operation, she would have breakfast and dinner. She states if she does this now, she has more of an upset stomach and abdominal distention, and thinks she will do better with more frequent small meals.  She denies nausea or vomiting.     She has a known cyst on her right ovary, and states initially following her operation, she had some right lower quadrant abdominal pain that she attributes to this.    She states she has restarted her verzenio.  She states when she discontinues it and restarts it, this can cause her to have abdominal discomfort.  She is currently having bowel movements every 2-3 days. She reports a few episodes of diarrhea, but this has sice resolved. She attributes this to restarting the verzenio.     The patient does not require a return to work note.     Allergies  Marija is allergic to levofloxacin.    Past Medical / Surgical / Social / Family History    The past medical and past surgical history have been reviewed by me today.     Past Medical History:    Anxiety state    Breast cancer (HCC)    BREAST CANCER WITH METS, CURRENTLY ON TARGETED THERAPY-ORAL ABEMACICLIB    Essential hypertension    Exposure to medical diagnostic radiation    LAST TREATMENT NOVEMBER 2023    Gall stones    monitored by PCP    Hepatitis    High blood pressure    Neuropathy    LEFT FOOT    Visual impairment    READING GLASSES     Past Surgical History:   Procedure Laterality Date     Colonoscopy  2016    Lumpectomy left      Sent lymph node biopsy         The family history and social history have been reviewed by me today.    History reviewed. No pertinent family history.  Social History     Socioeconomic History    Marital status:    Tobacco Use    Smoking status: Never    Smokeless tobacco: Never   Vaping Use    Vaping status: Never Used   Substance and Sexual Activity    Alcohol use: Yes     Alcohol/week: 14.0 standard drinks of alcohol     Types: 14 Glasses of wine per week     Comment: DAILY    Drug use: Never        Current Outpatient Medications:     oxyCODONE 5 MG Oral Tab, Take 1 tablet (5 mg total) by mouth every 6 (six) hours as needed for Pain., Disp: 15 tablet, Rfl: 0    senna-docusate 8.6-50 MG Oral Tab, Take 1 tablet by mouth daily., Disp: 30 tablet, Rfl: 0    letrozole 2.5 MG Oral Tab, Take 1 tablet (2.5 mg total) by mouth daily., Disp: 30 tablet, Rfl: 3    Abemaciclib 150 MG Oral Tab, Take 1 tablet (150 mg total) by mouth 2 (two) times daily. may take with or without food, Disp: 60 tablet, Rfl: 6    metoprolol succinate ER 25 MG Oral Tablet 24 Hr, Take 1 tablet (25 mg total) by mouth daily., Disp: , Rfl:     ALPRAZolam 0.25 MG Oral Tab, Take 1 tablet (0.25 mg total) by mouth As Directed., Disp: , Rfl:     valsartan 160 MG Oral Tab, Take 1 tablet (160 mg total) by mouth daily., Disp: , Rfl:       Review of Systems  The Review of Systems has been reviewed by me during today.  Review of Systems    Physical Findings   There were no vitals taken for this visit.  Physical Exam  Vitals and nursing note reviewed.   Constitutional:       General: She is not in acute distress.     Appearance: Normal appearance. She is normal weight.   HENT:      Head: Normocephalic and atraumatic.      Right Ear: External ear normal.      Left Ear: External ear normal.      Nose: Nose normal.   Eyes:      General: No scleral icterus.     Conjunctiva/sclera: Conjunctivae normal.   Abdominal:       General: Abdomen is flat. There is no distension.      Palpations: Abdomen is soft. There is no mass.      Tenderness: There is no abdominal tenderness.      Hernia: No hernia is present.      Comments: Clinical exam of the abdomen reveals it to be soft, nondistended, nontender to palpation.  Laparoscopic incision sites are clean, dry, intact without surrounding erythema or cellulitis.  Skin glue remains in place over some of her incision sites.   Musculoskeletal:      Cervical back: Normal range of motion and neck supple.   Neurological:      Mental Status: She is alert.   Psychiatric:         Mood and Affect: Mood normal.         Behavior: Behavior normal.         Thought Content: Thought content normal.             Assessment   1. Postoperative state    2. Status post laparoscopic cholecystectomy          Plan   The patient is doing well status post laparoscopic cholecystectomy.    I took the opportunity at this appointment to discuss the pathology with the patient which revealed chronic cholecystitis and cholelithiasis.  Negative for malignancy.      I discussed with the patient that they should refrain from any bending, pushing, pulling, twisting, or lifting of a force greater than 15-20 pounds for 4 weeks post-op. Activity restrictions were reviewed. Driving restrictions were reviewed.     The patient may shower. They should avoid scrubbing their incisions, but may allow soap and water to wash over the incisions. The patient should avoid submerging their incisions in a bath, hot tub, pool for a total of 2 weeks postoperatively.    The patient should continue a general diet.  I explained to the patient that it is common to have a decreased appetite in the initial postoperative.  Her appetite should return to baseline with time.  I recommend she continue with smaller meals during the day to ensure she is able to tolerate adequate p.o. intake.    I recommend she take stool softeners as needed to regulate bowel  movements.    The patient may take ibuprofen and Tylenol as needed for pain management.  They may also utilize heating pads or ice packs for comfort measures.    All of the patient's questions were answered.  The patient verbalized understanding and agreement with the plan of care.    I have no further follow-up scheduled with this patient at this time.  This patient can see a Physician Assistant or Dr. Bocanegra on an as-needed basis.  This patient should return urgently for any problems or complications related to the surgical intervention.         No orders of the defined types were placed in this encounter.      Imaging & Referrals   None    Follow Up  No follow-ups on file.    Baylee Correa PA-C

## 2025-01-03 ENCOUNTER — OFFICE VISIT (OUTPATIENT)
Age: 70
End: 2025-01-03
Attending: INTERNAL MEDICINE
Payer: MEDICARE

## 2025-01-03 DIAGNOSIS — G89.3 PAIN FROM BONE METASTASES (HCC): ICD-10-CM

## 2025-01-03 DIAGNOSIS — C79.51 PAIN FROM BONE METASTASES (HCC): ICD-10-CM

## 2025-01-03 DIAGNOSIS — C50.919 CARCINOMA OF BREAST METASTATIC TO MULTIPLE SITES, UNSPECIFIED LATERALITY (HCC): Primary | ICD-10-CM

## 2025-01-03 NOTE — PROGRESS NOTES
Education Record    Learner:  Patient    Disease / Diagnosis: breast cancer    Barriers / Limitations:  None   Comments:    Method:  Brief focused   Comments:    General Topics:  Plan of care reviewed   Comments:    Outcome:  Shows understanding   Comments:    Zometa infused without complication.  Pt discharged in stable condition. Pt to rtc 3/7 @ 10:45 for pl, md, zometa.

## 2025-01-16 DIAGNOSIS — C50.912 CARCINOMA OF LEFT BREAST METASTATIC TO MULTIPLE SITES (HCC): ICD-10-CM

## 2025-01-16 DIAGNOSIS — C50.919 CARCINOMA OF BREAST METASTATIC TO MULTIPLE SITES, UNSPECIFIED LATERALITY (HCC): ICD-10-CM

## 2025-01-17 RX ORDER — LETROZOLE 2.5 MG/1
2.5 TABLET, FILM COATED ORAL DAILY
Qty: 30 TABLET | Refills: 3 | Status: SHIPPED | OUTPATIENT
Start: 2025-01-17

## 2025-02-18 NOTE — DISCHARGE INSTRUCTIONS
[facility] Department of Radiology    Biopsy of any type    Have someone drive you home after your procedure. You may not drive yourself home    [address]    Take things easy for the rest of the day after your biopsy.   You may be sore in the biopsy area for one to five days  DO drink plenty of fluids  DO resume your regular diet  DO keep a bandage on the biopsy site for at least 24 hours  DO NOT take a hot bath or shower for at least 12 hours  DO NOT drive or operative machinery for at least 24 hours  DO NOT smoke for at least 24 hours  DO NOT do any strenuous exercise or lifting for at least 48 hours  DO call your doctor immediately if  You start bleeding  There is any change in color or temperature of the area where the biopsy was performed  You develop increasing pain in shortness of breath Yes

## 2025-03-05 NOTE — PROGRESS NOTES
Cancer Center Progress Note      Patient Name:  Marija Elaine  YOB: 1955  Medical Record Number:  [id number]    Date of visit:  3/7/2025    CHIEF COMPLAINT: Metastatic breast carcinoma.    ONCOLOGY HISTORY:    Bone metastasis .  Biopsy T-spine -metastatic breast carcinoma.  Letrozole -  Abemaciclib 10/23-  RT C6 10/23-    HPI:     69 year old female that I have followed since  after w/u of back pain and bone scan -multiple skeletal metastasis.  MRI C-spine -metastatic disease at C6 with epidural tumor.  H/o L breast ca  s/p L lumpectomy, RT, tamoxifen x 5 years.    Letrozole started .  MRI T-spine -diffuse osseous metastatic disease, most pronounced T2-T5.   Mild epidural extension of tumor along T4, T5, T8.  PET scan -multiple areas of osseous metastatic disease.    CT biopsy T-spine -metastatic breast carcinoma, 50% ER, 25% CT, HER2 2+ by IHC, FISH pending.  Ki-67 10%.  Abemaciclib started 10/23.      US abdomen  for elevated LFTs showed cholelithiasis.  Distended gallbladder.  HIDA scan did not show any acute cholecystitis.  S/p lap cholecystectomy .  Abemaciclib dose reduced to 100 mg twice daily.  No more constipation.  Chronic and stable back pain.    SOCIAL HISTORY:    , lives in [place], .  2 sons in [place].  Older is  for spinal cord stimulator implants.  He has a 5-year-old child.  Younger son has a 2-year-old child and .    ROS:     Constitutional: no fever, chills fatigue,night sweats or weight loss  Neurologic: no headache, seizures, diplopia or weakness  Respiratory: no cough, SOB or hemoptysis  Cardiovascular: no chest pain, ankle swelling, RIOS  GI: no nausea, vomiting, diarrhea or BRBPR  Musculoskeletal: T-spine pain  Dermatologic: no alopecia, rash, pruritis  : no hematuria, dysuria or frequency  Psych: no confusion or depression   Heme: no easy bruising or bleeding      ALLERGIES:    Allergies   Allergen Reactions    Levofloxacin HIVES     Levaquin         MEDICATIONS:    Current Outpatient Medications   Medication Sig Dispense Refill    LETROZOLE 2.5 MG Oral Tab TAKE 1 TABLET(2.5 MG) BY MOUTH DAILY 30 tablet 3    ALPRAZolam 0.25 MG Oral Tab Take 1 tablet (0.25 mg total) by mouth 2 (two) times daily as needed. 30 tablet 3    Abemaciclib 100 MG Oral Tab Take 1 tablet (100 mg total) by mouth 2 (two) times daily. may take with or without food 60 tablet 11    ibuprofen (ADVIL) 200 MG Oral Tab Take 1 tablet (200 mg total) by mouth every 6 (six) hours as needed for Pain.      metoprolol succinate ER 25 MG Oral Tablet 24 Hr Take 1 tablet (25 mg total) by mouth daily.      valsartan 160 MG Oral Tab Take 1 tablet (160 mg total) by mouth daily.         VITALS:  Height: 157.5 cm (5' 2.01\") (1041)  Weight: 55.3 kg (122 lb) (1041)  BSA (Calculated - sq m): 1.55 sq meters (1041)  Pulse: 80 (1041)  BP: 153/98 (1041)  Temp: 97.7 °F (36.5 °C) (1041)  Do Not Use - Resp Rate: --  SpO2: 99 % (1041)    PS:  ECO    PHYSICAL EXAM:    General: alert and oriented x 3, not in acute distress.  Accompanied by .  HEENT: CAROL, oropharynx  clear.  Neck: supple.  No JVD /adenopathy.  CVS: S1S2, regular  Rhythm, no murmurs.   Lungs: Clear to auscultation and percussion.  Abdomen: soft, no masses  Extremities:  No edema.  CNS: no focal deficit    Emotional well being: Patient's emotional well being was assessed.  No issues requiring acute psychosocial intervention were identified.     IMAGING:    LABS:     Recent Results (from the past 24 hours)   CANCER ANTIGEN (CA) 15-3 [E]    Collection Time: 25 10:33 AM   Result Value Ref Range    Cancer Ag 15-3 38.0 (H) <=32.4 U/mL   CA 27.29 [E]    Collection Time: 25 10:33 AM   Result Value Ref Range    Breast Carcinoma Ag Tr5126 73.8 (H) <38.0 u/mL   Comp Metabolic Panel (14)    Collection Time: 25  10:33 AM   Result Value Ref Range    Glucose 109 (H) 70 - 99 mg/dL    Sodium 141 136 - 145 mmol/L    Potassium 4.2 3.5 - 5.1 mmol/L    Chloride 103 98 - 112 mmol/L    CO2 30.0 21.0 - 32.0 mmol/L    Anion Gap 8 0 - 18 mmol/L    BUN 14 9 - 23 mg/dL    Creatinine 0.94 0.55 - 1.02 mg/dL    Calcium, Total 9.8 8.7 - 10.6 mg/dL    Calculated Osmolality 293 275 - 295 mOsm/kg    eGFR-Cr 65 >=60 mL/min/1.73m2    AST 99 (H) <34 U/L     (H) 10 - 49 U/L    Alkaline Phosphatase 258 (H) 55 - 142 U/L    Bilirubin, Total 0.6 0.2 - 1.1 mg/dL    Total Protein 7.3 5.7 - 8.2 g/dL    Albumin 4.7 3.2 - 4.8 g/dL    Globulin  2.6 2.0 - 3.5 g/dL    A/G Ratio 1.8 1.0 - 2.0    Patient Fasting for CMP? Patient not present    CBC With Differential With Platelet    Collection Time: 03/07/25 10:33 AM   Result Value Ref Range    WBC 3.9 (L) 4.0 - 11.0 x10(3) uL    RBC 3.75 (L) 3.80 - 5.30 x10(6)uL    HGB 12.8 12.0 - 16.0 g/dL    HCT 36.9 35.0 - 48.0 %    .0 150.0 - 450.0 10(3)uL    MCV 98.4 80.0 - 100.0 fL    MCH 34.1 (H) 26.0 - 34.0 pg    MCHC 34.7 31.0 - 37.0 g/dL    RDW 14.0 %    Neutrophil Absolute Prelim 2.60 1.50 - 7.70 x10 (3) uL    Neutrophil Absolute 2.60 1.50 - 7.70 x10(3) uL    Lymphocyte Absolute 0.88 (L) 1.00 - 4.00 x10(3) uL    Monocyte Absolute 0.28 0.10 - 1.00 x10(3) uL    Eosinophil Absolute 0.08 0.00 - 0.70 x10(3) uL    Basophil Absolute 0.07 0.00 - 0.20 x10(3) uL    Immature Granulocyte Absolute 0.01 0.00 - 1.00 x10(3) uL    Neutrophil % 66.4 %    Lymphocyte % 22.4 %    Monocyte % 7.1 %    Eosinophil % 2.0 %    Basophil % 1.8 %    Immature Granulocyte % 0.3 %           Lab Results   Component Value Date     38.0 (H) 03/07/2025     38.7 (H) 01/03/2025     46.9 (H) 11/08/2024     47.1 (H) 09/13/2024     42.9 (H) 08/16/2024       ASSESSMENT AND PLAN:     # Metastatic breast carcinoma: Diag 9/23, bone only disease.  Bx proven involvement T spine.  ER/ND +, Her 2 neg.     Letrozole started 9/23.   Abemaciclib started 10/23.  Tumor markers improving.  PET scan 10/24 showed increase in hypermetabolic some T3/T4 but other areas looked better.  Abemaciclib dose reduced to 100 mg twice daily 1/25 due to diarrhea.   Due for restaging PET scan.    # Elevated liver enzymes: Persistent.  Possibly due to abemaciclib?. Should drug and repeat labs in a week.      # Bone metastasis: Started zoledronic acid 10/23, denosumab not covered.  MRI spine 7/24 showed evidence of similar metastatic disease in C-spine, there was possible trace epidural tumor at C6-C7, has received RT to C6.  T-spine showed favorable response with improvement, L-spine also showed improvement though persistent tumor was noted.  Continue present management.  On monthly zoledronic acid, changed to o q 2 months 9/24. Uptodate on dental exams.    # Diarrhea: Resolved.    # Severe back pain: Received RT to C-spine.  MRI of entire spine 7/24 does not show any new areas of disease.  Pain much better.      ORDERS PLACED:    Return in about 2 weeks (around 3/21/2025) for Labs, MD visit, zometa Infusion.    Michelle Reinoso M.D.    [facility]    [facility]  [address] [place], [address]    3/7/2025

## 2025-03-07 ENCOUNTER — OFFICE VISIT (OUTPATIENT)
Age: 70
End: 2025-03-07
Attending: INTERNAL MEDICINE
Payer: MEDICARE

## 2025-03-07 VITALS
SYSTOLIC BLOOD PRESSURE: 153 MMHG | TEMPERATURE: 98 F | WEIGHT: 122 LBS | HEIGHT: 62.01 IN | HEART RATE: 80 BPM | RESPIRATION RATE: 16 BRPM | DIASTOLIC BLOOD PRESSURE: 98 MMHG | BODY MASS INDEX: 22.17 KG/M2 | OXYGEN SATURATION: 99 %

## 2025-03-07 DIAGNOSIS — G89.3 PAIN FROM BONE METASTASES (HCC): ICD-10-CM

## 2025-03-07 DIAGNOSIS — R79.89 ABNORMAL LFTS: ICD-10-CM

## 2025-03-07 DIAGNOSIS — C79.51 PAIN FROM BONE METASTASES (HCC): ICD-10-CM

## 2025-03-07 DIAGNOSIS — C79.51 CANCER, METASTATIC TO BONE (HCC): ICD-10-CM

## 2025-03-07 DIAGNOSIS — C50.919 CARCINOMA OF BREAST METASTATIC TO MULTIPLE SITES, UNSPECIFIED LATERALITY (HCC): Primary | ICD-10-CM

## 2025-03-07 DIAGNOSIS — C50.919 CARCINOMA OF BREAST METASTATIC TO MULTIPLE SITES, UNSPECIFIED LATERALITY (HCC): ICD-10-CM

## 2025-03-07 DIAGNOSIS — K52.1 DIARRHEA DUE TO DRUG: ICD-10-CM

## 2025-03-07 DIAGNOSIS — R79.89 ELEVATED LFTS: ICD-10-CM

## 2025-03-07 LAB
ALBUMIN SERPL-MCNC: 4.7 G/DL (ref 3.2–4.8)
ALBUMIN/GLOB SERPL: 1.8 {RATIO} (ref 1–2)
ALP LIVER SERPL-CCNC: 258 U/L
ALT SERPL-CCNC: 104 U/L
ANION GAP SERPL CALC-SCNC: 8 MMOL/L (ref 0–18)
AST SERPL-CCNC: 99 U/L (ref ?–34)
BASOPHILS # BLD AUTO: 0.07 X10(3) UL (ref 0–0.2)
BASOPHILS NFR BLD AUTO: 1.8 %
BILIRUB SERPL-MCNC: 0.6 MG/DL (ref 0.2–1.1)
BRCA1 C.185DELAG BLD/T QL: 73.8 U/ML (ref ?–38)
BUN BLD-MCNC: 14 MG/DL (ref 9–23)
CALCIUM BLD-MCNC: 9.8 MG/DL (ref 8.7–10.6)
CANCER AG15-3 SERPL-ACNC: 38 U/ML (ref ?–32.4)
CHLORIDE SERPL-SCNC: 103 MMOL/L (ref 98–112)
CO2 SERPL-SCNC: 30 MMOL/L (ref 21–32)
CREAT BLD-MCNC: 0.94 MG/DL
EGFRCR SERPLBLD CKD-EPI 2021: 65 ML/MIN/1.73M2 (ref 60–?)
EOSINOPHIL # BLD AUTO: 0.08 X10(3) UL (ref 0–0.7)
EOSINOPHIL NFR BLD AUTO: 2 %
ERYTHROCYTE [DISTWIDTH] IN BLOOD BY AUTOMATED COUNT: 14 %
GLOBULIN PLAS-MCNC: 2.6 G/DL (ref 2–3.5)
GLUCOSE BLD-MCNC: 109 MG/DL (ref 70–99)
HCT VFR BLD AUTO: 36.9 %
HGB BLD-MCNC: 12.8 G/DL
IMM GRANULOCYTES # BLD AUTO: 0.01 X10(3) UL (ref 0–1)
IMM GRANULOCYTES NFR BLD: 0.3 %
LYMPHOCYTES # BLD AUTO: 0.88 X10(3) UL (ref 1–4)
LYMPHOCYTES NFR BLD AUTO: 22.4 %
MCH RBC QN AUTO: 34.1 PG (ref 26–34)
MCHC RBC AUTO-ENTMCNC: 34.7 G/DL (ref 31–37)
MCV RBC AUTO: 98.4 FL
MONOCYTES # BLD AUTO: 0.28 X10(3) UL (ref 0.1–1)
MONOCYTES NFR BLD AUTO: 7.1 %
NEUTROPHILS # BLD AUTO: 2.6 X10 (3) UL (ref 1.5–7.7)
NEUTROPHILS # BLD AUTO: 2.6 X10(3) UL (ref 1.5–7.7)
NEUTROPHILS NFR BLD AUTO: 66.4 %
OSMOLALITY SERPL CALC.SUM OF ELEC: 293 MOSM/KG (ref 275–295)
PLATELET # BLD AUTO: 162 10(3)UL (ref 150–450)
POTASSIUM SERPL-SCNC: 4.2 MMOL/L (ref 3.5–5.1)
PROT SERPL-MCNC: 7.3 G/DL (ref 5.7–8.2)
RBC # BLD AUTO: 3.75 X10(6)UL
SODIUM SERPL-SCNC: 141 MMOL/L (ref 136–145)
WBC # BLD AUTO: 3.9 X10(3) UL (ref 4–11)

## 2025-03-07 NOTE — PROGRESS NOTES
[facility] Pharmacy Note:  Renal Dose Adjustment for Zometa (Zoledronic acid)    Marija Elaine is a 69 year old female has been prescribed Zometa (Zoledronic acid)  4 mg IVPB once.    LABS:   Lab Results   Component Value Date/Time    CREATSERUM 0.94 03/07/2025 10:33 AM    BUN 14 03/07/2025 10:33 AM         CrCl:   Estimated CrCL ~ 49 ml/min based on SCr 0.94 and using ABW.    Calculated creatinine clearance is 40 to 49 mL/minute therefore, the dose of Zometa (Zoledronic acid) has been changed to 3.3 mg IVPB once per P&T approved protocol.     Thank you,  Rhianna Foster [facility]  3/7/2025 11:23 AM

## 2025-03-07 NOTE — PROGRESS NOTES
Pt here for Zometa . Pt denies any issues or concerns.      Ordering Provider: dr webb  Order Exp: ongoing     Pt tolerated infusion without difficulty or complaint. Reviewed next apt date/time: labs next week, zometa 2 months      Education Record  Learner:  Patient  Disease / Diagnosis: breast cancer  Barriers / Limitations:  None  Method:  Brief focused  General Topics:  Plan of care reviewed  Outcome:  Shows understanding     Per dr webb, patient to hold abemaciclib for one week due to elevated liver enzymes and patient to repeat labs in one week.  Patient made aware and states understanding.  Lab appt made for 3-13-25 as going out of town 3-14-25

## 2025-03-07 NOTE — PROGRESS NOTES
Education Record    Learner:  Patient/ spouse    Disease / Diagnosis:met breast cancer     Verzenio/ letrozole.   Zometa     Barriers / Limitations:  None   Comments:    Method:  Discussion   Comments:    General Topics:  Plan of care reviewed   Comments:    Outcome:  Shows understanding   Comments:   Recent dental cleaning, no issues.   Less diarrhea with 100mg dose, some constipation even. But had gall bladder out last year too.   Energy is ok.   Dryness, to skin and vaginal dryness. Had been using coconut oil, bough replens to try now.   Does have back pain in area of radiation after working/ standing all day.   Planning 6 week long term sub in April.

## 2025-03-13 ENCOUNTER — NURSE ONLY (OUTPATIENT)
Age: 70
End: 2025-03-13
Attending: INTERNAL MEDICINE
Payer: MEDICARE

## 2025-03-13 DIAGNOSIS — R79.89 ELEVATED LFTS: ICD-10-CM

## 2025-03-13 DIAGNOSIS — C50.919 CARCINOMA OF BREAST METASTATIC TO MULTIPLE SITES, UNSPECIFIED LATERALITY (HCC): ICD-10-CM

## 2025-03-13 LAB
ALBUMIN SERPL-MCNC: 4.7 G/DL (ref 3.2–4.8)
ALBUMIN/GLOB SERPL: 1.7 {RATIO} (ref 1–2)
ALP LIVER SERPL-CCNC: 253 U/L
ALT SERPL-CCNC: 59 U/L
ANION GAP SERPL CALC-SCNC: 8 MMOL/L (ref 0–18)
AST SERPL-CCNC: 46 U/L (ref ?–34)
BASOPHILS # BLD AUTO: 0.06 X10(3) UL (ref 0–0.2)
BASOPHILS NFR BLD AUTO: 1.9 %
BILIRUB DIRECT SERPL-MCNC: 0.2 MG/DL (ref ?–0.3)
BILIRUB SERPL-MCNC: 0.6 MG/DL (ref 0.2–1.1)
BRCA1 C.185DELAG BLD/T QL: 70.9 U/ML (ref ?–38)
BUN BLD-MCNC: 13 MG/DL (ref 9–23)
CALCIUM BLD-MCNC: 10 MG/DL (ref 8.7–10.6)
CANCER AG15-3 SERPL-ACNC: 34.6 U/ML (ref ?–32.4)
CHLORIDE SERPL-SCNC: 104 MMOL/L (ref 98–112)
CO2 SERPL-SCNC: 31 MMOL/L (ref 21–32)
CREAT BLD-MCNC: 1.02 MG/DL
EGFRCR SERPLBLD CKD-EPI 2021: 59 ML/MIN/1.73M2 (ref 60–?)
EOSINOPHIL # BLD AUTO: 0.09 X10(3) UL (ref 0–0.7)
EOSINOPHIL NFR BLD AUTO: 2.9 %
ERYTHROCYTE [DISTWIDTH] IN BLOOD BY AUTOMATED COUNT: 13.6 %
GLOBULIN PLAS-MCNC: 2.7 G/DL (ref 2–3.5)
GLUCOSE BLD-MCNC: 123 MG/DL (ref 70–99)
HCT VFR BLD AUTO: 36.3 %
HGB BLD-MCNC: 12.7 G/DL
IMM GRANULOCYTES # BLD AUTO: 0 X10(3) UL (ref 0–1)
IMM GRANULOCYTES NFR BLD: 0 %
LYMPHOCYTES # BLD AUTO: 0.82 X10(3) UL (ref 1–4)
LYMPHOCYTES NFR BLD AUTO: 26 %
MCH RBC QN AUTO: 34.7 PG (ref 26–34)
MCHC RBC AUTO-ENTMCNC: 35 G/DL (ref 31–37)
MCV RBC AUTO: 99.2 FL
MONOCYTES # BLD AUTO: 0.42 X10(3) UL (ref 0.1–1)
MONOCYTES NFR BLD AUTO: 13.3 %
NEUTROPHILS # BLD AUTO: 1.76 X10 (3) UL (ref 1.5–7.7)
NEUTROPHILS # BLD AUTO: 1.76 X10(3) UL (ref 1.5–7.7)
NEUTROPHILS NFR BLD AUTO: 55.9 %
OSMOLALITY SERPL CALC.SUM OF ELEC: 297 MOSM/KG (ref 275–295)
PLATELET # BLD AUTO: 162 10(3)UL (ref 150–450)
POTASSIUM SERPL-SCNC: 4.2 MMOL/L (ref 3.5–5.1)
PROT SERPL-MCNC: 7.4 G/DL (ref 5.7–8.2)
RBC # BLD AUTO: 3.66 X10(6)UL
SODIUM SERPL-SCNC: 143 MMOL/L (ref 136–145)
WBC # BLD AUTO: 3.2 X10(3) UL (ref 4–11)

## 2025-03-28 ENCOUNTER — NURSE ONLY (OUTPATIENT)
Age: 70
End: 2025-03-28
Attending: INTERNAL MEDICINE
Payer: MEDICARE

## 2025-03-28 DIAGNOSIS — C50.919 CARCINOMA OF BREAST METASTATIC TO MULTIPLE SITES, UNSPECIFIED LATERALITY (HCC): ICD-10-CM

## 2025-03-28 LAB
ALBUMIN SERPL-MCNC: 4.7 G/DL (ref 3.2–4.8)
ALBUMIN/GLOB SERPL: 1.7 {RATIO} (ref 1–2)
ALP LIVER SERPL-CCNC: 224 U/L
ALT SERPL-CCNC: 48 U/L
ANION GAP SERPL CALC-SCNC: 9 MMOL/L (ref 0–18)
AST SERPL-CCNC: 49 U/L (ref ?–34)
BASOPHILS # BLD AUTO: 0.08 X10(3) UL (ref 0–0.2)
BASOPHILS NFR BLD AUTO: 1.8 %
BILIRUB SERPL-MCNC: 0.7 MG/DL (ref 0.2–1.1)
BRCA1 C.185DELAG BLD/T QL: 80.8 U/ML (ref ?–38)
BUN BLD-MCNC: 13 MG/DL (ref 9–23)
CALCIUM BLD-MCNC: 10.1 MG/DL (ref 8.7–10.6)
CANCER AG15-3 SERPL-ACNC: 36.4 U/ML (ref ?–32.4)
CHLORIDE SERPL-SCNC: 105 MMOL/L (ref 98–112)
CO2 SERPL-SCNC: 30 MMOL/L (ref 21–32)
CREAT BLD-MCNC: 0.98 MG/DL
EGFRCR SERPLBLD CKD-EPI 2021: 62 ML/MIN/1.73M2 (ref 60–?)
EOSINOPHIL # BLD AUTO: 0.22 X10(3) UL (ref 0–0.7)
EOSINOPHIL NFR BLD AUTO: 4.9 %
ERYTHROCYTE [DISTWIDTH] IN BLOOD BY AUTOMATED COUNT: 13.1 %
GLOBULIN PLAS-MCNC: 2.7 G/DL (ref 2–3.5)
GLUCOSE BLD-MCNC: 88 MG/DL (ref 70–99)
HCT VFR BLD AUTO: 39.8 %
HGB BLD-MCNC: 13.6 G/DL
IMM GRANULOCYTES # BLD AUTO: 0.01 X10(3) UL (ref 0–1)
IMM GRANULOCYTES NFR BLD: 0.2 %
LYMPHOCYTES # BLD AUTO: 0.92 X10(3) UL (ref 1–4)
LYMPHOCYTES NFR BLD AUTO: 20.6 %
MCH RBC QN AUTO: 33.7 PG (ref 26–34)
MCHC RBC AUTO-ENTMCNC: 34.2 G/DL (ref 31–37)
MCV RBC AUTO: 98.8 FL
MONOCYTES # BLD AUTO: 0.55 X10(3) UL (ref 0.1–1)
MONOCYTES NFR BLD AUTO: 12.3 %
NEUTROPHILS # BLD AUTO: 2.68 X10 (3) UL (ref 1.5–7.7)
NEUTROPHILS # BLD AUTO: 2.68 X10(3) UL (ref 1.5–7.7)
NEUTROPHILS NFR BLD AUTO: 60.2 %
OSMOLALITY SERPL CALC.SUM OF ELEC: 298 MOSM/KG (ref 275–295)
PLATELET # BLD AUTO: 217 10(3)UL (ref 150–450)
POTASSIUM SERPL-SCNC: 4 MMOL/L (ref 3.5–5.1)
PROT SERPL-MCNC: 7.4 G/DL (ref 5.7–8.2)
RBC # BLD AUTO: 4.03 X10(6)UL
SODIUM SERPL-SCNC: 144 MMOL/L (ref 136–145)
WBC # BLD AUTO: 4.5 X10(3) UL (ref 4–11)

## 2025-04-04 ENCOUNTER — HOSPITAL ENCOUNTER (OUTPATIENT)
Dept: NUCLEAR MEDICINE | Facility: HOSPITAL | Age: 70
Discharge: HOME | End: 2025-04-04
Attending: INTERNAL MEDICINE
Payer: MEDICARE

## 2025-04-04 DIAGNOSIS — R79.89 ABNORMAL LFTS: ICD-10-CM

## 2025-04-04 DIAGNOSIS — C79.51 PAIN FROM BONE METASTASES (HCC): ICD-10-CM

## 2025-04-04 DIAGNOSIS — G89.3 PAIN FROM BONE METASTASES (HCC): ICD-10-CM

## 2025-04-04 DIAGNOSIS — C50.919 CARCINOMA OF BREAST METASTATIC TO MULTIPLE SITES, UNSPECIFIED LATERALITY (HCC): ICD-10-CM

## 2025-04-04 DIAGNOSIS — C79.51 CANCER, METASTATIC TO BONE (HCC): ICD-10-CM

## 2025-04-04 DIAGNOSIS — R79.89 ELEVATED LFTS: ICD-10-CM

## 2025-04-04 DIAGNOSIS — K52.1 DIARRHEA DUE TO DRUG: ICD-10-CM

## 2025-04-04 LAB — GLUCOSE BLD-MCNC: 75 MG/DL (ref 70–99)

## 2025-04-04 PROCEDURE — 82962 GLUCOSE BLOOD TEST: CPT

## 2025-04-04 PROCEDURE — 78815 PET IMAGE W/CT SKULL-THIGH: CPT | Performed by: INTERNAL MEDICINE

## 2025-04-07 DIAGNOSIS — C79.51 CANCER, METASTATIC TO BONE (HCC): ICD-10-CM

## 2025-04-07 DIAGNOSIS — C50.919 CARCINOMA OF BREAST METASTATIC TO MULTIPLE SITES, UNSPECIFIED LATERALITY (HCC): ICD-10-CM

## 2025-04-29 NOTE — PROGRESS NOTES
Cancer Center Progress Note      Patient Name:  Marija Elaine  YOB: 1955  Medical Record Number:  [id number]    Date of visit: 2025    CHIEF COMPLAINT: Metastatic breast carcinoma.    ONCOLOGY HISTORY:    Bone metastasis .  Biopsy T-spine -metastatic breast carcinoma.  Letrozole -  Abemaciclib 10/23-3/25  RT C 6 10/23-  Palbo -    HPI:     70 year old female that I have followed since  after w/u of back pain and bone scan -multiple skeletal metastasis.  MRI C-spine -metastatic disease at C6 with epidural tumor.  H/o L breast ca  s/p L lumpectomy, RT, tamoxifen x 5 years.    Letrozole started .  MRI T-spine -diffuse osseous metastatic disease, most pronounced T2-T5.   Mild epidural extension of tumor along T4, T5, T8.  PET scan -multiple areas of osseous metastatic disease.    CT biopsy T-spine -metastatic breast carcinoma, 50% ER, 25% CA, HER2 2+ by IHC, FISH pending.  Ki-67 10%.  Abemaciclib started 10/23.      US abdomen  for elevated LFTs showed cholelithiasis.  Distended gallbladder.  HIDA scan did not show any acute cholecystitis.  S/p lap cholecystectomy .  Abemaciclib dose reduced to 100 mg twice daily then dc 3/25 for elevated LFTs. Started Palbo . Diarrhea better.      SOCIAL HISTORY:    , lives in [place], .  2 sons in [place].  Older is  for spinal cord stimulator implants.  He has a 5-year-old child.  Younger son has a 2-year-old child and .    ROS:     Constitutional: no fever, chills fatigue,night sweats or weight loss  Neurologic: no headache, seizures, diplopia or weakness  Respiratory: no cough, SOB or hemoptysis  Cardiovascular: no chest pain, ankle swelling, RIOS  GI: no nausea, vomiting, diarrhea or BRBPR  Musculoskeletal: T-spine pain  Dermatologic: no alopecia, rash, pruritis  : no hematuria, dysuria or frequency  Psych: no confusion or depression    Heme: no easy bruising or bleeding     ALLERGIES:    Allergies   Allergen Reactions    Levofloxacin HIVES     Levaquin         MEDICATIONS:    Current Outpatient Medications   Medication Sig Dispense Refill    LETROZOLE 2.5 MG Oral Tab TAKE 1 TABLET(2.5 MG) BY MOUTH DAILY 30 tablet 3    ALPRAZolam 0.25 MG Oral Tab Take 1 tablet (0.25 mg total) by mouth 2 (two) times daily as needed. 30 tablet 3    ibuprofen (ADVIL) 200 MG Oral Tab Take 1 tablet (200 mg total) by mouth every 6 (six) hours as needed for Pain.      metoprolol succinate ER 25 MG Oral Tablet 24 Hr Take 1 tablet (25 mg total) by mouth in the morning.      valsartan 160 MG Oral Tab Take 1 tablet (160 mg total) by mouth in the morning.         VITALS:  Height: 157.5 cm (5' 2.01\") (1042)  Weight: 54.1 kg (119 lb 3.2 oz) (1042)  BSA (Calculated - sq m): 1.53 sq meters (1042)  Pulse: 65 (1042)  BP: 140/63 (1042)  Temp: 96.7 °F (35.9 °C) (1042)  Do Not Use - Resp Rate: --  SpO2: 100 % (1042)    PS:  ECO    PHYSICAL EXAM:    General: alert and oriented x 3, not in acute distress.  Accompanied by .  HEENT: CAROL, oropharynx  clear.  Neck: supple.  No JVD /adenopathy.  CVS: S1S2, regular  Rhythm, no murmurs.   Lungs: Clear to auscultation and percussion.  Abdomen: soft, no masses  Extremities:  No edema.  CNS: no focal deficit    Emotional well being: Patient's emotional well being was assessed.  No issues requiring acute psychosocial intervention were identified.     IMAGING:    PET STANDARD BODY SCAN (ONCOLOGY) (CPT=78815)  Result Date: 2025  PROCEDURE:  PET/CT STANDARD BODY SCAN (ONCOLOGY) (CPT=78815)  COMPARISON:  [facility], PET STANDARD BODY SCAN (ONCOLOGY) (CPT=78815), 10/07/2024, 1:55 PM.  INDICATIONS:  R79.89 Abnormal LFTs K52.1 Diarrhea due to drug C79.51 Cancer, metastatic to bone (HCC) R79.89 Elevated LFTs C79.51 Pain from bone metastases (HCC) G89.3 Pain *  TECHNIQUE:  The patient fasted for  at least 6 hours. F-18 FDG was injected IV, and whole-body images from vertex to mid-thigh were obtained with concurrent CT scan for both anatomic localization as well as attenuation correction.  RADIOPHARMACEUTICAL:    10.5 mCi F-18 FDG FASTING GLUCOSE LEVEL:  75 mg/dl INJECTION TIME:         1100 SCAN TIME:              1157  FINDINGS:  Mean SUV, mediastinal blood pool:  1.6  Mean SUV, liver:  2.3   HEAD/NECK:  Physiologic activity in all regions.  CHEST:  Physiologic activity in all regions.  ABDOMEN/PELVIS:  Physiologic activity in all regions.  MUSCULOSKELETAL:   Multifocal FDG avid osseous metastatic disease, though with mixed response to treatment compared to 10/07/2024.  Index lesion of the right 5th costovertebral junction has an SUV max of 7.1 compared to 3.8 previously.  Interval decrease in FDG uptake involving multifocal rib lesions including the lateral right 7th, posterior right 6th, and lateral left 6th ribs.  Index lesion of the lateral left 6th rib has an SUV max of 2.1 compared to 3.0 previously.  Diminishing uptake corresponding to spinous process of T4, SUV max of 2.9 compared to 4.0 previously.  Uptake within the right posterior elements of L5 has an SUV max of 4.7, similar to prior.  Resolution of uptake within multiple sites of the right iliac wing.  Resolution of multifocal vertebral body uptake of the thoracic spine.            CONCLUSION:   In general, there has been interval improvement in FDG-avid osseous metastatic disease compared to 10/07/2024.  The exception is increased focal metabolism corresponding to osseous metastasis of the right 5th costovertebral junction, currently with an SUV max of 7.1 compared to 3.8 previously.   LOCATION:  [facility]    Dictated by (CST): Brooke Calvert MD on 4/04/2025 at 1:52 PM     Finalized by (CST): Brooke Calvert MD on 4/04/2025 at 2:22 PM           LABS:     Recent Results (from the past 24 hours)   Comp Metabolic Panel (14)    Collection Time: 05/02/25  10:33 AM   Result Value Ref Range    Glucose 88 70 - 99 mg/dL    Sodium 141 136 - 145 mmol/L    Potassium 4.1 3.5 - 5.1 mmol/L    Chloride 106 98 - 112 mmol/L    CO2 27.0 21.0 - 32.0 mmol/L    Anion Gap 8 0 - 18 mmol/L    BUN 10 9 - 23 mg/dL    Creatinine 1.01 0.55 - 1.02 mg/dL    Calcium, Total 10.1 8.7 - 10.6 mg/dL    Calculated Osmolality 290 275 - 295 mOsm/kg    eGFR-Cr 60 >=60 mL/min/1.73m2    AST 31 <34 U/L    ALT 23 10 - 49 U/L    Alkaline Phosphatase 111 55 - 142 U/L    Bilirubin, Total 1.0 0.2 - 1.1 mg/dL    Total Protein 7.8 5.7 - 8.2 g/dL    Albumin 5.1 (H) 3.2 - 4.8 g/dL    Globulin  2.7 2.0 - 3.5 g/dL    A/G Ratio 1.9 1.0 - 2.0    Patient Fasting for CMP? Patient not present    CBC With Differential With Platelet    Collection Time: 05/02/25 10:33 AM   Result Value Ref Range    WBC 1.8 (L) 4.0 - 11.0 x10(3) uL    RBC 4.36 3.80 - 5.30 x10(6)uL    HGB 14.4 12.0 - 16.0 g/dL    HCT 41.8 35.0 - 48.0 %    .0 (L) 150.0 - 450.0 10(3)uL    MCV 95.9 80.0 - 100.0 fL    MCH 33.0 26.0 - 34.0 pg    MCHC 34.4 31.0 - 37.0 g/dL    RDW 13.2 %    Neutrophil Absolute Prelim 0.78 (L) 1.50 - 7.70 x10 (3) uL         Lab Results   Component Value Date     36.4 (H) 03/28/2025     34.6 (H) 03/13/2025     38.0 (H) 03/07/2025     38.7 (H) 01/03/2025     46.9 (H) 11/08/2024       ASSESSMENT AND PLAN:     # Metastatic breast carcinoma: Diag 9/23, bone only disease.  Bx proven involvement T spine.  ER/[place] +, Her 2 neg.     On letrozole and abemaciclib since 9/23.  Tumor marker improving.  PET scan showed improvement.  Abemaciclib dose reduced to 100 mg 1/25 due to diarrhea.  Then held 4/25 due to persistently elevated LFTs.  Switched to palbociclib 4/25.  Tolerating much better.  PET scan 4/25 shows significant improvement.  Continue current medication.    # Elevated liver enzymes: Probably due to abemaciclib.  Resolved.    # Bone metastasis: Started zoledronic acid 10/23, denosumab not covered. Changed  to  q 2 months 9/24. Uptodate on dental exams.    # Diarrhea: Resolved.    # Severe back pain: Received RT to C-spine.  MRI of entire spine 7/24 does not show any new areas of disease.  Pain much better.    # Neutropenia: Mild, due to palbociclib.  Continue monthly CBC and G-CSF if needed.      ORDERS PLACED:    Return for Labs, poss G-CSF inj 1 month. labs, MD, zometa infusion 2 months.    Michelle Reinoso M.D.    [facility]    [facility]  [address] [place], [address]    5/2/2025

## 2025-05-02 ENCOUNTER — OFFICE VISIT (OUTPATIENT)
Age: 70
End: 2025-05-02
Attending: INTERNAL MEDICINE
Payer: MEDICARE

## 2025-05-02 VITALS
BODY MASS INDEX: 21.66 KG/M2 | WEIGHT: 119.19 LBS | DIASTOLIC BLOOD PRESSURE: 63 MMHG | HEART RATE: 65 BPM | OXYGEN SATURATION: 100 % | TEMPERATURE: 97 F | HEIGHT: 62.01 IN | RESPIRATION RATE: 16 BRPM | SYSTOLIC BLOOD PRESSURE: 140 MMHG

## 2025-05-02 DIAGNOSIS — C79.51 PAIN FROM BONE METASTASES (HCC): ICD-10-CM

## 2025-05-02 DIAGNOSIS — T45.1X5A CHEMOTHERAPY-INDUCED NEUTROPENIA: Primary | ICD-10-CM

## 2025-05-02 DIAGNOSIS — C79.51 CANCER, METASTATIC TO BONE (HCC): ICD-10-CM

## 2025-05-02 DIAGNOSIS — G89.3 PAIN FROM BONE METASTASES (HCC): ICD-10-CM

## 2025-05-02 DIAGNOSIS — D70.1 CHEMOTHERAPY-INDUCED NEUTROPENIA: Primary | ICD-10-CM

## 2025-05-02 DIAGNOSIS — K52.1 DIARRHEA DUE TO DRUG: ICD-10-CM

## 2025-05-02 DIAGNOSIS — C50.919 CARCINOMA OF BREAST METASTATIC TO MULTIPLE SITES, UNSPECIFIED LATERALITY (HCC): ICD-10-CM

## 2025-05-02 DIAGNOSIS — C50.919 CARCINOMA OF BREAST METASTATIC TO MULTIPLE SITES, UNSPECIFIED LATERALITY (HCC): Primary | ICD-10-CM

## 2025-05-02 DIAGNOSIS — R79.89 ELEVATED LFTS: ICD-10-CM

## 2025-05-02 DIAGNOSIS — R79.89 ABNORMAL LFTS: ICD-10-CM

## 2025-05-02 LAB
ALBUMIN SERPL-MCNC: 5.1 G/DL (ref 3.2–4.8)
ALBUMIN/GLOB SERPL: 1.9 {RATIO} (ref 1–2)
ALP LIVER SERPL-CCNC: 111 U/L (ref 55–142)
ALT SERPL-CCNC: 23 U/L (ref 10–49)
ANION GAP SERPL CALC-SCNC: 8 MMOL/L (ref 0–18)
AST SERPL-CCNC: 31 U/L (ref ?–34)
BASOPHILS # BLD AUTO: 0.03 X10(3) UL (ref 0–0.2)
BASOPHILS NFR BLD AUTO: 1.6 %
BILIRUB SERPL-MCNC: 1 MG/DL (ref 0.2–1.1)
BRCA1 C.185DELAG BLD/T QL: 61.2 U/ML (ref ?–38)
BUN BLD-MCNC: 10 MG/DL (ref 9–23)
CALCIUM BLD-MCNC: 10.1 MG/DL (ref 8.7–10.6)
CANCER AG15-3 SERPL-ACNC: 30.2 U/ML (ref ?–32.4)
CHLORIDE SERPL-SCNC: 106 MMOL/L (ref 98–112)
CO2 SERPL-SCNC: 27 MMOL/L (ref 21–32)
CREAT BLD-MCNC: 1.01 MG/DL (ref 0.55–1.02)
EGFRCR SERPLBLD CKD-EPI 2021: 60 ML/MIN/1.73M2 (ref 60–?)
EOSINOPHIL # BLD AUTO: 0.03 X10(3) UL (ref 0–0.7)
EOSINOPHIL NFR BLD AUTO: 1.6 %
ERYTHROCYTE [DISTWIDTH] IN BLOOD BY AUTOMATED COUNT: 13.2 %
GLOBULIN PLAS-MCNC: 2.7 G/DL (ref 2–3.5)
GLUCOSE BLD-MCNC: 88 MG/DL (ref 70–99)
HCT VFR BLD AUTO: 41.8 % (ref 35–48)
HGB BLD-MCNC: 14.4 G/DL (ref 12–16)
IMM GRANULOCYTES # BLD AUTO: 0 X10(3) UL (ref 0–1)
IMM GRANULOCYTES NFR BLD: 0 %
LYMPHOCYTES # BLD AUTO: 0.85 X10(3) UL (ref 1–4)
LYMPHOCYTES NFR BLD AUTO: 46.7 %
MCH RBC QN AUTO: 33 PG (ref 26–34)
MCHC RBC AUTO-ENTMCNC: 34.4 G/DL (ref 31–37)
MCV RBC AUTO: 95.9 FL (ref 80–100)
MONOCYTES # BLD AUTO: 0.13 X10(3) UL (ref 0.1–1)
MONOCYTES NFR BLD AUTO: 7.1 %
NEUTROPHILS # BLD AUTO: 0.78 X10 (3) UL (ref 1.5–7.7)
NEUTROPHILS # BLD AUTO: 0.78 X10(3) UL (ref 1.5–7.7)
NEUTROPHILS NFR BLD AUTO: 43 %
OSMOLALITY SERPL CALC.SUM OF ELEC: 290 MOSM/KG (ref 275–295)
PLATELET # BLD AUTO: 105 10(3)UL (ref 150–450)
POTASSIUM SERPL-SCNC: 4.1 MMOL/L (ref 3.5–5.1)
PROT SERPL-MCNC: 7.8 G/DL (ref 5.7–8.2)
RBC # BLD AUTO: 4.36 X10(6)UL (ref 3.8–5.3)
SODIUM SERPL-SCNC: 141 MMOL/L (ref 136–145)
WBC # BLD AUTO: 1.8 X10(3) UL (ref 4–11)

## 2025-05-02 NOTE — PROGRESS NOTES
Education Record    Learner:  Patient/ spouse    Disease / Diagnosis:  Met breast cancer     Barriers / Limitations:  None   Comments:    Method:  Discussion   Comments:    General Topics:  Plan of care reviewed   Comments:    Outcome:  Shows understanding   Comments:   Finishing cycle 1 ibrance 100mg   Pt feeling well. No diarrhea ( has small episode today)   Will have queezy stomach at times.   Energy is good.   Less hair loss.

## 2025-05-02 NOTE — PROGRESS NOTES
[facility] Note:  Renal Dose Adjustment for Zometa (Zoledronic acid)    Marija Elaine is a 70 year old female has been prescribed Zometa (Zoledronic acid)  4 mg IVPB once.    LABS:   Lab Results   Component Value Date/Time    CREATSERUM 1.01 05/02/2025 10:33 AM    BUN 10 05/02/2025 10:33 AM         CrCl: Estimated Creatinine Clearance: 40.4 mL/min (based on SCr of 1.01 mg/dL).    Calculated creatinine clearance is 40 to 49 mL/minute therefore, the dose of Zometa (Zoledronic acid) has been changed to 3.3 mg IVPB once per P&T approved protocol.     Thank you,  Deborah French [facility]  5/2/2025 11:21 AM

## 2025-05-02 NOTE — PROGRESS NOTES
Pt arrived amb for Zometa inf, lab criteria WNL.  Next appt scheduled for 4wks for PL and poss Nivestym inj.  Next Zometa inf and MD appt scheduled for 7/1.  Pt left amb without c/o.    Education Record    Learner:  Patient    Pt here for: Zometa for bone metastases    Barriers / Limitations:  None    Method:  discussion    General Topics:  Plan of care reviewed    Outcome: Pt tolerated infusion with no c/o.

## 2025-05-03 DIAGNOSIS — C50.912 CARCINOMA OF LEFT BREAST METASTATIC TO MULTIPLE SITES (HCC): ICD-10-CM

## 2025-05-03 DIAGNOSIS — C50.919 CARCINOMA OF BREAST METASTATIC TO MULTIPLE SITES, UNSPECIFIED LATERALITY (HCC): ICD-10-CM

## 2025-05-05 RX ORDER — LETROZOLE 2.5 MG/1
2.5 TABLET, FILM COATED ORAL DAILY
Qty: 30 TABLET | Refills: 3 | Status: SHIPPED | OUTPATIENT
Start: 2025-05-05

## 2025-06-02 ENCOUNTER — OFFICE VISIT (OUTPATIENT)
Age: 70
End: 2025-06-02
Attending: INTERNAL MEDICINE
Payer: MEDICARE

## 2025-06-02 VITALS
DIASTOLIC BLOOD PRESSURE: 75 MMHG | HEART RATE: 60 BPM | RESPIRATION RATE: 16 BRPM | WEIGHT: 119.19 LBS | BODY MASS INDEX: 21.66 KG/M2 | TEMPERATURE: 98 F | HEIGHT: 62.01 IN | SYSTOLIC BLOOD PRESSURE: 157 MMHG | OXYGEN SATURATION: 100 %

## 2025-06-02 DIAGNOSIS — T45.1X5A CHEMOTHERAPY-INDUCED NEUTROPENIA: Primary | ICD-10-CM

## 2025-06-02 DIAGNOSIS — C50.919 CARCINOMA OF BREAST METASTATIC TO MULTIPLE SITES, UNSPECIFIED LATERALITY (HCC): ICD-10-CM

## 2025-06-02 DIAGNOSIS — D70.1 CHEMOTHERAPY-INDUCED NEUTROPENIA: Primary | ICD-10-CM

## 2025-06-02 LAB
ALBUMIN SERPL-MCNC: 4.5 G/DL (ref 3.2–4.8)
ALBUMIN/GLOB SERPL: 2.1 {RATIO} (ref 1–2)
ALP LIVER SERPL-CCNC: 97 U/L (ref 55–142)
ALT SERPL-CCNC: 25 U/L (ref 10–49)
ANION GAP SERPL CALC-SCNC: 10 MMOL/L (ref 0–18)
AST SERPL-CCNC: 28 U/L (ref ?–34)
BASOPHILS # BLD AUTO: 0.04 X10(3) UL (ref 0–0.2)
BASOPHILS NFR BLD AUTO: 2.3 %
BILIRUB SERPL-MCNC: 0.9 MG/DL (ref 0.2–1.1)
BRCA1 C.185DELAG BLD/T QL: 57.1 U/ML (ref ?–38)
BUN BLD-MCNC: 14 MG/DL (ref 9–23)
CALCIUM BLD-MCNC: 9.4 MG/DL (ref 8.7–10.6)
CANCER AG15-3 SERPL-ACNC: 29.2 U/ML (ref ?–32.4)
CHLORIDE SERPL-SCNC: 103 MMOL/L (ref 98–112)
CO2 SERPL-SCNC: 28 MMOL/L (ref 21–32)
CREAT BLD-MCNC: 0.92 MG/DL (ref 0.55–1.02)
EGFRCR SERPLBLD CKD-EPI 2021: 67 ML/MIN/1.73M2 (ref 60–?)
EOSINOPHIL # BLD AUTO: 0.03 X10(3) UL (ref 0–0.7)
EOSINOPHIL NFR BLD AUTO: 1.7 %
ERYTHROCYTE [DISTWIDTH] IN BLOOD BY AUTOMATED COUNT: 15.7 %
GLOBULIN PLAS-MCNC: 2.1 G/DL (ref 2–3.5)
GLUCOSE BLD-MCNC: 85 MG/DL (ref 70–99)
HCT VFR BLD AUTO: 33.5 % (ref 35–48)
HGB BLD-MCNC: 11.9 G/DL (ref 12–16)
IMM GRANULOCYTES # BLD AUTO: 0 X10(3) UL (ref 0–1)
IMM GRANULOCYTES NFR BLD: 0 %
LYMPHOCYTES # BLD AUTO: 0.81 X10(3) UL (ref 1–4)
LYMPHOCYTES NFR BLD AUTO: 45.8 %
MCH RBC QN AUTO: 34.7 PG (ref 26–34)
MCHC RBC AUTO-ENTMCNC: 35.5 G/DL (ref 31–37)
MCV RBC AUTO: 97.7 FL (ref 80–100)
MONOCYTES # BLD AUTO: 0.2 X10(3) UL (ref 0.1–1)
MONOCYTES NFR BLD AUTO: 11.3 %
NEUTROPHILS # BLD AUTO: 0.69 X10 (3) UL (ref 1.5–7.7)
NEUTROPHILS # BLD AUTO: 0.69 X10(3) UL (ref 1.5–7.7)
NEUTROPHILS NFR BLD AUTO: 38.9 %
OSMOLALITY SERPL CALC.SUM OF ELEC: 292 MOSM/KG (ref 275–295)
PLATELET # BLD AUTO: 109 10(3)UL (ref 150–450)
POTASSIUM SERPL-SCNC: 4 MMOL/L (ref 3.5–5.1)
PROT SERPL-MCNC: 6.6 G/DL (ref 5.7–8.2)
RBC # BLD AUTO: 3.43 X10(6)UL (ref 3.8–5.3)
SODIUM SERPL-SCNC: 141 MMOL/L (ref 136–145)
WBC # BLD AUTO: 1.8 X10(3) UL (ref 4–11)

## 2025-06-02 NOTE — PROGRESS NOTES
Pt arrived amb for lab draw and poss Nivestym.  Labs drawn per MD order.  Pt met ordered criteria for Nivestym, given per MD order.  Pt bennett well.  Left amb with future appointments scheduled.    Education Record    Learner:  Patient    Pt here for: Nivestym for tx of Breast CA tx side effects    Barriers / Limitations:  None    Method:  discussion    General Topics:  Plan of care reviewed    Outcome: Pt tolerated injection with no c/o.

## 2025-06-27 NOTE — PROGRESS NOTES
Cancer Center Progress Note      Patient Name:  Marija Elaine  YOB: 1955  Medical Record Number:  [id number]    Date of visit: 2025    CHIEF COMPLAINT: Metastatic breast carcinoma.    ONCOLOGY HISTORY:    Bone metastasis .  Biopsy T-spine -metastatic breast carcinoma.  Letrozole -  Abemaciclib 10/23-3/25  RT C 6 10/23-  Palbo -    HPI:     70 year old female that I have followed since  after w/u of back pain and bone scan -multiple skeletal mets.  MRI C-spine -metastatic disease at C6 with epidural tumor.  T spine-Mild epidural extension of tumor along T4, T5, T8. PET-bone only disease. T spine bx-metastatic breast carcinoma, 50% ER, 25% TX, HER2 2+ by IHC, FISH neg.  Ki-67 10%.     H/o L breast ca  s/p L lumpectomy, RT, tamoxifen x 5 years.    Started letrozole and abemaciclib 10/23.  Abemaciclib switched to palbociclib  due to elevated liver enzymes and diarrhea.    No new complaints but in general continues to have poor appetite.  Worsening cough for the last couple of weeks, especially at night.  No fever.   has had similar symptoms.    SOCIAL HISTORY:    , lives in [place], .  2 sons in [place].  Older is  for spinal cord stimulator implants.  He has a 6-year-old and  child.  Younger son has a 3 ans 1-year-old child.    ROS:     Constitutional: no fever, chills fatigue,night sweats or weight loss  Neurologic: no headache, seizures, diplopia or weakness  Respiratory: no cough, SOB or hemoptysis  Cardiovascular: no chest pain, ankle swelling, RIOS  GI: no nausea, vomiting, diarrhea or BRBPR  Musculoskeletal: T-spine pain  Dermatologic: no alopecia, rash, pruritis  : no hematuria, dysuria or frequency  Psych: no confusion or depression   Heme: no easy bruising or bleeding     ALLERGIES:    Allergies   Allergen Reactions    Levofloxacin HIVES     Levaquin         MEDICATIONS:    Current  Outpatient Medications   Medication Sig Dispense Refill    IBRANCE 100 MG Oral Tab       LETROZOLE 2.5 MG Oral Tab TAKE 1 TABLET(2.5 MG) BY MOUTH DAILY 30 tablet 3    ALPRAZolam 0.25 MG Oral Tab Take 1 tablet (0.25 mg total) by mouth 2 (two) times daily as needed. 30 tablet 3    ibuprofen (ADVIL) 200 MG Oral Tab Take 1 tablet (200 mg total) by mouth every 6 (six) hours as needed for Pain.      metoprolol succinate ER 25 MG Oral Tablet 24 Hr Take 1 tablet (25 mg total) by mouth in the morning.      valsartan 160 MG Oral Tab Take 1 tablet (160 mg total) by mouth in the morning.         VITALS:  Height: 157.5 cm (5' 2.01\") (1057)  Weight: 53.1 kg (117 lb) (1057)  BSA (Calculated - sq m): 1.52 sq meters (1057)  Pulse: 60 (1057)  BP: 161/84 (1057)  Temp: 97.3 °F (36.3 °C) (1057)  Do Not Use - Resp Rate: --  SpO2: 100 % (1057)    PS:  ECO    PHYSICAL EXAM:    General: alert and oriented x 3, not in acute distress.  Accompanied by .  HEENT: CAROL, oropharynx  clear.  Neck: supple.  No JVD /adenopathy.  CVS: S1S2, regular  Rhythm, no murmurs.   Lungs: Clear to auscultation and percussion.  Abdomen: soft, no masses  Extremities:  No edema.  CNS: no focal deficit    Emotional well being: Patient's emotional well being was assessed.  No issues requiring acute psychosocial intervention were identified.     IMAGING:    XR CHEST PA + LAT CHEST (KUK=68084)  Result Date: 2025  PROCEDURE: XR CHEST PA + LAT CHEST (CPT=71046) INDICATIONS: DX-R05.1 Acute cough;DX-C50.919 Carcinoma of breast metastatic to multiple sites, unspecified laterality (HCC); PATIENT STATED HISTORY: patient states she has had a cough for 2 weeks mostly worse at night. COMPARISON:     CONCLUSION: Normal cardiac and mediastinal contours. No pulm edema or focal airspace consolidation. Few subtle nodular foci noted within the mid lung bilaterally may represent metastatic disease. Left axillary lymph node  dissection. Sclerotic changes of the spine and ribs may represent active or treated metastatic disease. Electronically Verified and Signed by Attending Radiologist: Brooke Calvert MD 7/1/2025 12:46 PM Workstation: [id number]      LABS:     Recent Results (from the past 24 hours)   Comp Metabolic Panel (14)    Collection Time: 07/01/25 10:51 AM   Result Value Ref Range    Glucose 106 (H) 70 - 99 mg/dL    Sodium 138 136 - 145 mmol/L    Potassium 4.0 3.5 - 5.1 mmol/L    Chloride 102 98 - 112 mmol/L    CO2 29.0 21.0 - 32.0 mmol/L    Anion Gap 7 0 - 18 mmol/L    BUN 15 9 - 23 mg/dL    Creatinine 0.90 0.55 - 1.02 mg/dL    Calcium, Total 10.0 8.7 - 10.6 mg/dL    Calculated Osmolality 287 275 - 295 mOsm/kg    eGFR-Cr 68 >=60 mL/min/1.73m2    AST 30 <34 U/L    ALT 33 10 - 49 U/L    Alkaline Phosphatase 167 (H) 55 - 142 U/L    Bilirubin, Total 0.8 0.2 - 1.1 mg/dL    Total Protein 7.1 5.7 - 8.2 g/dL    Albumin 4.7 3.2 - 4.8 g/dL    Globulin  2.4 2.0 - 3.5 g/dL    A/G Ratio 2.0 1.0 - 2.0    Patient Fasting for CMP? Patient not present    CBC With Differential With Platelet    Collection Time: 07/01/25 10:51 AM   Result Value Ref Range    WBC 3.0 (L) 4.0 - 11.0 x10(3) uL    RBC 3.27 (L) 3.80 - 5.30 x10(6)uL    HGB 12.1 12.0 - 16.0 g/dL    HCT 34.2 (L) 35.0 - 48.0 %    .0 (L) 150.0 - 450.0 10(3)uL    .6 (H) 80.0 - 100.0 fL    MCH 37.0 (H) 26.0 - 34.0 pg    MCHC 35.4 31.0 - 37.0 g/dL    RDW 16.4 %    Neutrophil Absolute Prelim 1.69 1.50 - 7.70 x10 (3) uL    Neutrophil Absolute 1.69 1.50 - 7.70 x10(3) uL    Lymphocyte Absolute 0.94 (L) 1.00 - 4.00 x10(3) uL    Monocyte Absolute 0.25 0.10 - 1.00 x10(3) uL    Eosinophil Absolute 0.04 0.00 - 0.70 x10(3) uL    Basophil Absolute 0.05 0.00 - 0.20 x10(3) uL    Immature Granulocyte Absolute 0.01 0.00 - 1.00 x10(3) uL    Neutrophil % 56.8 %    Lymphocyte % 31.5 %    Monocyte % 8.4 %    Eosinophil % 1.3 %    Basophil % 1.7 %    Immature Granulocyte % 0.3 %       Lab Results    Component Value Date     29.2 06/02/2025     30.2 05/02/2025     36.4 (H) 03/28/2025     34.6 (H) 03/13/2025     38.0 (H) 03/07/2025       ASSESSMENT AND PLAN:     # Metastatic breast carcinoma: Diag 9/23, bone only disease.  Bx proven involvement T spine.  ER/[place] +, Her 2 low.     Started letrozole/abemaciclib 9/23.  Responded.  Abemaciclib dose reduced due to diarrhea and finally stopped due to elevated liver enzymes.  Switched to palbociclib 4/25.  PET 4/25-response.  Tumor markers better and stable.  Continue current management.  Restage 6 months.    PIK3CA mutation on guardant 9/24.  On progression, options include fulvestrant/Capivasertib or fulvestrant/palbociclib/inavolisib.    # Cough: For 2 weeks.  Concerning for palbociclib induced pneumonitis.  Chest x-ray showed some subtle changes today.  She will let me know if symptoms get worse.  May need chest CT.    # Elevated liver enzymes: Probably due to abemaciclib.  Resolved.    # Bone metastasis: Started zoledronic acid 10/23, denosumab not covered. Changed to  q 2 months 9/24. Plan q 4 months from 9/25. Uptodate on dental exams.    # Diarrhea: Resolved.    # Severe back pain: Received RT to C-spine.  MRI of entire spine 7/24 -no new mets. Pain much better.    # Neutropenia: Mild, due to palbociclib.  Continue monthly CBC and G-CSF if needed.    # Quality measures: Hypertension target range 130-140/70-80, recommend she discuss elevated BP with PCP.    ORDERS PLACED:          Procedures    XR CHEST PA + LAT CHEST (AZT=08806)       Return for Labs and poss G-CSF inj 1 month. MD Shar, zometa infusion 2 months.    Michelle Reinoso M.D.    [facility]    [facility]  [address] [place], [address]    7/1/2025

## 2025-07-01 ENCOUNTER — HOSPITAL ENCOUNTER (OUTPATIENT)
Dept: GENERAL RADIOLOGY | Facility: HOSPITAL | Age: 70
Discharge: HOME | End: 2025-07-01
Attending: INTERNAL MEDICINE
Payer: MEDICARE

## 2025-07-01 ENCOUNTER — OFFICE VISIT (OUTPATIENT)
Age: 70
End: 2025-07-01
Attending: INTERNAL MEDICINE
Payer: MEDICARE

## 2025-07-01 ENCOUNTER — NURSE ONLY (OUTPATIENT)
Age: 70
End: 2025-07-01
Attending: INTERNAL MEDICINE
Payer: MEDICARE

## 2025-07-01 VITALS
HEIGHT: 62.01 IN | SYSTOLIC BLOOD PRESSURE: 161 MMHG | BODY MASS INDEX: 21.26 KG/M2 | HEART RATE: 60 BPM | OXYGEN SATURATION: 100 % | DIASTOLIC BLOOD PRESSURE: 84 MMHG | RESPIRATION RATE: 16 BRPM | TEMPERATURE: 97 F | WEIGHT: 117 LBS

## 2025-07-01 DIAGNOSIS — T45.1X5A CHEMOTHERAPY-INDUCED NEUTROPENIA: ICD-10-CM

## 2025-07-01 DIAGNOSIS — G89.3 PAIN FROM BONE METASTASES (HCC): ICD-10-CM

## 2025-07-01 DIAGNOSIS — C79.51 CANCER, METASTATIC TO BONE (HCC): ICD-10-CM

## 2025-07-01 DIAGNOSIS — C50.919 CARCINOMA OF BREAST METASTATIC TO MULTIPLE SITES, UNSPECIFIED LATERALITY (HCC): Primary | ICD-10-CM

## 2025-07-01 DIAGNOSIS — D70.1 CHEMOTHERAPY-INDUCED NEUTROPENIA: ICD-10-CM

## 2025-07-01 DIAGNOSIS — C50.919 CARCINOMA OF BREAST METASTATIC TO MULTIPLE SITES, UNSPECIFIED LATERALITY (HCC): ICD-10-CM

## 2025-07-01 DIAGNOSIS — C79.51 PAIN FROM BONE METASTASES (HCC): ICD-10-CM

## 2025-07-01 DIAGNOSIS — R05.1 ACUTE COUGH: ICD-10-CM

## 2025-07-01 LAB
ALBUMIN SERPL-MCNC: 4.7 G/DL (ref 3.2–4.8)
ALBUMIN/GLOB SERPL: 2 {RATIO} (ref 1–2)
ALP LIVER SERPL-CCNC: 167 U/L (ref 55–142)
ALT SERPL-CCNC: 33 U/L (ref 10–49)
ANION GAP SERPL CALC-SCNC: 7 MMOL/L (ref 0–18)
AST SERPL-CCNC: 30 U/L (ref ?–34)
BASOPHILS # BLD AUTO: 0.05 X10(3) UL (ref 0–0.2)
BASOPHILS NFR BLD AUTO: 1.7 %
BILIRUB SERPL-MCNC: 0.8 MG/DL (ref 0.2–1.1)
BRCA1 C.185DELAG BLD/T QL: 61.5 U/ML (ref ?–38)
BUN BLD-MCNC: 15 MG/DL (ref 9–23)
CALCIUM BLD-MCNC: 10 MG/DL (ref 8.7–10.6)
CANCER AG15-3 SERPL-ACNC: 29.5 U/ML (ref ?–32.4)
CHLORIDE SERPL-SCNC: 102 MMOL/L (ref 98–112)
CO2 SERPL-SCNC: 29 MMOL/L (ref 21–32)
CREAT BLD-MCNC: 0.9 MG/DL (ref 0.55–1.02)
EGFRCR SERPLBLD CKD-EPI 2021: 68 ML/MIN/1.73M2 (ref 60–?)
EOSINOPHIL # BLD AUTO: 0.04 X10(3) UL (ref 0–0.7)
EOSINOPHIL NFR BLD AUTO: 1.3 %
ERYTHROCYTE [DISTWIDTH] IN BLOOD BY AUTOMATED COUNT: 16.4 %
GLOBULIN PLAS-MCNC: 2.4 G/DL (ref 2–3.5)
GLUCOSE BLD-MCNC: 106 MG/DL (ref 70–99)
HCT VFR BLD AUTO: 34.2 % (ref 35–48)
HGB BLD-MCNC: 12.1 G/DL (ref 12–16)
IMM GRANULOCYTES # BLD AUTO: 0.01 X10(3) UL (ref 0–1)
IMM GRANULOCYTES NFR BLD: 0.3 %
LYMPHOCYTES # BLD AUTO: 0.94 X10(3) UL (ref 1–4)
LYMPHOCYTES NFR BLD AUTO: 31.5 %
MCH RBC QN AUTO: 37 PG (ref 26–34)
MCHC RBC AUTO-ENTMCNC: 35.4 G/DL (ref 31–37)
MCV RBC AUTO: 104.6 FL (ref 80–100)
MONOCYTES # BLD AUTO: 0.25 X10(3) UL (ref 0.1–1)
MONOCYTES NFR BLD AUTO: 8.4 %
NEUTROPHILS # BLD AUTO: 1.69 X10 (3) UL (ref 1.5–7.7)
NEUTROPHILS # BLD AUTO: 1.69 X10(3) UL (ref 1.5–7.7)
NEUTROPHILS NFR BLD AUTO: 56.8 %
OSMOLALITY SERPL CALC.SUM OF ELEC: 287 MOSM/KG (ref 275–295)
PLATELET # BLD AUTO: 144 10(3)UL (ref 150–450)
POTASSIUM SERPL-SCNC: 4 MMOL/L (ref 3.5–5.1)
PROT SERPL-MCNC: 7.1 G/DL (ref 5.7–8.2)
RBC # BLD AUTO: 3.27 X10(6)UL (ref 3.8–5.3)
SODIUM SERPL-SCNC: 138 MMOL/L (ref 136–145)
WBC # BLD AUTO: 3 X10(3) UL (ref 4–11)

## 2025-07-01 PROCEDURE — 71046 X-RAY EXAM CHEST 2 VIEWS: CPT | Performed by: INTERNAL MEDICINE

## 2025-07-01 RX ORDER — PALBOCICLIB 100 MG/1
TABLET, FILM COATED ORAL
COMMUNITY
Start: 2025-06-25

## 2025-07-01 NOTE — PROGRESS NOTES
[facility] Note:  Renal Dose Adjustment for Zometa (Zoledronic acid)    Marija Elaine is a 70 year old female has been prescribed Zometa (Zoledronic acid)  4 mg IVPB once.    LABS:   Lab Results   Component Value Date/Time    CREATSERUM 0.90 07/01/2025 10:51 AM    BUN 15 07/01/2025 10:51 AM         CrCl: Estimated Creatinine Clearance: 45.3 mL/min (based on SCr of 0.9 mg/dL).    Calculated creatinine clearance is 40 to 49 mL/minute therefore, the dose of Zometa (Zoledronic acid) has been changed to 3.3 mg IVPB once per P&T approved protocol.     Thank you,  Deborah French [facility]  7/1/2025 12:44 PM

## 2025-07-01 NOTE — PROGRESS NOTES
Education Record    Learner:  Patient    Pt here for: breast ca with bone mets    Barriers / Limitations:  None    Method:  Brief focused, printed material and  reinforcement    General Topics:  Plan of care reviewed    Outcome: Pt tolerated infusion with no c/o.     Here for labs, MD, and zometa. CXR before infusion, okay to proceed per MD. Appts made for labs and injection in one month and labs, MD, and infusion in two months.

## 2025-07-01 NOTE — PROGRESS NOTES
Education Record    Learner:  Patient/.spouse    Disease / Diagnosis:met breast ca  OTV Ibrance/ letrozole,   Zometa     Barriers / Limitations:  None   Comments:    Method:  Discussion   Comments:    General Topics:  Plan of care reviewed   Comments:    Outcome:  Shows understanding   Comments:   Pt reports low appetite. 2# down Denies GI symptoms.   Has been having a cough, bothers her mostly at night, productive, this week feels a little off, needed to rest after walking the dogs.   Playing golf 2x a week, walking dogs daily and works out with .   Dental cleaning up to date.

## 2025-08-01 ENCOUNTER — OFFICE VISIT (OUTPATIENT)
Facility: LOCATION | Age: 70
End: 2025-08-01
Attending: INTERNAL MEDICINE

## 2025-08-01 VITALS
HEIGHT: 62.01 IN | HEART RATE: 55 BPM | RESPIRATION RATE: 16 BRPM | DIASTOLIC BLOOD PRESSURE: 67 MMHG | OXYGEN SATURATION: 100 % | BODY MASS INDEX: 21.62 KG/M2 | WEIGHT: 119 LBS | TEMPERATURE: 97 F | SYSTOLIC BLOOD PRESSURE: 156 MMHG

## 2025-08-01 DIAGNOSIS — T45.1X5A CHEMOTHERAPY-INDUCED NEUTROPENIA: Primary | ICD-10-CM

## 2025-08-01 DIAGNOSIS — C50.919 CARCINOMA OF BREAST METASTATIC TO MULTIPLE SITES, UNSPECIFIED LATERALITY (HCC): ICD-10-CM

## 2025-08-01 DIAGNOSIS — D70.1 CHEMOTHERAPY-INDUCED NEUTROPENIA: Primary | ICD-10-CM

## 2025-08-01 LAB
ALBUMIN SERPL-MCNC: 4.6 G/DL (ref 3.2–4.8)
ALBUMIN/GLOB SERPL: 1.8 (ref 1–2)
ALP LIVER SERPL-CCNC: 106 U/L (ref 55–142)
ALT SERPL-CCNC: 24 U/L (ref 10–49)
ANION GAP SERPL CALC-SCNC: 7 MMOL/L (ref 0–18)
AST SERPL-CCNC: 30 U/L (ref ?–34)
BASOPHILS # BLD AUTO: 0.06 X10(3) UL (ref 0–0.2)
BASOPHILS NFR BLD AUTO: 2.6 %
BILIRUB SERPL-MCNC: 0.7 MG/DL (ref 0.2–1.1)
BRCA1 C.185DELAG BLD/T QL: 68.6 U/ML (ref ?–38)
BUN BLD-MCNC: 12 MG/DL (ref 9–23)
CALCIUM BLD-MCNC: 9.4 MG/DL (ref 8.7–10.6)
CANCER AG15-3 SERPL-ACNC: 29 U/ML (ref ?–32.4)
CHLORIDE SERPL-SCNC: 105 MMOL/L (ref 98–112)
CO2 SERPL-SCNC: 30 MMOL/L (ref 21–32)
CREAT BLD-MCNC: 0.88 MG/DL (ref 0.55–1.02)
EGFRCR SERPLBLD CKD-EPI 2021: 70 ML/MIN/1.73M2 (ref 60–?)
EOSINOPHIL # BLD AUTO: 0.02 X10(3) UL (ref 0–0.7)
EOSINOPHIL NFR BLD AUTO: 0.9 %
ERYTHROCYTE [DISTWIDTH] IN BLOOD BY AUTOMATED COUNT: 14.1 %
GLOBULIN PLAS-MCNC: 2.5 G/DL (ref 2–3.5)
GLUCOSE BLD-MCNC: 99 MG/DL (ref 70–99)
HCT VFR BLD AUTO: 32.9 % (ref 35–48)
HGB BLD-MCNC: 11.8 G/DL (ref 12–16)
IMM GRANULOCYTES # BLD AUTO: 0.01 X10(3) UL (ref 0–1)
IMM GRANULOCYTES NFR BLD: 0.4 %
LYMPHOCYTES # BLD AUTO: 0.8 X10(3) UL (ref 1–4)
LYMPHOCYTES NFR BLD AUTO: 34.5 %
MCH RBC QN AUTO: 37.7 PG (ref 26–34)
MCHC RBC AUTO-ENTMCNC: 35.9 G/DL (ref 31–37)
MCV RBC AUTO: 105.1 FL (ref 80–100)
MONOCYTES # BLD AUTO: 0.32 X10(3) UL (ref 0.1–1)
MONOCYTES NFR BLD AUTO: 13.8 %
NEUTROPHILS # BLD AUTO: 1.11 X10 (3) UL (ref 1.5–7.7)
NEUTROPHILS # BLD AUTO: 1.11 X10(3) UL (ref 1.5–7.7)
NEUTROPHILS NFR BLD AUTO: 47.8 %
OSMOLALITY SERPL CALC.SUM OF ELEC: 294 MOSM/KG (ref 275–295)
PLATELET # BLD AUTO: 137 10(3)UL (ref 150–450)
POTASSIUM SERPL-SCNC: 4.1 MMOL/L (ref 3.5–5.1)
PROT SERPL-MCNC: 7.1 G/DL (ref 5.7–8.2)
RBC # BLD AUTO: 3.13 X10(6)UL (ref 3.8–5.3)
SODIUM SERPL-SCNC: 142 MMOL/L (ref 136–145)
WBC # BLD AUTO: 2.3 X10(3) UL (ref 4–11)

## 2025-08-18 DIAGNOSIS — C50.912 CARCINOMA OF LEFT BREAST METASTATIC TO MULTIPLE SITES (HCC): ICD-10-CM

## 2025-08-18 DIAGNOSIS — C50.919 CARCINOMA OF BREAST METASTATIC TO MULTIPLE SITES, UNSPECIFIED LATERALITY (HCC): Primary | ICD-10-CM

## 2025-08-18 RX ORDER — PALBOCICLIB 100 MG/1
100 TABLET, FILM COATED ORAL AS DIRECTED
Qty: 21 TABLET | Refills: 5 | Status: SHIPPED | OUTPATIENT
Start: 2025-08-18

## 2025-08-29 ENCOUNTER — OFFICE VISIT (OUTPATIENT)
Facility: LOCATION | Age: 70
End: 2025-08-29
Attending: INTERNAL MEDICINE

## 2025-08-29 ENCOUNTER — NURSE ONLY (OUTPATIENT)
Facility: LOCATION | Age: 70
End: 2025-08-29
Attending: INTERNAL MEDICINE

## 2025-08-29 VITALS
HEART RATE: 63 BPM | RESPIRATION RATE: 16 BRPM | DIASTOLIC BLOOD PRESSURE: 82 MMHG | HEIGHT: 62.01 IN | TEMPERATURE: 98 F | SYSTOLIC BLOOD PRESSURE: 187 MMHG | BODY MASS INDEX: 21.35 KG/M2 | OXYGEN SATURATION: 100 % | WEIGHT: 117.5 LBS

## 2025-08-29 DIAGNOSIS — C50.919 CARCINOMA OF BREAST METASTATIC TO MULTIPLE SITES, UNSPECIFIED LATERALITY (HCC): ICD-10-CM

## 2025-08-29 DIAGNOSIS — D70.1 CHEMOTHERAPY-INDUCED NEUTROPENIA: ICD-10-CM

## 2025-08-29 DIAGNOSIS — R79.89 ELEVATED LFTS: ICD-10-CM

## 2025-08-29 DIAGNOSIS — T45.1X5A CHEMOTHERAPY-INDUCED NEUTROPENIA: ICD-10-CM

## 2025-08-29 DIAGNOSIS — G89.3 PAIN FROM BONE METASTASES (HCC): ICD-10-CM

## 2025-08-29 DIAGNOSIS — C79.51 PAIN FROM BONE METASTASES (HCC): ICD-10-CM

## 2025-08-29 DIAGNOSIS — C50.919 CARCINOMA OF BREAST METASTATIC TO MULTIPLE SITES, UNSPECIFIED LATERALITY (HCC): Primary | ICD-10-CM

## 2025-08-29 DIAGNOSIS — C50.912 CARCINOMA OF LEFT BREAST METASTATIC TO MULTIPLE SITES (HCC): Primary | ICD-10-CM

## 2025-08-29 DIAGNOSIS — K52.1 DIARRHEA DUE TO DRUG: ICD-10-CM

## 2025-08-29 DIAGNOSIS — C79.51 CANCER, METASTATIC TO BONE (HCC): ICD-10-CM

## 2025-08-29 LAB
ALBUMIN SERPL-MCNC: 4.7 G/DL (ref 3.2–4.8)
ALBUMIN/GLOB SERPL: 2 (ref 1–2)
ALP LIVER SERPL-CCNC: 92 U/L (ref 55–142)
ALT SERPL-CCNC: 21 U/L (ref 10–49)
ANION GAP SERPL CALC-SCNC: 12 MMOL/L (ref 0–18)
AST SERPL-CCNC: 28 U/L (ref ?–34)
BASOPHILS # BLD AUTO: 0.04 X10(3) UL (ref 0–0.2)
BASOPHILS NFR BLD AUTO: 2.1 %
BILIRUB SERPL-MCNC: 0.8 MG/DL (ref 0.2–1.1)
BRCA1 C.185DELAG BLD/T QL: 59.9 U/ML (ref ?–38)
BUN BLD-MCNC: 8 MG/DL (ref 9–23)
CALCIUM BLD-MCNC: 10.2 MG/DL (ref 8.7–10.6)
CANCER AG15-3 SERPL-ACNC: 28.6 U/ML (ref ?–32.4)
CHLORIDE SERPL-SCNC: 105 MMOL/L (ref 98–112)
CO2 SERPL-SCNC: 28 MMOL/L (ref 21–32)
CREAT BLD-MCNC: 0.91 MG/DL (ref 0.55–1.02)
EGFRCR SERPLBLD CKD-EPI 2021: 67 ML/MIN/1.73M2 (ref 60–?)
EOSINOPHIL # BLD AUTO: 0.04 X10(3) UL (ref 0–0.7)
EOSINOPHIL NFR BLD AUTO: 2.1 %
ERYTHROCYTE [DISTWIDTH] IN BLOOD BY AUTOMATED COUNT: 13.2 %
GLOBULIN PLAS-MCNC: 2.4 G/DL (ref 2–3.5)
GLUCOSE BLD-MCNC: 129 MG/DL (ref 70–99)
HCT VFR BLD AUTO: 35.3 % (ref 35–48)
HGB BLD-MCNC: 12.5 G/DL (ref 12–16)
IMM GRANULOCYTES # BLD AUTO: 0.01 X10(3) UL (ref 0–1)
IMM GRANULOCYTES NFR BLD: 0.5 %
LYMPHOCYTES # BLD AUTO: 1.01 X10(3) UL (ref 1–4)
LYMPHOCYTES NFR BLD AUTO: 52.3 %
MCH RBC QN AUTO: 38.3 PG (ref 26–34)
MCHC RBC AUTO-ENTMCNC: 35.4 G/DL (ref 31–37)
MCV RBC AUTO: 108.3 FL (ref 80–100)
MONOCYTES # BLD AUTO: 0.27 X10(3) UL (ref 0.1–1)
MONOCYTES NFR BLD AUTO: 14 %
NEUTROPHILS # BLD AUTO: 0.56 X10 (3) UL (ref 1.5–7.7)
NEUTROPHILS # BLD AUTO: 0.56 X10(3) UL (ref 1.5–7.7)
NEUTROPHILS NFR BLD AUTO: 29 %
OSMOLALITY SERPL CALC.SUM OF ELEC: 300 MOSM/KG (ref 275–295)
PLATELET # BLD AUTO: 132 10(3)UL (ref 150–450)
POTASSIUM SERPL-SCNC: 3.9 MMOL/L (ref 3.5–5.1)
PROT SERPL-MCNC: 7.1 G/DL (ref 5.7–8.2)
RBC # BLD AUTO: 3.26 X10(6)UL (ref 3.8–5.3)
SODIUM SERPL-SCNC: 145 MMOL/L (ref 136–145)
WBC # BLD AUTO: 1.9 X10(3) UL (ref 4–11)

## (undated) DEVICE — SUT MCRYL 4-0 18IN PS-2 ABSRB UD 19MM 3/8 CIR

## (undated) DEVICE — SPATULA CAUTERY PROBE TIP, DISPOSABLE: Brand: RENEW

## (undated) DEVICE — L-HOOK CAUTERY PROBE TIP, DISPOSABLE: Brand: RENEW

## (undated) DEVICE — GLOVE SUR 8 SENSICARE PI PIP CRM PWD F

## (undated) DEVICE — CLIP APPLIER WITH CLIP LOGIC TECHNOLOGY: Brand: ENDO CLIP III

## (undated) DEVICE — SUT PDS II 0 L27IN ABSRB VLT L26MM CT-2

## (undated) DEVICE — SLEEVE COMPR MD KNEE LEN SGL USE KENDALL SCD

## (undated) DEVICE — SOLUTION IRRIG 1000ML 0.9% NACL USP BTL

## (undated) DEVICE — ADHESIVE SKIN TOP FOR WND CLSR DERMBND ADV

## (undated) DEVICE — TROCAR: Brand: KII® SLEEVE

## (undated) DEVICE — COVER,LIGHT,CAMERA,HARD,1/PK,STRL: Brand: MEDLINE

## (undated) DEVICE — GRABBER GRASPER TIP, DISPOSABLE: Brand: RENEW

## (undated) DEVICE — ENDOPATH ULTRA VERESS INSUFFLATION NEEDLES WITH LUER LOCK CONNECTORS: Brand: ENDOPATH

## (undated) DEVICE — POUCH SPECIMEN WIRE 6X3 250ML

## (undated) DEVICE — 40580 - THE PINK PAD - ADVANCED TRENDELENBURG POSITIONING KIT: Brand: 40580 - THE PINK PAD - ADVANCED TRENDELENBURG POSITIONING KIT

## (undated) DEVICE — TRAY CATH 16FR F INCL BARDX IC COMPLT CARE

## (undated) DEVICE — ENDOCUT SCISSOR TIP, DISPOSABLE: Brand: RENEW

## (undated) DEVICE — #11 STERILE BLADE: Brand: POLYMER COATED BLADES

## (undated) DEVICE — Device: Brand: SUTURE PASSOR PRO

## (undated) DEVICE — TROCAR: Brand: KII FIOS FIRST ENTRY

## (undated) DEVICE — LAPAROVUE VISIBILITY SYSTEM LAPAROSCOPIC SOLUTIONS: Brand: LAPAROVUE

## (undated) DEVICE — TRADITIONAL MARYLAND DISSECTOR TIP, DISPOSABLE: Brand: RENEW

## (undated) DEVICE — SYRINGE MED 10ML LL TIP W/O SFTY DISP

## (undated) DEVICE — TROCAR: Brand: KII SHIELDED BLADED ACCESS SYSTEM

## (undated) DEVICE — DALE ABDOMINAL BINDER, 12" WIDE, STRETCHES TO FIT 30"-45", 1 PER BOX.: Brand: DALE ABDOMINAL BINDER

## (undated) DEVICE — COBRA TOOTH GRASPER TIP, DISPOSABLE: Brand: RENEW

## (undated) DEVICE — LAP CHOLE/APPY CDS-LF: Brand: MEDLINE INDUSTRIES, INC.

## (undated) NOTE — LETTER
24    Patient: Marija Elaine  : 3/26/1955 Visit date: 2024    Dear  Cristofer Reinoso MD    Thank you for referring Marija Elaine to my practice.  Please find my assessment and plan below.     Assessment   1. Calculus of gallbladder with acute on chronic cholecystitis without obstruction          Plan     The patient will be scheduled for laparoscopic cholecystectomy with ICG fluorescent imaging.    The lamar-operative care plan was discussed with the patient, who voices understanding.  Activity and lifting recommendations were discussed in length.     The risks, benefits, and alternatives to the procedure were explained to the patient.  The risks explained include, but are not limited to, bleeding, infection, pain wound complications, recurrence, incorrect diagnosis, injury to adjacent organs and structures. We also discussed the possibile need for further therapeutic, diagnostic, or surgical intervention.  The patient voiced understanding, and after all questions were answered to the patient's satisfaction, the patient provided willing and informed consent to proceed.      Sincerely,       Cortez Bocanegra MD   CC:   No Recipients

## (undated) NOTE — LETTER
[address]  [address]  Authorization for Surgical Operation and Procedure     Date:___________                                                                                                         Time:__________  I hereby authorize Surgeon(s):  Cortez Bocanegra MD, my physician and his/her assistants (if applicable), which may include medical students, residents, and/or fellows, to perform the following surgical operation/ procedure and administer such anesthesia as may be determined necessary by my physician:  Operation/Procedure name (s) Procedure(s):  LAPAROSCOPIC CHOLECYSTECTOMY POSSIBLE OPEN WITH CHOLANGIOGRAM on Marija Elaine   2.   I recognize that during the surgical operation/procedure, unforeseen conditions may necessitate additional or different procedures than those listed above.  I, therefore, further authorize and request that the above-named surgeon, assistants, or designees perform such procedures as are, in their judgment, necessary and desirable.    3.   My surgeon/physician has discussed prior to my surgery the potential benefits, risks and side effects of this procedure; the likelihood of achieving goals; and potential problems that might occur during recuperation.  They also discussed reasonable alternatives to the procedure, including risks, benefits, and side effects related to the alternatives and risks related to not receiving this procedure.  I have had all my questions answered and I acknowledge that no guarantee has been made as to the result that may be obtained.    4.   Should the need arise during my operation/procedure, which includes change of level of care prior to discharge, I also consent to the administration of blood and/or blood products.  Further, I understand that despite careful testing and screening of blood or blood products by collecting agencies, I may still be subject to ill effects as a result of receiving a blood  transfusion and/or blood products.  The following are some, but not all, of the potential risks that can occur: fever and allergic reactions, hemolytic reactions, transmission of diseases such as Hepatitis, AIDS and Cytomegalovirus (CMV) and fluid overload.  In the event that I wish to have an autologous transfusion of my own blood, or a directed donor transfusion, I will discuss this with my physician.  Check only if Refusing Blood or Blood Products  I understand refusal of blood or blood products as deemed necessary by my physician may have serious consequences to my condition to include possible death. I hereby assume responsibility for my refusal and release the hospital, its personnel, and my physicians from any responsibility for the consequences of my refusal.          o  Refuse      5.   I authorize the use of any specimen, organs, tissues, body parts or foreign objects that may be removed from my body during the operation/procedure for diagnosis, research or teaching purposes and their subsequent disposal by hospital authorities.  I also authorize the release of specimen test results and/or written reports to my treating physician on the hospital medical staff or other referring or consulting physicians involved in my care, at the discretion of the Pathologist or my treating physician.    6.   I consent to the photographing or videotaping of the operations or procedures to be performed, including appropriate portions of my body for medical, scientific, or educational purposes, provided my identity is not revealed by the pictures or by descriptive texts accompanying them.  If the procedure has been photographed/videotaped, the surgeon will obtain the original picture, image, videotape or CD.  The hospital will not be responsible for storage, release or maintenance of the picture, image, tape or CD.    7.   I consent to the presence of a  or observers in the operating room as deemed  necessary by my physician or their designees.    8.   I recognize that in the event my procedure results in extended X-Ray/fluoroscopy time, I may develop a skin reaction.    9. If I have a Do Not Attempt Resuscitation (DNAR) order in place, that status will be suspended while in the operating room, procedural suite, and during the recovery period unless otherwise explicitly stated by me (or a person authorized to consent on my behalf). The surgeon or my attending physician will determine when the applicable recovery period ends for purposes of reinstating the DNAR order.  10. Patients having a sterilization procedure: I understand that if the procedure is successful the results will be permanent and it will therefore be impossible for me to inseminate, conceive, or bear children.  I also understand that the procedure is intended to result in sterility, although the result has not been guaranteed.   11. I acknowledge that my physician has explained sedation/analgesia administration to me including the risk and benefits I consent to the administration of sedation/analgesia as may be necessary or desirable in the judgment of my physician.    I CERTIFY THAT I HAVE READ AND FULLY UNDERSTAND THE ABOVE CONSENT TO OPERATION and/or OTHER PROCEDURE.    _________________________________________  __________________________________  Signature of Patient     Signature of Responsible Person         ___________________________________         Printed Name of Responsible Person           _________________________________                 Relationship to Patient  _________________________________________  ______________________________  Signature of Witness          Date  Time      Patient Name: Marija DANIELA Elaine     : 3/26/1955                 Printed: November 15, 2024     Medical Record #: [id number]                     Page 1 of 2                                    [address]   [address]    Consent for Anesthesia    IYouly DANIELA Elaine agree to be cared for by an anesthesiologist, who is specially trained to monitor me and give me medicine to put me to sleep or keep me comfortable during my procedure    I understand that my anesthesiologist is not an employee or agent of [facility] or [employer]. He or she works for [employer].    As the patient asking for anesthesia services, I agree to:  Allow the anesthesiologist (anesthesia doctor) to give me medicine and do additional procedures as necessary. Some examples are: Starting or using an “IV” to give me medicine, fluids or blood during my procedure, and having a breathing tube placed to help me breathe when I’m asleep (intubation). In the event that my heart stops working properly, I understand that my anesthesiologist will make every effort to sustain my life, unless otherwise directed by [facility] Do Not Resuscitate documents.  Tell my anesthesia doctor before my procedure:  If I am pregnant.  The last time that I ate or drank.  All of the medicines I take (including prescriptions, herbal supplements, and pills I can buy without a prescription (including street drugs/illegal medications). Failure to inform my anesthesiologist about these medicines may increase my risk of anesthetic complications.  If I am allergic to anything or have had a reaction to anesthesia before.  I understand how the anesthesia medicine will help me (benefits).  I understand that with any type of anesthesia medicine there are risks:  The most common risks are: nausea, vomiting, sore throat, muscle soreness, damage to my eyes, mouth, or teeth (from breathing tube placement).  Rare risks include: remembering what happened during my procedure, allergic reactions to medications, injury to my airway, heart, lungs, vision, nerves, or muscles and in extremely rare instances death.  My doctor has explained to me other choices  available to me for my care (alternatives).  Pregnant Patients (“epidural”):  I understand that the risks of having an epidural (medicine given into my back to help control pain during labor), include itching, low blood pressure, difficulty urinating, headache or slowing of the baby’s heart. Very rare risks include infection, bleeding, seizure, irregular heart rhythms and nerve injury.  Regional Anesthesia (“spinal”, “epidural”, & “nerve blocks”):  I understand that rare but potential complications include headache, bleeding, infection, seizure, irregular heart rhythms, and nerve injury.    I can change my mind about having anesthesia services at any time before I get the medicine.    _____________________________________________________________________________  Patient (or Representative) Signature/Relationship to Patient  Date   Time    _____________________________________________________________________________   Name (if used)    Language/Organization   Time    _____________________________________________________________________________  Anesthesiologist Signature     Date   Time  I have discussed the procedure and information above with the patient (or patient’s representative) and answered their questions. The patient or their representative has agreed to have anesthesia services.    _____________________________________________________________________________  Witness        Date   Time  I have verified that the signature is that of the patient or patient’s representative, and that it was signed before the procedure  Patient Name: Marija Elaine     : 3/26/1955                 Printed: November 15, 2024     Medical Record #: [id number]                     Page 2 of 2

## (undated) NOTE — LETTER
Patient Name: Marija Elaine  -Age / Sex: 3/26/1955-A: 69 y  female   Medical Records: [id number] CSN: [id number]    ABNORMAL VALUES  Surgeon(s):  Cortez Bocanegra MD  Anesthesia Type: General  Date of Surgery: 11/15/2024  Procedure Description: LAPAROSCOPIC CHOLECYSTECTOMY POSSIBLE OPEN WITH INJECTION OF INDOCYANINE GREEN  Primary Surgeon:  Cortez Bocanegra MD  Phone Number: [phone]    PLEASE NOTE THE FOLLOWING ABNORMALITIES:   Hematology  WBC done = 3.4